# Patient Record
Sex: FEMALE | Race: WHITE | NOT HISPANIC OR LATINO | Employment: OTHER | URBAN - METROPOLITAN AREA
[De-identification: names, ages, dates, MRNs, and addresses within clinical notes are randomized per-mention and may not be internally consistent; named-entity substitution may affect disease eponyms.]

---

## 2017-10-15 ENCOUNTER — HOSPITAL ENCOUNTER (EMERGENCY)
Facility: HOSPITAL | Age: 52
Discharge: HOME | End: 2017-10-15
Attending: EMERGENCY MEDICINE
Payer: MEDICAID

## 2017-10-15 VITALS
HEART RATE: 84 BPM | TEMPERATURE: 99 F | HEIGHT: 67 IN | DIASTOLIC BLOOD PRESSURE: 110 MMHG | SYSTOLIC BLOOD PRESSURE: 178 MMHG | BODY MASS INDEX: 34.53 KG/M2 | RESPIRATION RATE: 16 BRPM | WEIGHT: 220 LBS | OXYGEN SATURATION: 95 %

## 2017-10-15 DIAGNOSIS — R10.30 LOWER ABDOMINAL PAIN: ICD-10-CM

## 2017-10-15 DIAGNOSIS — R19.7 VOMITING AND DIARRHEA: Primary | ICD-10-CM

## 2017-10-15 DIAGNOSIS — R11.10 VOMITING AND DIARRHEA: Primary | ICD-10-CM

## 2017-10-15 LAB
ANION GAP SERPL CALC-SCNC: 12 MMOL/L
B-HCG UR QL: NEGATIVE
BASOPHILS # BLD AUTO: 0.1 K/UL
BASOPHILS NFR BLD: 0.9 %
BUN SERPL-MCNC: 19 MG/DL
CALCIUM SERPL-MCNC: 9.2 MG/DL
CHLORIDE SERPL-SCNC: 105 MMOL/L
CO2 SERPL-SCNC: 24 MMOL/L
CREAT SERPL-MCNC: 0.7 MG/DL
CTP QC/QA: YES
DIFFERENTIAL METHOD: ABNORMAL
EOSINOPHIL # BLD AUTO: 0 K/UL
EOSINOPHIL NFR BLD: 0.2 %
ERYTHROCYTE [DISTWIDTH] IN BLOOD BY AUTOMATED COUNT: 12.9 %
EST. GFR  (AFRICAN AMERICAN): >60 ML/MIN/1.73 M^2
EST. GFR  (NON AFRICAN AMERICAN): >60 ML/MIN/1.73 M^2
GLUCOSE SERPL-MCNC: 109 MG/DL
HCT VFR BLD AUTO: 45.7 %
HGB BLD-MCNC: 15.5 G/DL
LYMPHOCYTES # BLD AUTO: 2.3 K/UL
LYMPHOCYTES NFR BLD: 20.1 %
MCH RBC QN AUTO: 31.8 PG
MCHC RBC AUTO-ENTMCNC: 34 G/DL
MCV RBC AUTO: 93 FL
MONOCYTES # BLD AUTO: 0.5 K/UL
MONOCYTES NFR BLD: 4.4 %
NEUTROPHILS # BLD AUTO: 8.4 K/UL
NEUTROPHILS NFR BLD: 74.4 %
PLATELET # BLD AUTO: 221 K/UL
PMV BLD AUTO: 8.3 FL
POTASSIUM SERPL-SCNC: 3.9 MMOL/L
RBC # BLD AUTO: 4.89 M/UL
SODIUM SERPL-SCNC: 141 MMOL/L
WBC # BLD AUTO: 11.3 K/UL

## 2017-10-15 PROCEDURE — 96375 TX/PRO/DX INJ NEW DRUG ADDON: CPT

## 2017-10-15 PROCEDURE — 25000003 PHARM REV CODE 250: Performed by: EMERGENCY MEDICINE

## 2017-10-15 PROCEDURE — 63600175 PHARM REV CODE 636 W HCPCS: Performed by: EMERGENCY MEDICINE

## 2017-10-15 PROCEDURE — 81025 URINE PREGNANCY TEST: CPT | Performed by: EMERGENCY MEDICINE

## 2017-10-15 PROCEDURE — 85025 COMPLETE CBC W/AUTO DIFF WBC: CPT

## 2017-10-15 PROCEDURE — 99284 EMERGENCY DEPT VISIT MOD MDM: CPT | Mod: 25

## 2017-10-15 PROCEDURE — 80048 BASIC METABOLIC PNL TOTAL CA: CPT

## 2017-10-15 PROCEDURE — 36415 COLL VENOUS BLD VENIPUNCTURE: CPT

## 2017-10-15 PROCEDURE — 96365 THER/PROPH/DIAG IV INF INIT: CPT

## 2017-10-15 RX ORDER — KETOROLAC TROMETHAMINE 30 MG/ML
10 INJECTION, SOLUTION INTRAMUSCULAR; INTRAVENOUS
Status: COMPLETED | OUTPATIENT
Start: 2017-10-15 | End: 2017-10-15

## 2017-10-15 RX ORDER — GUAIFENESIN/DEXTROMETHORPHAN 100-10MG/5
5 SYRUP ORAL 4 TIMES DAILY PRN
Qty: 120 ML | Refills: 0 | Status: SHIPPED | OUTPATIENT
Start: 2017-10-15 | End: 2017-10-25

## 2017-10-15 RX ORDER — PROMETHAZINE HYDROCHLORIDE 25 MG/1
25 TABLET ORAL EVERY 6 HOURS PRN
Qty: 12 TABLET | Refills: 0 | Status: SHIPPED | OUTPATIENT
Start: 2017-10-15 | End: 2023-05-16

## 2017-10-15 RX ORDER — LOPERAMIDE HYDROCHLORIDE 2 MG/1
2 CAPSULE ORAL 4 TIMES DAILY PRN
Qty: 12 CAPSULE | Refills: 0 | Status: SHIPPED | OUTPATIENT
Start: 2017-10-15 | End: 2017-10-25

## 2017-10-15 RX ADMIN — KETOROLAC TROMETHAMINE 10 MG: 30 INJECTION, SOLUTION INTRAMUSCULAR at 07:10

## 2017-10-15 RX ADMIN — SODIUM CHLORIDE 1000 ML: 0.9 INJECTION, SOLUTION INTRAVENOUS at 07:10

## 2017-10-15 RX ADMIN — PROMETHAZINE HYDROCHLORIDE 25 MG: 25 INJECTION INTRAMUSCULAR; INTRAVENOUS at 07:10

## 2017-10-15 NOTE — ED PROVIDER NOTES
Encounter Date: 10/15/2017    SCRIBE #1 NOTE: I, Lyndsey Marr, am scribing for, and in the presence of, Dr. Danielle .       History     Chief Complaint   Patient presents with    Emesis     symptoms x3 days    Diarrhea    Generalized Body Aches     with fever       10/15/2017 6:38 PM     Chief complaint: Emesis      Dinora Anderson is a 51 y.o. female with Bipolar Disorder, Schizoaffective disorder, and polysubstance abuse who presents to the ED with complaints of emesis, diarrhea, general body aches and fever x 3 days. She notes a 102 fever yesterday. Pt notes abd pain secondary to emesis. She reports several episodes of emesis and diarrhea, more than 6. She has taken Tylenol with no relief of sx. Pt denies blood in stool and sick contact. Allergens include Codeine and Penicillin.       The history is provided by the patient.     Review of patient's allergies indicates:   Allergen Reactions    Codeine      Rash and itch    Pcn [penicillins]      swelling     Past Medical History:   Diagnosis Date    Bipolar 1 disorder     Bipolar 1 disorder     bipolar c schizophrenia and ptsd    Polysubstance abuse     PTSD (post-traumatic stress disorder)     Schizoaffective disorder      Past Surgical History:   Procedure Laterality Date    TUBAL LIGATION       History reviewed. No pertinent family history.  Social History   Substance Use Topics    Smoking status: Former Smoker    Smokeless tobacco: Former User     Quit date: 8/16/2013    Alcohol use No     Review of Systems   Constitutional: Positive for fever.   HENT: Negative for sore throat.    Eyes: Negative for redness.   Respiratory: Negative for shortness of breath.    Cardiovascular: Negative for chest pain.   Gastrointestinal: Positive for diarrhea, nausea and vomiting.   Genitourinary: Negative for dysuria.   Musculoskeletal: Positive for myalgias. Negative for back pain.   Skin: Negative for rash.   Neurological: Negative for weakness.    Hematological: Does not bruise/bleed easily.       Physical Exam     Initial Vitals [10/15/17 1833]   BP Pulse Resp Temp SpO2   (!) 178/110 84 16 98.7 °F (37.1 °C) 95 %      MAP       132.67         Physical Exam    Nursing note and vitals reviewed.  Constitutional: She appears well-developed and well-nourished. She is not diaphoretic. No distress.   HENT:   Head: Normocephalic and atraumatic.   Mouth/Throat: Oropharynx is clear and moist and mucous membranes are normal.   Eyes: Conjunctivae and EOM are normal. Pupils are equal, round, and reactive to light.   Neck: Normal range of motion. Neck supple.   Cardiovascular: Normal rate, regular rhythm, normal heart sounds and intact distal pulses. Exam reveals no gallop and no friction rub.    No murmur heard.  Pulmonary/Chest: Breath sounds normal. She has no wheezes. She has no rhonchi. She has no rales.   Abdominal: Soft. She exhibits no distension. There is no tenderness.   Musculoskeletal: Normal range of motion.   No peripheral edema.    Neurological: She is alert and oriented to person, place, and time.   Skin: No rash noted. No erythema.   Psychiatric: She has a normal mood and affect. Her speech is normal and behavior is normal. Judgment and thought content normal.         ED Course   Procedures  Labs Reviewed   CBC W/ AUTO DIFFERENTIAL - Abnormal; Notable for the following:        Result Value    MCH 31.8 (*)     MPV 8.3 (*)     Gran # 8.4 (*)     Gran% 74.4 (*)     All other components within normal limits   BASIC METABOLIC PANEL   POCT URINE PREGNANCY                        Scribe Attestation:   Scribe #1: I performed the above scribed service and the documentation accurately describes the services I performed. I attest to the accuracy of the note.    I, Dr. George Danielle, personally performed the services described in this documentation. All medical record entries made by the scribe were at my direction and in my presence.  I have reviewed the chart  and agree that the record reflects my personal performance and is accurate and complete. George Danielle MD.  10:28 PM 10/15/2017    Dinora Anderson is a 51 y.o. female presenting with multiple complaints consisting of vomiting, diarrhea, and abdominal pain.  No sniffed and reproducible abdominal pain on exam with likely pain secondary to repeated vomiting.  Patient does not appear severely dehydrated.  IV fluids for symptomatic treatment along with antiemetic given here with significant symptomatic improvement.  No further emesis in ED.  Laboratories reviewed with no sign of significant renal insult or dehydration.  For low suspicion for bacterial etiology.  I do not think further antibiotics are indicated.  If infectious viral etiology much more likely.  He is appropriate for outpatient therapy with oral rehydration reviewed and antiemetic as needed prescribed.  Probiotic daily also prescribed pending outpatient PCP follow-up.  Detailed return precautions reviewed.  Very low suspicion for other emergent abdominal processes such as appendicitis, cholecystitis, abscess.  I do not think further abdominal imaging is indicated.        ED Course      Clinical Impression:   The primary encounter diagnosis was Vomiting and diarrhea. A diagnosis of Lower abdominal pain was also pertinent to this visit.                           George Danielle MD  10/15/17 3381

## 2017-11-16 ENCOUNTER — HOSPITAL ENCOUNTER (EMERGENCY)
Facility: HOSPITAL | Age: 52
Discharge: HOME | End: 2017-11-16
Attending: EMERGENCY MEDICINE
Payer: MEDICAID

## 2017-11-16 VITALS
DIASTOLIC BLOOD PRESSURE: 61 MMHG | OXYGEN SATURATION: 93 % | HEART RATE: 79 BPM | SYSTOLIC BLOOD PRESSURE: 118 MMHG | HEIGHT: 67 IN | BODY MASS INDEX: 37.67 KG/M2 | TEMPERATURE: 99 F | WEIGHT: 240 LBS | RESPIRATION RATE: 22 BRPM

## 2017-11-16 DIAGNOSIS — K27.9 PEPTIC ULCER DISEASE: Primary | ICD-10-CM

## 2017-11-16 DIAGNOSIS — R10.9 ABDOMINAL PAIN, UNSPECIFIED ABDOMINAL LOCATION: ICD-10-CM

## 2017-11-16 LAB
ALBUMIN SERPL BCP-MCNC: 3.5 G/DL
ALP SERPL-CCNC: 106 U/L
ALT SERPL W/O P-5'-P-CCNC: 22 U/L
ANION GAP SERPL CALC-SCNC: 10 MMOL/L
AST SERPL-CCNC: 23 U/L
BASOPHILS # BLD AUTO: 0 K/UL
BASOPHILS NFR BLD: 0.3 %
BILIRUB SERPL-MCNC: 0.3 MG/DL
BUN SERPL-MCNC: 20 MG/DL
CALCIUM SERPL-MCNC: 9.7 MG/DL
CHLORIDE SERPL-SCNC: 102 MMOL/L
CO2 SERPL-SCNC: 26 MMOL/L
CREAT SERPL-MCNC: 1.2 MG/DL
DIFFERENTIAL METHOD: ABNORMAL
EOSINOPHIL # BLD AUTO: 0 K/UL
EOSINOPHIL NFR BLD: 0.4 %
ERYTHROCYTE [DISTWIDTH] IN BLOOD BY AUTOMATED COUNT: 12.8 %
EST. GFR  (AFRICAN AMERICAN): >60 ML/MIN/1.73 M^2
EST. GFR  (NON AFRICAN AMERICAN): 52 ML/MIN/1.73 M^2
GLUCOSE SERPL-MCNC: 123 MG/DL
HCT VFR BLD AUTO: 44.5 %
HGB BLD-MCNC: 15.1 G/DL
LIPASE SERPL-CCNC: 37 U/L
LYMPHOCYTES # BLD AUTO: 2.1 K/UL
LYMPHOCYTES NFR BLD: 20 %
MCH RBC QN AUTO: 31.9 PG
MCHC RBC AUTO-ENTMCNC: 34 G/DL
MCV RBC AUTO: 94 FL
MONOCYTES # BLD AUTO: 0.7 K/UL
MONOCYTES NFR BLD: 7 %
NEUTROPHILS # BLD AUTO: 7.6 K/UL
NEUTROPHILS NFR BLD: 72.3 %
PLATELET # BLD AUTO: 212 K/UL
PMV BLD AUTO: 8.4 FL
POTASSIUM SERPL-SCNC: 3.9 MMOL/L
PROT SERPL-MCNC: 7.3 G/DL
RBC # BLD AUTO: 4.74 M/UL
SODIUM SERPL-SCNC: 138 MMOL/L
WBC # BLD AUTO: 10.5 K/UL

## 2017-11-16 PROCEDURE — 96375 TX/PRO/DX INJ NEW DRUG ADDON: CPT

## 2017-11-16 PROCEDURE — 83690 ASSAY OF LIPASE: CPT

## 2017-11-16 PROCEDURE — 96374 THER/PROPH/DIAG INJ IV PUSH: CPT

## 2017-11-16 PROCEDURE — 80053 COMPREHEN METABOLIC PANEL: CPT

## 2017-11-16 PROCEDURE — 96361 HYDRATE IV INFUSION ADD-ON: CPT

## 2017-11-16 PROCEDURE — C9113 INJ PANTOPRAZOLE SODIUM, VIA: HCPCS | Performed by: NURSE PRACTITIONER

## 2017-11-16 PROCEDURE — 25000003 PHARM REV CODE 250: Performed by: NURSE PRACTITIONER

## 2017-11-16 PROCEDURE — 36415 COLL VENOUS BLD VENIPUNCTURE: CPT

## 2017-11-16 PROCEDURE — 99284 EMERGENCY DEPT VISIT MOD MDM: CPT | Mod: 25

## 2017-11-16 PROCEDURE — 85025 COMPLETE CBC W/AUTO DIFF WBC: CPT

## 2017-11-16 PROCEDURE — 25500020 PHARM REV CODE 255

## 2017-11-16 PROCEDURE — 63600175 PHARM REV CODE 636 W HCPCS: Performed by: NURSE PRACTITIONER

## 2017-11-16 RX ORDER — DIPHENHYDRAMINE HYDROCHLORIDE 50 MG/ML
25 INJECTION INTRAMUSCULAR; INTRAVENOUS
Status: COMPLETED | OUTPATIENT
Start: 2017-11-16 | End: 2017-11-16

## 2017-11-16 RX ORDER — ONDANSETRON 2 MG/ML
4 INJECTION INTRAMUSCULAR; INTRAVENOUS
Status: COMPLETED | OUTPATIENT
Start: 2017-11-16 | End: 2017-11-16

## 2017-11-16 RX ORDER — HYDROMORPHONE HYDROCHLORIDE 1 MG/ML
0.5 INJECTION, SOLUTION INTRAMUSCULAR; INTRAVENOUS; SUBCUTANEOUS
Status: COMPLETED | OUTPATIENT
Start: 2017-11-16 | End: 2017-11-16

## 2017-11-16 RX ORDER — KETOROLAC TROMETHAMINE 30 MG/ML
15 INJECTION, SOLUTION INTRAMUSCULAR; INTRAVENOUS
Status: COMPLETED | OUTPATIENT
Start: 2017-11-16 | End: 2017-11-16

## 2017-11-16 RX ORDER — PANTOPRAZOLE SODIUM 40 MG/10ML
40 INJECTION, POWDER, LYOPHILIZED, FOR SOLUTION INTRAVENOUS
Status: COMPLETED | OUTPATIENT
Start: 2017-11-16 | End: 2017-11-16

## 2017-11-16 RX ORDER — PROCHLORPERAZINE EDISYLATE 5 MG/ML
5 INJECTION INTRAMUSCULAR; INTRAVENOUS
Status: COMPLETED | OUTPATIENT
Start: 2017-11-16 | End: 2017-11-16

## 2017-11-16 RX ORDER — MORPHINE SULFATE 4 MG/ML
8 INJECTION, SOLUTION INTRAMUSCULAR; INTRAVENOUS
Status: DISCONTINUED | OUTPATIENT
Start: 2017-11-16 | End: 2017-11-16

## 2017-11-16 RX ORDER — OMEPRAZOLE 40 MG/1
40 CAPSULE, DELAYED RELEASE ORAL DAILY
Qty: 30 CAPSULE | Refills: 1 | Status: SHIPPED | OUTPATIENT
Start: 2017-11-16 | End: 2018-11-16

## 2017-11-16 RX ADMIN — ONDANSETRON 4 MG: 2 INJECTION INTRAMUSCULAR; INTRAVENOUS at 07:11

## 2017-11-16 RX ADMIN — LIDOCAINE HYDROCHLORIDE: 20 SOLUTION ORAL; TOPICAL at 09:11

## 2017-11-16 RX ADMIN — PROCHLORPERAZINE EDISYLATE 5 MG: 5 INJECTION INTRAMUSCULAR; INTRAVENOUS at 09:11

## 2017-11-16 RX ADMIN — SODIUM CHLORIDE 1000 ML: 0.9 INJECTION, SOLUTION INTRAVENOUS at 07:11

## 2017-11-16 RX ADMIN — HYDROMORPHONE HYDROCHLORIDE 0.5 MG: 1 INJECTION, SOLUTION INTRAMUSCULAR; INTRAVENOUS; SUBCUTANEOUS at 08:11

## 2017-11-16 RX ADMIN — IOHEXOL 100 ML: 350 INJECTION, SOLUTION INTRAVENOUS at 08:11

## 2017-11-16 RX ADMIN — DIPHENHYDRAMINE HYDROCHLORIDE 25 MG: 50 INJECTION, SOLUTION INTRAMUSCULAR; INTRAVENOUS at 09:11

## 2017-11-16 RX ADMIN — PANTOPRAZOLE SODIUM 40 MG: 40 INJECTION, POWDER, FOR SOLUTION INTRAVENOUS at 10:11

## 2017-11-16 RX ADMIN — KETOROLAC TROMETHAMINE 15 MG: 30 INJECTION, SOLUTION INTRAMUSCULAR at 07:11

## 2017-11-17 NOTE — ED NOTES
Patient identifiers for Dionra Anderson checked and correct.  LOC:  Patient is awake, alert, and aware of environment with an appropriate affect. Patient is oriented x 3 and speaking appropriately.  APPEARANCE:  Patient resting comfortably and in no acute distress. Patient is clean and well groomed, patient's clothing is properly fastened.  SKIN:  The skin is warm and dry. Patient has normal skin turgor and moist mucus membranes. Skin is intact; no bruising or breakdown noted.  MUSCULOSKELETAL:  Patient is moving all extremities well, no obvious deformities noted. Pulses intact.   RESPIRATORY:  Airway is open and patent. Respirations are spontaneous and non-labored with normal effort and rate.  CARDIAC:  Patient has a normal rate and rhythm. No peripheral edema noted. Capillary refill < 3 seconds.  ABDOMEN:  No distention noted.  Tender to nelson upper and mid. Pt reports nausea and pain worsening after eating x 3 weeks.   NEUROLOGICAL:  PERRL. Facial expression is symmetrical. Hand grasps are equal bilaterally. Normal sensation in all extremities when touched with finger.  Allergies reported:    Review of patient's allergies indicates:   Allergen Reactions    Codeine      Rash and itch    Pcn [penicillins]      swelling     OTHER NOTES:

## 2017-11-17 NOTE — ED PROVIDER NOTES
"Encounter Date: 11/16/2017    SCRIBE #1 NOTE: I, Lyndsey Marr, am scribing for, and in the presence of, Jeancarlos Moore NP.       History     Chief Complaint   Patient presents with    Abdominal Pain     started 3 weeks ago, reports unable to hold anything down        11/16/2017 6:46 PM     Chief complaint: Abd pain      Dinora Anderson is a 51 y.o. female with a hx of Hepatitis C, Bipolar disorder, Schizoaffective disorder, and PTSD and Substance abuse who presents to the ED with complaints of intermittent mid epigastric abd pain and bloating associated with nausea, vomiting, and diarrhea x 3 weeks. The sx are exacerbated with PO intake. She states she "can't keep anything down." Pt was seen by PCP last week and was given Phenergan. The patient states that her PCP was to set up endoscopy and colonoscopy but has not yet scheduled the procedure. Allergies include Codeine and Penicillins. She denies pain radiation, blood in stool and hematemesis, urinary symptoms.       The history is provided by the patient.     Review of patient's allergies indicates:   Allergen Reactions    Codeine      Rash and itch    Pcn [penicillins]      swelling     Past Medical History:   Diagnosis Date    Bipolar 1 disorder     Bipolar 1 disorder     bipolar c schizophrenia and ptsd    Polysubstance abuse     PTSD (post-traumatic stress disorder)     Schizoaffective disorder      Past Surgical History:   Procedure Laterality Date    TUBAL LIGATION       No family history on file.  Social History   Substance Use Topics    Smoking status: Former Smoker    Smokeless tobacco: Former User     Quit date: 8/16/2013    Alcohol use No     Review of Systems   Constitutional: Negative for chills and fever.   HENT: Negative for congestion, rhinorrhea and sore throat.    Eyes: Negative for pain and redness.   Respiratory: Negative for cough and shortness of breath.    Cardiovascular: Negative for chest pain and palpitations. "   Gastrointestinal: Positive for abdominal pain, diarrhea, nausea and vomiting.   Genitourinary: Negative for dysuria, flank pain, frequency, hematuria and urgency.   Musculoskeletal: Negative for gait problem, myalgias and neck pain.   Skin: Negative for rash.   Neurological: Negative for dizziness, light-headedness and headaches.       Physical Exam     Initial Vitals [11/16/17 1825]   BP Pulse Resp Temp SpO2   117/76 106 (!) 22 99.2 °F (37.3 °C) 95 %      MAP       89.67         Physical Exam    Nursing note and vitals reviewed.  Constitutional: Vital signs are normal. She appears well-developed and well-nourished. She is not diaphoretic. She is active. She does not have a sickly appearance. No distress.   HENT:   Head: Normocephalic and atraumatic.   Eyes: Conjunctivae are normal. Pupils are equal, round, and reactive to light.   Neck: Normal range of motion and full passive range of motion without pain.   Cardiovascular: Normal rate, regular rhythm and normal heart sounds. Exam reveals no gallop and no friction rub.    No murmur heard.  Pulmonary/Chest: Breath sounds normal. She has no wheezes. She has no rhonchi. She has no rales.   Abdominal: Soft. Bowel sounds are normal. There is tenderness (diffuse, > in epigastric and RUQ).   Musculoskeletal: Normal range of motion.   Neurological: She is alert. Gait normal.   Skin: Skin is warm and dry. Capillary refill takes less than 2 seconds.   Psychiatric: She has a normal mood and affect.         ED Course   Procedures  Labs Reviewed - No data to display                APC / Resident Notes:   Dinora Anderson is a 51 year old female presenting to the ED with 3 weeks of epigastric and RUQ abdominal pain, nausea, vomiting, and diarrhea with oral intake. The patient appears well hydrated and nontoxic.  No emergent findings noted on CT.  Her pain was resolved after GI cocktail and nausea/vomiting resolved after compazine. I do not suspect SBO, viscus perforation,  pancreatitis, or other emergent cause of her abdominal pain. Her symptoms may be due to a peptic ulcer. I prescribed a PPI and instructed the patient on use of maalox. I advised her that she must contact GI as previously discussed by her PCP to set up follow up for endoscopy/colonoscopy. I discussed specific return precautions and she verbalized understanding. Based on my clinical evaluation, I do not appreciate any immediate, emergent, or life threatening condition or etiology that warrants additional workup today and feel that the patient can be discharged with close follow up care.          Scribe Attestation:   Scribe #1: I performed the above scribed service and the documentation accurately describes the services I performed. I attest to the accuracy of the note.    I, LASHAY Dueñas, personally performed the services described in this documentation. All medical record entries made by the scribe were at my direction and in my presence.  I have reviewed the chart and agree that the record reflects my personal performance and is accurate and complete. LASHAY Dueñas.  1:58 AM 11/17/2017          ED Course      Clinical Impression:     1. Peptic ulcer disease    2. Abdominal pain, unspecified abdominal location        Disposition:   Disposition: Discharged  Condition: Stable                        Denae Moore NP  11/17/17 0204

## 2023-05-16 ENCOUNTER — OFFICE VISIT (OUTPATIENT)
Dept: FAMILY MEDICINE | Facility: CLINIC | Age: 58
End: 2023-05-16
Payer: MEDICAID

## 2023-05-16 VITALS
TEMPERATURE: 98 F | RESPIRATION RATE: 18 BRPM | OXYGEN SATURATION: 98 % | HEART RATE: 89 BPM | DIASTOLIC BLOOD PRESSURE: 94 MMHG | WEIGHT: 257.19 LBS | BODY MASS INDEX: 40.37 KG/M2 | HEIGHT: 67 IN | SYSTOLIC BLOOD PRESSURE: 146 MMHG

## 2023-05-16 DIAGNOSIS — Z11.4 SCREENING FOR HIV (HUMAN IMMUNODEFICIENCY VIRUS): ICD-10-CM

## 2023-05-16 DIAGNOSIS — I10 ESSENTIAL HYPERTENSION: ICD-10-CM

## 2023-05-16 DIAGNOSIS — Z12.31 ENCOUNTER FOR SCREENING MAMMOGRAM FOR MALIGNANT NEOPLASM OF BREAST: ICD-10-CM

## 2023-05-16 DIAGNOSIS — M54.16 LUMBAR RADICULOPATHY, CHRONIC: ICD-10-CM

## 2023-05-16 DIAGNOSIS — Z76.89 ENCOUNTER TO ESTABLISH CARE: Primary | ICD-10-CM

## 2023-05-16 DIAGNOSIS — F20.9 SCHIZOPHRENIA, UNSPECIFIED TYPE: ICD-10-CM

## 2023-05-16 DIAGNOSIS — F31.32 BIPOLAR 1 DISORDER, DEPRESSED, MODERATE: ICD-10-CM

## 2023-05-16 DIAGNOSIS — Z11.59 ENCOUNTER FOR HEPATITIS C SCREENING TEST FOR LOW RISK PATIENT: ICD-10-CM

## 2023-05-16 PROCEDURE — 99204 PR OFFICE/OUTPT VISIT, NEW, LEVL IV, 45-59 MIN: ICD-10-PCS | Mod: S$PBB,,, | Performed by: FAMILY MEDICINE

## 2023-05-16 PROCEDURE — 3008F BODY MASS INDEX DOCD: CPT | Mod: CPTII,,, | Performed by: FAMILY MEDICINE

## 2023-05-16 PROCEDURE — 1160F RVW MEDS BY RX/DR IN RCRD: CPT | Mod: CPTII,,, | Performed by: FAMILY MEDICINE

## 2023-05-16 PROCEDURE — 3077F PR MOST RECENT SYSTOLIC BLOOD PRESSURE >= 140 MM HG: ICD-10-PCS | Mod: CPTII,,, | Performed by: FAMILY MEDICINE

## 2023-05-16 PROCEDURE — 99999 PR PBB SHADOW E&M-NEW PATIENT-LVL III: ICD-10-PCS | Mod: PBBFAC,,, | Performed by: FAMILY MEDICINE

## 2023-05-16 PROCEDURE — 1159F PR MEDICATION LIST DOCUMENTED IN MEDICAL RECORD: ICD-10-PCS | Mod: CPTII,,, | Performed by: FAMILY MEDICINE

## 2023-05-16 PROCEDURE — 99204 OFFICE O/P NEW MOD 45 MIN: CPT | Mod: S$PBB,,, | Performed by: FAMILY MEDICINE

## 2023-05-16 PROCEDURE — 3080F PR MOST RECENT DIASTOLIC BLOOD PRESSURE >= 90 MM HG: ICD-10-PCS | Mod: CPTII,,, | Performed by: FAMILY MEDICINE

## 2023-05-16 PROCEDURE — 4010F ACE/ARB THERAPY RXD/TAKEN: CPT | Mod: CPTII,,, | Performed by: FAMILY MEDICINE

## 2023-05-16 PROCEDURE — 99999 PR PBB SHADOW E&M-NEW PATIENT-LVL III: CPT | Mod: PBBFAC,,, | Performed by: FAMILY MEDICINE

## 2023-05-16 PROCEDURE — 4010F PR ACE/ARB THEARPY RXD/TAKEN: ICD-10-PCS | Mod: CPTII,,, | Performed by: FAMILY MEDICINE

## 2023-05-16 PROCEDURE — 3080F DIAST BP >= 90 MM HG: CPT | Mod: CPTII,,, | Performed by: FAMILY MEDICINE

## 2023-05-16 PROCEDURE — 99203 OFFICE O/P NEW LOW 30 MIN: CPT | Mod: PBBFAC | Performed by: FAMILY MEDICINE

## 2023-05-16 PROCEDURE — 3077F SYST BP >= 140 MM HG: CPT | Mod: CPTII,,, | Performed by: FAMILY MEDICINE

## 2023-05-16 PROCEDURE — 1160F PR REVIEW ALL MEDS BY PRESCRIBER/CLIN PHARMACIST DOCUMENTED: ICD-10-PCS | Mod: CPTII,,, | Performed by: FAMILY MEDICINE

## 2023-05-16 PROCEDURE — 1159F MED LIST DOCD IN RCRD: CPT | Mod: CPTII,,, | Performed by: FAMILY MEDICINE

## 2023-05-16 PROCEDURE — 3008F PR BODY MASS INDEX (BMI) DOCUMENTED: ICD-10-PCS | Mod: CPTII,,, | Performed by: FAMILY MEDICINE

## 2023-05-16 RX ORDER — LOSARTAN POTASSIUM 25 MG/1
25 TABLET ORAL DAILY
Qty: 90 TABLET | Refills: 3 | Status: SHIPPED | OUTPATIENT
Start: 2023-05-16 | End: 2024-05-15

## 2023-05-16 RX ORDER — QUETIAPINE FUMARATE 100 MG/1
100 TABLET, FILM COATED ORAL DAILY
Qty: 90 TABLET | Refills: 3 | Status: SHIPPED | OUTPATIENT
Start: 2023-05-16

## 2023-05-16 RX ORDER — TIZANIDINE 4 MG/1
4 TABLET ORAL EVERY 8 HOURS PRN
Qty: 60 TABLET | Refills: 2 | Status: SHIPPED | OUTPATIENT
Start: 2023-05-16 | End: 2023-06-30

## 2023-05-16 NOTE — PROGRESS NOTES
[facility] - Clinic Note    Subjective      Ms. Anderson is a 57 y.o. female who presents to clinic for Establish Care and Back Pain    Patient has a history of bipolar disorder, depression, PTSD, schizophrenia.  She is been off of her Seroquel for some time because she has not seen the psychiatrist.  She also has chronic pain in her lower back.  This has been going on and off for the last 4 years.  Currently on disability.  Has been seeing the chiropractor but has continued pain in her lower back, hips, down her legs.  Also has some urinary incontinence.    PM Dinora has a past medical history of Bipolar 1 disorder, Bipolar 1 disorder, Depression, Polysubstance abuse, PTSD (post-traumatic stress disorder), Schizoaffective disorder, and Schizophrenia.   PSXH Dinora has a past surgical history that includes Tubal ligation.    Dinora's family history is not on file.    Dinora reports that she has quit smoking. She quit smokeless tobacco use about 9 years ago. She reports that she does not drink alcohol and does not use drugs.   ALG Dinora is allergic to codeine and pcn [penicillins].   MED Dinora has a current medication list which includes the following prescription(s): citalopram, losartan, omeprazole, quetiapine, and tizanidine.     Review of Systems   Constitutional:  Negative for chills and fever.   HENT:  Negative for congestion and rhinorrhea.    Eyes:  Negative for visual disturbance.   Respiratory:  Negative for cough and shortness of breath.    Cardiovascular:  Negative for chest pain.   Gastrointestinal:  Negative for abdominal pain, constipation, diarrhea, nausea and vomiting.   Genitourinary:  Negative for dysuria.   Musculoskeletal:  Positive for back pain. Negative for myalgias.   Skin:  Negative for rash.   Neurological:  Negative for weakness and headaches.   Objective     BP (!) 146/94 (BP Location: Left arm, Patient Position: Sitting, BP Method: X-Large (Manual))   Pulse 89   Temp 98.2 °F  "(36.8 °C) (Temporal)   Resp 18   Ht 5' 7" (1.702 m)   Wt 116.7 kg (257 lb 3.2 oz)   SpO2 98%   BMI 40.28 kg/m²     Physical Exam  Vitals and nursing note reviewed.   Constitutional:       General: She is not in acute distress.     Appearance: Normal appearance. She is well-developed. She is not diaphoretic.   HENT:      Head: Normocephalic and atraumatic.      Right Ear: External ear normal.      Left Ear: External ear normal.   Eyes:      General:         Right eye: No discharge.         Left eye: No discharge.   Cardiovascular:      Rate and Rhythm: Normal rate and regular rhythm.      Heart sounds: Normal heart sounds.   Pulmonary:      Effort: Pulmonary effort is normal.      Breath sounds: Normal breath sounds. No wheezing or rales.   Skin:     General: Skin is warm and dry.   Neurological:      Mental Status: She is alert and oriented to person, place, and time. Mental status is at baseline.   Psychiatric:         Mood and Affect: Mood normal.         Behavior: Behavior normal.         Thought Content: Thought content normal.         Judgment: Judgment normal.      Assessment/Plan     1. Encounter to establish care        2. Lumbar radiculopathy, chronic  MRI Lumbar Spine Without Contrast    tiZANidine (ZANAFLEX) 4 MG tablet      3. Bipolar 1 disorder, depressed, moderate  Comprehensive Metabolic Panel    CBC Auto Differential    TSH    QUEtiapine (SEROQUEL) 100 MG Tab      4. Encounter for screening mammogram for malignant neoplasm of breast  Mammo Digital Screening Bilat w/ Peter      5. Essential hypertension  Lipid Panel    Hemoglobin A1C    losartan (COZAAR) 25 MG tablet      6. Encounter for hepatitis C screening test for low risk patient  Hepatitis C Antibody      7. Screening for HIV (human immunodeficiency virus)  HIV 1/2 Ag/Ab (4th Gen)      8. Schizophrenia, unspecified type          Given persistent symptoms despite conservative management will order MRI to evaluate lumbar radiculopathy.  " Zanaflex as needed for back pain.  Start losartan for elevated blood pressure.  Restart Seroquel.  Check labs as above.  Ordered mammogram.  Follow-up in 1 month for re-evaluation.    Future Appointments   Date Time Provider Department Center   5/17/2023  9:40 AM [facility], LAB[facility]   5/26/2023  1:00 PM [facility] MRI1 350 LB LIMIT [facility] MRI [facility]   6/9/2023  5:00 PM [facility] MAMMO1 [facility] MAMMO [facility]   7/17/2023  3:40 PM Sanjay Saleh MD [facility]         Sanjay Saleh MD  Family Medicine  [facility]

## 2023-05-25 ENCOUNTER — PATIENT MESSAGE (OUTPATIENT)
Dept: ADMINISTRATIVE | Facility: HOSPITAL | Age: 58
End: 2023-05-25
Payer: MEDICAID

## 2023-05-30 ENCOUNTER — HOSPITAL ENCOUNTER (OUTPATIENT)
Dept: RADIOLOGY | Facility: HOSPITAL | Age: 58
Discharge: HOME | End: 2023-05-30
Attending: FAMILY MEDICINE
Payer: MEDICAID

## 2023-05-30 DIAGNOSIS — M54.16 LUMBAR RADICULOPATHY, CHRONIC: ICD-10-CM

## 2023-05-30 PROCEDURE — 72148 MRI LUMBAR SPINE WITHOUT CONTRAST: ICD-10-PCS | Mod: 26,,, | Performed by: RADIOLOGY

## 2023-05-30 PROCEDURE — 72148 MRI LUMBAR SPINE W/O DYE: CPT | Mod: TC

## 2023-05-30 PROCEDURE — 72148 MRI LUMBAR SPINE W/O DYE: CPT | Mod: 26,,, | Performed by: RADIOLOGY

## 2023-06-01 ENCOUNTER — TELEPHONE (OUTPATIENT)
Dept: FAMILY MEDICINE | Facility: CLINIC | Age: 58
End: 2023-06-01
Payer: MEDICAID

## 2023-06-01 NOTE — TELEPHONE ENCOUNTER
States the muscle relaxer is not working - states she is having a hard time moving period - states really bad last couple of days - states right butt muscle will not relax - also states [employer] has sent a couple of faxes requesting a prescription be sent to them for adult diapers

## 2023-06-01 NOTE — TELEPHONE ENCOUNTER
----- Message from Zuly Eugene sent at 6/1/2023 10:26 AM CDT -----  Contact: Patient  Type:  Needs Medical Advice    Who Called:   Patient    Would the patient rather a call back or a response via [employer]?  Call back  Best Call Back Number:  [phone]    Additional Information: States the muscle relaxer is not working - states she is having a hard time moving period - states really bad last couple of days - states right butt muscle will not relax - also states [facility] has sent a couple of faxes requesting a prescription be sent to them for adult diapers - please call to advise/discuss these issues - thank you

## 2023-06-02 ENCOUNTER — TELEPHONE (OUTPATIENT)
Dept: FAMILY MEDICINE | Facility: CLINIC | Age: 58
End: 2023-06-02
Payer: MEDICAID

## 2023-06-02 NOTE — TELEPHONE ENCOUNTER
----- Message from Gypsy Calles sent at 6/2/2023  3:28 PM CDT -----  Type: Needs Medical Advice  Who Called:  pt     Best Call Back Number: [phone]    Additional Information: pt is requesting a call back in regards to [employer] and [facility] message , please advise

## 2023-06-05 ENCOUNTER — TELEPHONE (OUTPATIENT)
Dept: FAMILY MEDICINE | Facility: CLINIC | Age: 58
End: 2023-06-05
Payer: MEDICAID

## 2023-06-05 NOTE — TELEPHONE ENCOUNTER
----- Message from Mona Hayakashernestina sent at 6/5/2023 12:17 PM CDT -----  Contact: Patient  Type:  Needs Medical Advice     Who Called:   Patient     Would the patient rather a call back or a response via [employer]?  Call back  Best Call Back Number:  [phone]     Additional Information: States the muscle relaxer is not working - states she is having a hard time moving period - states really bad last couple of days - states right butt muscle will not relax - also states [employer] [facility] has sent a couple of faxes requesting a prescription be sent to them for adult diapers - please call to advise/discuss these issues - thank you pt stated she's been calling since 06/01 and no one has returned her calls please advise asap, thanks!

## 2023-06-06 ENCOUNTER — PATIENT MESSAGE (OUTPATIENT)
Dept: FAMILY MEDICINE | Facility: CLINIC | Age: 58
End: 2023-06-06
Payer: MEDICAID

## 2023-06-06 NOTE — TELEPHONE ENCOUNTER
I would try taking 8 mg of the tizanidine at a time. I have not gotten paperwork about the diapers yet

## 2023-06-11 DIAGNOSIS — M54.16 LUMBAR RADICULOPATHY, CHRONIC: Primary | ICD-10-CM

## 2023-06-21 ENCOUNTER — PATIENT MESSAGE (OUTPATIENT)
Dept: FAMILY MEDICINE | Facility: CLINIC | Age: 58
End: 2023-06-21
Payer: MEDICAID

## 2023-06-27 ENCOUNTER — TELEPHONE (OUTPATIENT)
Dept: FAMILY MEDICINE | Facility: CLINIC | Age: 58
End: 2023-06-27
Payer: MEDICAID

## 2023-06-27 NOTE — TELEPHONE ENCOUNTER
----- Message from Earline Gutierrez sent at 6/27/2023 10:45 AM CDT -----  Contact: self  Type:  Needs Medical Advice    Who Called: self  Would the patient rather a call back or a response via [facility]? call  Best Call Back Number: [phone] (home)     Additional Information: pt needs to speak to dr la about her Neurosurgery referral. Please advise and thank you

## 2023-06-27 NOTE — TELEPHONE ENCOUNTER
"Pt states, " Dr. Orona has not received neurology referral. Please resend to [phone]"   Referral re-faxed to Dr. Orona [phone]  "

## 2023-06-30 ENCOUNTER — OFFICE VISIT (OUTPATIENT)
Dept: FAMILY MEDICINE | Facility: CLINIC | Age: 58
End: 2023-06-30
Payer: MEDICAID

## 2023-06-30 VITALS
HEIGHT: 67 IN | DIASTOLIC BLOOD PRESSURE: 100 MMHG | WEIGHT: 259.38 LBS | SYSTOLIC BLOOD PRESSURE: 160 MMHG | TEMPERATURE: 98 F | OXYGEN SATURATION: 97 % | RESPIRATION RATE: 18 BRPM | HEART RATE: 103 BPM | BODY MASS INDEX: 40.71 KG/M2

## 2023-06-30 DIAGNOSIS — M54.16 LUMBAR RADICULOPATHY, CHRONIC: Primary | ICD-10-CM

## 2023-06-30 PROCEDURE — 3080F PR MOST RECENT DIASTOLIC BLOOD PRESSURE >= 90 MM HG: ICD-10-PCS | Mod: CPTII,,, | Performed by: FAMILY MEDICINE

## 2023-06-30 PROCEDURE — 4010F PR ACE/ARB THEARPY RXD/TAKEN: ICD-10-PCS | Mod: CPTII,,, | Performed by: FAMILY MEDICINE

## 2023-06-30 PROCEDURE — 1160F RVW MEDS BY RX/DR IN RCRD: CPT | Mod: CPTII,,, | Performed by: FAMILY MEDICINE

## 2023-06-30 PROCEDURE — 99213 OFFICE O/P EST LOW 20 MIN: CPT | Mod: PBBFAC | Performed by: FAMILY MEDICINE

## 2023-06-30 PROCEDURE — 3008F BODY MASS INDEX DOCD: CPT | Mod: CPTII,,, | Performed by: FAMILY MEDICINE

## 2023-06-30 PROCEDURE — 1159F PR MEDICATION LIST DOCUMENTED IN MEDICAL RECORD: ICD-10-PCS | Mod: CPTII,,, | Performed by: FAMILY MEDICINE

## 2023-06-30 PROCEDURE — 4010F ACE/ARB THERAPY RXD/TAKEN: CPT | Mod: CPTII,,, | Performed by: FAMILY MEDICINE

## 2023-06-30 PROCEDURE — 96372 THER/PROPH/DIAG INJ SC/IM: CPT | Mod: PBBFAC

## 2023-06-30 PROCEDURE — 3077F PR MOST RECENT SYSTOLIC BLOOD PRESSURE >= 140 MM HG: ICD-10-PCS | Mod: CPTII,,, | Performed by: FAMILY MEDICINE

## 2023-06-30 PROCEDURE — 99999 PR PBB SHADOW E&M-EST. PATIENT-LVL III: CPT | Mod: PBBFAC,,, | Performed by: FAMILY MEDICINE

## 2023-06-30 PROCEDURE — 1160F PR REVIEW ALL MEDS BY PRESCRIBER/CLIN PHARMACIST DOCUMENTED: ICD-10-PCS | Mod: CPTII,,, | Performed by: FAMILY MEDICINE

## 2023-06-30 PROCEDURE — 1159F MED LIST DOCD IN RCRD: CPT | Mod: CPTII,,, | Performed by: FAMILY MEDICINE

## 2023-06-30 PROCEDURE — 99999 PR PBB SHADOW E&M-EST. PATIENT-LVL III: ICD-10-PCS | Mod: PBBFAC,,, | Performed by: FAMILY MEDICINE

## 2023-06-30 PROCEDURE — 3008F PR BODY MASS INDEX (BMI) DOCUMENTED: ICD-10-PCS | Mod: CPTII,,, | Performed by: FAMILY MEDICINE

## 2023-06-30 PROCEDURE — 99214 OFFICE O/P EST MOD 30 MIN: CPT | Mod: S$PBB,,, | Performed by: FAMILY MEDICINE

## 2023-06-30 PROCEDURE — 99214 PR OFFICE/OUTPT VISIT, EST, LEVL IV, 30-39 MIN: ICD-10-PCS | Mod: S$PBB,,, | Performed by: FAMILY MEDICINE

## 2023-06-30 PROCEDURE — 3080F DIAST BP >= 90 MM HG: CPT | Mod: CPTII,,, | Performed by: FAMILY MEDICINE

## 2023-06-30 PROCEDURE — 3077F SYST BP >= 140 MM HG: CPT | Mod: CPTII,,, | Performed by: FAMILY MEDICINE

## 2023-06-30 RX ORDER — PREGABALIN 50 MG/1
50 CAPSULE ORAL 2 TIMES DAILY
Qty: 60 CAPSULE | Refills: 0 | Status: SHIPPED | OUTPATIENT
Start: 2023-06-30 | End: 2023-07-30 | Stop reason: SDUPTHER

## 2023-06-30 RX ORDER — KETOROLAC TROMETHAMINE 30 MG/ML
30 INJECTION, SOLUTION INTRAMUSCULAR; INTRAVENOUS
Status: COMPLETED | OUTPATIENT
Start: 2023-06-30 | End: 2023-06-30

## 2023-06-30 RX ORDER — DICLOFENAC SODIUM 75 MG/1
75 TABLET, DELAYED RELEASE ORAL 2 TIMES DAILY PRN
Qty: 60 TABLET | Refills: 1 | Status: SHIPPED | OUTPATIENT
Start: 2023-06-30

## 2023-06-30 RX ADMIN — KETOROLAC TROMETHAMINE 30 MG: 30 INJECTION, SOLUTION INTRAMUSCULAR; INTRAVENOUS at 04:06

## 2023-06-30 NOTE — PROGRESS NOTES
[facility] - Clinic Note    Subjective      Ms. Anderson is a 57 y.o. female who presents to clinic for Back Pain    Patient has a history of bipolar disorder, depression, PTSD, schizophrenia.  She is been off of her Seroquel for some time because she has not seen the psychiatrist.  She also has chronic pain in her lower back.  This has been going on and off for the last 4 years.  Currently on disability.  Has been seeing the chiropractor but has continued pain in her lower back, hips, down her legs.  Also has some urinary incontinence.    Interval history: Back pain is severe.  Neurosurgery office has received the referral but has not scheduled the appointment yet.    PMH Dinora has a past medical history of Bipolar 1 disorder, Bipolar 1 disorder, Depression, Polysubstance abuse, PTSD (post-traumatic stress disorder), Schizoaffective disorder, and Schizophrenia.   PSXH Dinora has a past surgical history that includes Tubal ligation.    Dinora's family history is not on file.    Dinora reports that she has quit smoking. She quit smokeless tobacco use about 9 years ago. She reports that she does not drink alcohol and does not use drugs.   ALG Dinora is allergic to codeine and pcn [penicillins].   MED Dinora has a current medication list which includes the following prescription(s): citalopram, losartan, quetiapine, diclofenac, omeprazole, and pregabalin, and the following Facility-Administered Medications: ketorolac and ketorolac.     Review of Systems   Constitutional:  Negative for chills and fever.   HENT:  Negative for congestion and rhinorrhea.    Eyes:  Negative for visual disturbance.   Respiratory:  Negative for cough and shortness of breath.    Cardiovascular:  Negative for chest pain.   Gastrointestinal:  Negative for abdominal pain, constipation, diarrhea, nausea and vomiting.   Genitourinary:  Negative for dysuria.   Musculoskeletal:  Positive for back pain. Negative for myalgias.   Skin:  Negative  "for rash.   Neurological:  Negative for weakness and headaches.   Objective     BP (!) 160/100 (BP Location: Left arm, Patient Position: Sitting, BP Method: Large (Manual))   Pulse 103   Temp 97.8 °F (36.6 °C) (Temporal)   Resp 18   Ht 5' 7" (1.702 m)   Wt 117.7 kg (259 lb 6.4 oz)   SpO2 97%   BMI 40.63 kg/m²     Physical Exam  Vitals and nursing note reviewed.   Constitutional:       General: She is not in acute distress.     Appearance: Normal appearance. She is well-developed. She is not diaphoretic.   HENT:      Head: Normocephalic and atraumatic.      Right Ear: External ear normal.      Left Ear: External ear normal.   Eyes:      General:         Right eye: No discharge.         Left eye: No discharge.   Cardiovascular:      Rate and Rhythm: Normal rate and regular rhythm.      Heart sounds: Normal heart sounds.   Pulmonary:      Effort: Pulmonary effort is normal.      Breath sounds: Normal breath sounds. No wheezing or rales.   Skin:     General: Skin is warm and dry.   Neurological:      Mental Status: She is alert and oriented to person, place, and time. Mental status is at baseline.   Psychiatric:         Mood and Affect: Mood normal.         Behavior: Behavior normal.         Thought Content: Thought content normal.         Judgment: Judgment normal.      Assessment/Plan     1. Lumbar radiculopathy, chronic  ketorolac injection 30 mg    pregabalin (LYRICA) 50 MG capsule    diclofenac (VOLTAREN) 75 MG EC tablet    ketorolac injection 30 mg        Toradol injection today for pain.  Trial diclofenac and Lyrica to see if that is helpful for her symptoms until she is able to see the neurosurgeon.    Future Appointments   Date Time Provider Department Center   7/17/2023  3:40 PM Sanjay Saleh MD [facility]         Sanjay Saleh MD  Family Medicine  [facility]              "

## 2023-07-04 ENCOUNTER — PATIENT MESSAGE (OUTPATIENT)
Dept: FAMILY MEDICINE | Facility: CLINIC | Age: 58
End: 2023-07-04
Payer: MEDICAID

## 2023-07-17 ENCOUNTER — OFFICE VISIT (OUTPATIENT)
Dept: FAMILY MEDICINE | Facility: CLINIC | Age: 58
End: 2023-07-17
Payer: MEDICAID

## 2023-07-17 VITALS
RESPIRATION RATE: 18 BRPM | BODY MASS INDEX: 40.65 KG/M2 | DIASTOLIC BLOOD PRESSURE: 84 MMHG | HEIGHT: 67 IN | OXYGEN SATURATION: 99 % | SYSTOLIC BLOOD PRESSURE: 136 MMHG | TEMPERATURE: 98 F | WEIGHT: 259 LBS | HEART RATE: 99 BPM

## 2023-07-17 DIAGNOSIS — M54.16 LUMBAR RADICULOPATHY, CHRONIC: Primary | ICD-10-CM

## 2023-07-17 PROCEDURE — 3075F PR MOST RECENT SYSTOLIC BLOOD PRESS GE 130-139MM HG: ICD-10-PCS | Mod: CPTII,,, | Performed by: FAMILY MEDICINE

## 2023-07-17 PROCEDURE — 99999 PR PBB SHADOW E&M-EST. PATIENT-LVL III: CPT | Mod: PBBFAC,,, | Performed by: FAMILY MEDICINE

## 2023-07-17 PROCEDURE — 4010F ACE/ARB THERAPY RXD/TAKEN: CPT | Mod: CPTII,,, | Performed by: FAMILY MEDICINE

## 2023-07-17 PROCEDURE — 99214 PR OFFICE/OUTPT VISIT, EST, LEVL IV, 30-39 MIN: ICD-10-PCS | Mod: S$PBB,,, | Performed by: FAMILY MEDICINE

## 2023-07-17 PROCEDURE — 1159F MED LIST DOCD IN RCRD: CPT | Mod: CPTII,,, | Performed by: FAMILY MEDICINE

## 2023-07-17 PROCEDURE — 3079F PR MOST RECENT DIASTOLIC BLOOD PRESSURE 80-89 MM HG: ICD-10-PCS | Mod: CPTII,,, | Performed by: FAMILY MEDICINE

## 2023-07-17 PROCEDURE — 3008F BODY MASS INDEX DOCD: CPT | Mod: CPTII,,, | Performed by: FAMILY MEDICINE

## 2023-07-17 PROCEDURE — 3008F PR BODY MASS INDEX (BMI) DOCUMENTED: ICD-10-PCS | Mod: CPTII,,, | Performed by: FAMILY MEDICINE

## 2023-07-17 PROCEDURE — 3075F SYST BP GE 130 - 139MM HG: CPT | Mod: CPTII,,, | Performed by: FAMILY MEDICINE

## 2023-07-17 PROCEDURE — 1160F PR REVIEW ALL MEDS BY PRESCRIBER/CLIN PHARMACIST DOCUMENTED: ICD-10-PCS | Mod: CPTII,,, | Performed by: FAMILY MEDICINE

## 2023-07-17 PROCEDURE — 99213 OFFICE O/P EST LOW 20 MIN: CPT | Mod: PBBFAC | Performed by: FAMILY MEDICINE

## 2023-07-17 PROCEDURE — 1160F RVW MEDS BY RX/DR IN RCRD: CPT | Mod: CPTII,,, | Performed by: FAMILY MEDICINE

## 2023-07-17 PROCEDURE — 3079F DIAST BP 80-89 MM HG: CPT | Mod: CPTII,,, | Performed by: FAMILY MEDICINE

## 2023-07-17 PROCEDURE — 4010F PR ACE/ARB THEARPY RXD/TAKEN: ICD-10-PCS | Mod: CPTII,,, | Performed by: FAMILY MEDICINE

## 2023-07-17 PROCEDURE — 99214 OFFICE O/P EST MOD 30 MIN: CPT | Mod: S$PBB,,, | Performed by: FAMILY MEDICINE

## 2023-07-17 PROCEDURE — 1159F PR MEDICATION LIST DOCUMENTED IN MEDICAL RECORD: ICD-10-PCS | Mod: CPTII,,, | Performed by: FAMILY MEDICINE

## 2023-07-17 PROCEDURE — 99999 PR PBB SHADOW E&M-EST. PATIENT-LVL III: ICD-10-PCS | Mod: PBBFAC,,, | Performed by: FAMILY MEDICINE

## 2023-07-17 NOTE — PROGRESS NOTES
[facility] - Clinic Note    Subjective      Ms. Anderson is a 57 y.o. female who presents to clinic for Follow-up (1mth follow up)    Patient has a history of bipolar disorder, depression, PTSD, schizophrenia.  She is been off of her Seroquel for some time because she has not seen the psychiatrist.  She also has chronic pain in her lower back.  This has been going on and off for the last 4 years.  Currently on disability.  Has been seeing the chiropractor but has continued pain in her lower back, hips, down her legs.  Also has some urinary incontinence.    Interval history:  Back pain has improved as well as neuropathy with Lyrica but seems like there could be more benefit    PMH Dinora has a past medical history of Bipolar 1 disorder, Bipolar 1 disorder, Depression, Polysubstance abuse, PTSD (post-traumatic stress disorder), Schizoaffective disorder, and Schizophrenia.   PSXH Dinora has a past surgical history that includes Tubal ligation.    Dinora's family history is not on file.   SH Dinora reports that she has quit smoking. She quit smokeless tobacco use about 9 years ago. She reports that she does not drink alcohol and does not use drugs.   ALG Dinora is allergic to codeine and pcn [penicillins].   MED Dinora has a current medication list which includes the following prescription(s): citalopram, diclofenac, losartan, pregabalin, quetiapine, and omeprazole.     Review of Systems   Constitutional:  Negative for chills and fever.   HENT:  Negative for congestion and rhinorrhea.    Eyes:  Negative for visual disturbance.   Respiratory:  Negative for cough and shortness of breath.    Cardiovascular:  Negative for chest pain.   Gastrointestinal:  Negative for abdominal pain, constipation, diarrhea, nausea and vomiting.   Genitourinary:  Negative for dysuria.   Musculoskeletal:  Positive for back pain. Negative for myalgias.   Skin:  Negative for rash.   Neurological:  Negative for weakness and headaches.  "  Objective     /84 (BP Location: Left arm, Patient Position: Sitting, BP Method: Large (Manual))   Pulse 99   Temp 97.8 °F (36.6 °C) (Temporal)   Resp 18   Ht 5' 7" (1.702 m)   Wt 117.5 kg (259 lb)   SpO2 99%   BMI 40.57 kg/m²     Physical Exam  Vitals and nursing note reviewed.   Constitutional:       General: She is not in acute distress.     Appearance: Normal appearance. She is well-developed. She is not diaphoretic.   HENT:      Head: Normocephalic and atraumatic.      Right Ear: External ear normal.      Left Ear: External ear normal.   Eyes:      General:         Right eye: No discharge.         Left eye: No discharge.   Cardiovascular:      Rate and Rhythm: Normal rate and regular rhythm.      Heart sounds: Normal heart sounds.   Pulmonary:      Effort: Pulmonary effort is normal.      Breath sounds: Normal breath sounds. No wheezing or rales.   Skin:     General: Skin is warm and dry.   Neurological:      Mental Status: She is alert and oriented to person, place, and time. Mental status is at baseline.   Psychiatric:         Mood and Affect: Mood normal.         Behavior: Behavior normal.         Thought Content: Thought content normal.         Judgment: Judgment normal.      Assessment/Plan     1. Lumbar radiculopathy, chronic          Trial increasing Lyrica to 50 mg 3 times a day.  Patient is calling her insurance company about seeing the neurosurgeon.    No future appointments.        Sanjay Saleh MD  Family Medicine  [facility]                "

## 2023-07-27 ENCOUNTER — PATIENT OUTREACH (OUTPATIENT)
Dept: ADMINISTRATIVE | Facility: HOSPITAL | Age: 58
End: 2023-07-27
Payer: MEDICAID

## 2023-07-30 DIAGNOSIS — M54.16 LUMBAR RADICULOPATHY, CHRONIC: ICD-10-CM

## 2023-08-01 RX ORDER — PREGABALIN 50 MG/1
50 CAPSULE ORAL 3 TIMES DAILY
Qty: 90 CAPSULE | Refills: 1 | Status: SHIPPED | OUTPATIENT
Start: 2023-08-01 | End: 2023-09-27 | Stop reason: SDUPTHER

## 2023-09-14 ENCOUNTER — TELEPHONE (OUTPATIENT)
Dept: FAMILY MEDICINE | Facility: CLINIC | Age: 58
End: 2023-09-14
Payer: MEDICAID

## 2023-09-14 DIAGNOSIS — M54.16 LUMBAR RADICULOPATHY, CHRONIC: Primary | ICD-10-CM

## 2023-09-14 NOTE — TELEPHONE ENCOUNTER
----- Message from Kanika Ashley Benjamin sent at 9/14/2023 12:43 PM CDT -----  Type:  Return Patient Call    Who Called:Pt  Would the patient rather a call back or a response via [facility]? Call  Best Call Back Number:[phone]  Additional Information: pt states she was referred to Dr Orona in June and she still hasn't been seen by . She states that she can barely walk and is very distraught. She would like to be referred to someone else that could possibly get her in sooner.

## 2023-09-26 ENCOUNTER — TELEPHONE (OUTPATIENT)
Dept: FAMILY MEDICINE | Facility: CLINIC | Age: 58
End: 2023-09-26
Payer: MEDICAID

## 2023-09-26 NOTE — TELEPHONE ENCOUNTER
----- Message from Clary Lopez LPN sent at 9/26/2023  1:52 PM CDT -----    ----- Message -----  From: Loyd Hutchins  Sent: 9/26/2023   1:50 PM CDT  To: [facility]se Perkins Staff    Type: Needs Medical Advice  Who Called:  pt  Symptoms (please be specific):  M54.16 (ICD-10-CM) - Lumbar radiculopathy, chronic    Best Call Back Number: [phone]    Additional Information: Pt states she would like to speak to office for a new referral  Pt states  she had appt scheduled and on day of appt provder cancelled  Please advise  Thank you

## 2023-09-27 ENCOUNTER — TELEPHONE (OUTPATIENT)
Dept: FAMILY MEDICINE | Facility: CLINIC | Age: 58
End: 2023-09-27
Payer: MEDICAID

## 2023-09-27 DIAGNOSIS — M54.16 LUMBAR RADICULOPATHY, CHRONIC: ICD-10-CM

## 2023-09-27 RX ORDER — PREGABALIN 100 MG/1
100 CAPSULE ORAL 3 TIMES DAILY
Qty: 90 CAPSULE | Refills: 0 | Status: SHIPPED | OUTPATIENT
Start: 2023-09-27

## 2023-09-27 NOTE — TELEPHONE ENCOUNTER
----- Message from Soledad Hwang, Patient Care Assistant sent at 9/27/2023  2:29 PM CDT -----  Regarding: orders  Contact: pt  Type: Needs Medical Advice    Who Called:  pt     Best Call Back Number: [phone] (home)       Additional Information: pt states she would like a callback regarding an referral. Please call to advise. Thanks!

## 2023-09-27 NOTE — TELEPHONE ENCOUNTER
----- Message from Loyd Hutchins sent at 9/27/2023  1:31 PM CDT -----  Type:  Patient Returning Call    Who Called:  pt  Who Left Message for Patient:  Clary  Does the patient know what this is regarding?:  no  Best Call Back Number:  [phone]   Additional Information:  Thanks

## 2023-09-27 NOTE — TELEPHONE ENCOUNTER
----- Message from Loyd Hutchins sent at 9/27/2023  1:31 PM CDT -----  Type:  RX Refill Request    Who Called:  pt  Refill or New Rx:  New  RX Name and Strength:  pain med  How is the patient currently taking it? (ex. 1XDay):  n/a  Is this a 30 day or 90 day RX:  30  Preferred Pharmacy with phone number:    [facility] [address] [place] [id number]  Phone: [phone] Fax: [phone]      Local or Mail Order:  local  Ordering Provider:  Therese  [facility] Call Back Number:  [phone]    Additional Information:  Pt states she would like a pain med called in  [facility] is no longer working.   Notify pt via my chart  Thank you

## 2023-09-28 ENCOUNTER — PATIENT MESSAGE (OUTPATIENT)
Dept: FAMILY MEDICINE | Facility: CLINIC | Age: 58
End: 2023-09-28
Payer: MEDICAID

## 2023-10-04 ENCOUNTER — OFFICE VISIT (OUTPATIENT)
Dept: FAMILY MEDICINE | Facility: CLINIC | Age: 58
End: 2023-10-04
Payer: MEDICAID

## 2023-10-04 VITALS
OXYGEN SATURATION: 97 % | DIASTOLIC BLOOD PRESSURE: 118 MMHG | HEIGHT: 67 IN | RESPIRATION RATE: 18 BRPM | WEIGHT: 260 LBS | BODY MASS INDEX: 40.81 KG/M2 | SYSTOLIC BLOOD PRESSURE: 142 MMHG | TEMPERATURE: 98 F | HEART RATE: 97 BPM

## 2023-10-04 DIAGNOSIS — M54.16 LUMBAR RADICULOPATHY, CHRONIC: Primary | ICD-10-CM

## 2023-10-04 PROCEDURE — 99214 PR OFFICE/OUTPT VISIT, EST, LEVL IV, 30-39 MIN: ICD-10-PCS | Mod: S$PBB,,, | Performed by: FAMILY MEDICINE

## 2023-10-04 PROCEDURE — 99214 OFFICE O/P EST MOD 30 MIN: CPT | Mod: PBBFAC | Performed by: FAMILY MEDICINE

## 2023-10-04 PROCEDURE — 3008F BODY MASS INDEX DOCD: CPT | Mod: CPTII,,, | Performed by: FAMILY MEDICINE

## 2023-10-04 PROCEDURE — 4010F PR ACE/ARB THEARPY RXD/TAKEN: ICD-10-PCS | Mod: CPTII,,, | Performed by: FAMILY MEDICINE

## 2023-10-04 PROCEDURE — 1160F RVW MEDS BY RX/DR IN RCRD: CPT | Mod: CPTII,,, | Performed by: FAMILY MEDICINE

## 2023-10-04 PROCEDURE — 3077F PR MOST RECENT SYSTOLIC BLOOD PRESSURE >= 140 MM HG: ICD-10-PCS | Mod: CPTII,,, | Performed by: FAMILY MEDICINE

## 2023-10-04 PROCEDURE — 1160F PR REVIEW ALL MEDS BY PRESCRIBER/CLIN PHARMACIST DOCUMENTED: ICD-10-PCS | Mod: CPTII,,, | Performed by: FAMILY MEDICINE

## 2023-10-04 PROCEDURE — 4010F ACE/ARB THERAPY RXD/TAKEN: CPT | Mod: CPTII,,, | Performed by: FAMILY MEDICINE

## 2023-10-04 PROCEDURE — 1159F PR MEDICATION LIST DOCUMENTED IN MEDICAL RECORD: ICD-10-PCS | Mod: CPTII,,, | Performed by: FAMILY MEDICINE

## 2023-10-04 PROCEDURE — 3080F DIAST BP >= 90 MM HG: CPT | Mod: CPTII,,, | Performed by: FAMILY MEDICINE

## 2023-10-04 PROCEDURE — 3008F PR BODY MASS INDEX (BMI) DOCUMENTED: ICD-10-PCS | Mod: CPTII,,, | Performed by: FAMILY MEDICINE

## 2023-10-04 PROCEDURE — 99214 OFFICE O/P EST MOD 30 MIN: CPT | Mod: S$PBB,,, | Performed by: FAMILY MEDICINE

## 2023-10-04 PROCEDURE — 99999 PR PBB SHADOW E&M-EST. PATIENT-LVL IV: CPT | Mod: PBBFAC,,, | Performed by: FAMILY MEDICINE

## 2023-10-04 PROCEDURE — 3080F PR MOST RECENT DIASTOLIC BLOOD PRESSURE >= 90 MM HG: ICD-10-PCS | Mod: CPTII,,, | Performed by: FAMILY MEDICINE

## 2023-10-04 PROCEDURE — 99999 PR PBB SHADOW E&M-EST. PATIENT-LVL IV: ICD-10-PCS | Mod: PBBFAC,,, | Performed by: FAMILY MEDICINE

## 2023-10-04 PROCEDURE — 1159F MED LIST DOCD IN RCRD: CPT | Mod: CPTII,,, | Performed by: FAMILY MEDICINE

## 2023-10-04 PROCEDURE — 3077F SYST BP >= 140 MM HG: CPT | Mod: CPTII,,, | Performed by: FAMILY MEDICINE

## 2023-10-04 RX ORDER — TRAMADOL HYDROCHLORIDE 50 MG/1
50 TABLET ORAL EVERY 12 HOURS PRN
Qty: 30 TABLET | Refills: 0 | Status: SHIPPED | OUTPATIENT
Start: 2023-10-04

## 2023-10-04 NOTE — PROGRESS NOTES
[facility] - Clinic Note    Subjective      Ms. Anderson is a 57 y.o. female who presents to clinic for Hip Pain (Right side pain of glut, back, and hip. /Mental health is also an issue. )    Patient has a history of bipolar disorder, depression, PTSD, schizophrenia.  She is been off of her Seroquel for some time because she has not seen the psychiatrist.  She also has chronic pain in her lower back.  This has been going on and off for the last 4 years.  Currently on disability.  Has been seeing the chiropractor but has continued pain in her lower back, hips, down her legs.  Also has some urinary incontinence.    Interval history:  Back pain has worsened.    PMH Dinora has a past medical history of Bipolar 1 disorder, Bipolar 1 disorder, Depression, Polysubstance abuse, PTSD (post-traumatic stress disorder), Schizoaffective disorder, and Schizophrenia.   PSXH Dinora has a past surgical history that includes Tubal ligation.    Dinora's family history is not on file.   SH Dinora reports that she has quit smoking. She quit smokeless tobacco use about 10 years ago. She reports that she does not drink alcohol and does not use drugs.   ALG Dinora is allergic to codeine and pcn [penicillins].   MED Dinora has a current medication list which includes the following prescription(s): citalopram, diclofenac, losartan, pregabalin, quetiapine, omeprazole, and tramadol.     Review of Systems   Constitutional:  Negative for chills and fever.   HENT:  Negative for congestion and rhinorrhea.    Eyes:  Negative for visual disturbance.   Respiratory:  Negative for cough and shortness of breath.    Cardiovascular:  Negative for chest pain.   Gastrointestinal:  Negative for abdominal pain, constipation, diarrhea, nausea and vomiting.   Genitourinary:  Negative for dysuria.   Musculoskeletal:  Positive for back pain. Negative for myalgias.   Skin:  Negative for rash.   Neurological:  Negative for weakness and headaches.     Objective  "    BP (!) 142/118 (BP Location: Right forearm, Patient Position: Sitting, BP Method: Large (Manual))   Pulse 97   Temp 97.8 °F (36.6 °C) (Temporal)   Resp 18   Ht 5' 7" (1.702 m)   Wt 117.9 kg (260 lb)   SpO2 97%   BMI 40.72 kg/m²     Physical Exam  Vitals and nursing note reviewed.   Constitutional:       General: She is not in acute distress.     Appearance: Normal appearance. She is well-developed. She is not diaphoretic.   HENT:      Head: Normocephalic and atraumatic.      Right Ear: External ear normal.      Left Ear: External ear normal.   Eyes:      General:         Right eye: No discharge.         Left eye: No discharge.   Cardiovascular:      Rate and Rhythm: Normal rate and regular rhythm.      Heart sounds: Normal heart sounds.   Pulmonary:      Effort: Pulmonary effort is normal.      Breath sounds: Normal breath sounds. No wheezing or rales.   Skin:     General: Skin is warm and dry.   Neurological:      Mental Status: She is alert and oriented to person, place, and time. Mental status is at baseline.   Psychiatric:         Mood and Affect: Mood normal.         Behavior: Behavior normal.         Thought Content: Thought content normal.         Judgment: Judgment normal.        Assessment/Plan     1. Lumbar radiculopathy, chronic  Ambulatory referral/consult to Pain Clinic    traMADoL (ULTRAM) 50 mg tablet        Will refer to pain management.  Limited prescription tramadol prescribed for breakthrough pain.   reviewed.  No red flags.  Continue Lyrica.Patient is calling her insurance company about seeing the neurosurgeon.    No future appointments.        Sanjay Saleh MD  Family Medicine  [facility]                "

## 2023-10-13 ENCOUNTER — PATIENT MESSAGE (OUTPATIENT)
Dept: ADMINISTRATIVE | Facility: HOSPITAL | Age: 58
End: 2023-10-13
Payer: MEDICAID

## 2023-10-13 ENCOUNTER — PATIENT OUTREACH (OUTPATIENT)
Dept: ADMINISTRATIVE | Facility: HOSPITAL | Age: 58
End: 2023-10-13
Payer: MEDICAID

## 2023-10-13 NOTE — PROGRESS NOTES

## 2023-10-17 ENCOUNTER — PATIENT MESSAGE (OUTPATIENT)
Dept: ADMINISTRATIVE | Facility: HOSPITAL | Age: 58
End: 2023-10-17
Payer: MEDICAID

## 2023-10-17 ENCOUNTER — PATIENT OUTREACH (OUTPATIENT)
Dept: ADMINISTRATIVE | Facility: HOSPITAL | Age: 58
End: 2023-10-17
Payer: MEDICAID

## 2023-10-17 NOTE — PROGRESS NOTES
Population Health Chart Review & Patient Outreach Details:     Reason for Outreach Encounter:     [x]  Non-Compliant Report   []  Payor Report ([employer], PHN, BCBS, MSSP, MCIP, UHC, etc.)   []  Pre-Visit Chart Review     Updates Requested / Reviewed:     []  Care Everywhere    []     []  External Sources ([facility], [facility], DIS, etc.)   []  Care Team Updated    Patient Outreach Method:    [x]  Telephone Outreach Completed   [] Successful   [x] Left Voicemail   [] Unable to Contact (wrong number, no voicemail)  [x]  [facility] Outreach Sent  []  Letter Outreach Mailed  []  Fax Sent for External Records  []  External Records Upload    Health Maintenance Topics Addressed and Outreach Outcomes / Actions Taken:        [x]      Breast Cancer Screening []  Mammo Scheduled      []  External Records Requested     []  Added Reminder to Complete to Upcoming Primary Care Appt Notes     []  Patient Declined     []  Patient Will Call Back to Schedule     []  Patient Will Schedule with External Provider / Order Routed if Applicable             [x]       Cervical Cancer Screening []  Pap Scheduled      []  External Records Requested     []  Added Reminder to Complete to Upcoming Primary Care Appt Notes     []  Patient Declined     []  Patient Will Call Back to Schedule     []  Patient Will Schedule with External Provider               [x]          Colorectal Cancer Screening []  Colonoscopy Case Request or Referral Placed     []  External Records Requested     []  Added Reminder to Complete to Upcoming Primary Care Appt Notes     []  Patient Declined     []  Patient Will Call Back to Schedule     []  Patient Will Schedule with External Provider     []  Fit Kit Mailed (add the SmartPhrase under additional notes)     []  Reminded Patient to Complete Home Test             []      Diabetic Eye Exam []  Eye Camera Scheduled or Optometry Referral Placed     []  External Records Requested     []  Added Reminder to Complete  to Upcoming Primary Care Appt Notes     []  Patient Declined     []  Patient Will Call Back to Schedule     []  Patient Will Schedule with External Provider             []      Blood Pressure Control []  Primary Care Follow Up Visit Scheduled     []  Remote Blood Pressure Reading Captured     []  Added Reminder to Complete to Upcoming Primary Care Appt Notes     []  Patient Declined     []  Patient Will Call Back / Patient Will Send Portal Message with Reading     []  Patient Will Call Back to Schedule Provider Visit             [x]       HbA1c & Other Labs []  Lab Appt Scheduled for Due Labs     []  Primary Care Follow Up Visit Scheduled      []  Reminded Patient to Complete Home Test     []  Added Reminder to Complete to Upcoming Primary Care Appt Notes     []  Patient Declined     []  Patient Will Call Back to Schedule     []  Patient Will Schedule with External Provider / Order Routed if Applicable           []    Schedule Primary Care Appt []  Primary Care Appt Scheduled     []  Patient Declined     []  Patient Will Call Back to Schedule     []  Pt Established with External Provider & Updated Care Team             []      Medication Adherence []  Primary Care Appointment Scheduled     []  Added Reminder to Upcoming Primary Care Appt Notes     []  Patient Reminded to  Prescription     []  Patient Declined, Provider Notified if Needed     []  Sent Provider Message to Review and/or Add Exclusion to Problem List             []      Osteoporosis Screening []  DXA Appointment Scheduled     []  External Records Requested     []  Added Reminder to Complete to Upcoming Primary Care Appt Notes     []  Patient Declined     []  Patient Will Call Back to Schedule     []  Patient Will Schedule with External Provider / Order Routed if Applicable     Additional Care Coordinator Notes:     Attempted to reach pt by phone, no answer, left VM, sending portal/letter outreach at this time about campaign response    Further  Action Needed If Patient Returns Outreach:

## 2024-07-21 ENCOUNTER — HOSPITAL ENCOUNTER (INPATIENT)
Facility: HOSPITAL | Age: 59
LOS: 1 days | Discharge: ANOTHER HOSPITAL | End: 2024-07-22
Attending: EMERGENCY MEDICINE | Admitting: STUDENT IN AN ORGANIZED HEALTH CARE EDUCATION/TRAINING PROGRAM
Payer: MEDICAID
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
## 2024-07-21 DIAGNOSIS — R42 DIZZINESS: ICD-10-CM

## 2024-07-21 DIAGNOSIS — R53.1 WEAKNESS: ICD-10-CM

## 2024-07-21 PROBLEM — R29.90 STROKE-LIKE SYMPTOM: Status: ACTIVE | Noted: 2024-07-21

## 2024-07-21 PROBLEM — R15.9 INCONTINENCE OF BOWEL: Status: ACTIVE | Noted: 2024-07-21

## 2024-07-21 PROBLEM — R29.898 LOWER EXTREMITY WEAKNESS: Status: ACTIVE | Noted: 2024-07-21

## 2024-07-21 PROBLEM — R32 BLADDER INCONTINENCE: Status: ACTIVE | Noted: 2024-07-21

## 2024-07-21 LAB
ALBUMIN SERPL BCP-MCNC: 3.6 G/DL (ref 3.5–5.2)
ALP SERPL-CCNC: 153 U/L (ref 55–135)
ALT SERPL W/O P-5'-P-CCNC: 59 U/L (ref 10–44)
AMPHET+METHAMPHET UR QL: NEGATIVE
ANION GAP SERPL CALC-SCNC: 13 MMOL/L (ref 8–16)
AST SERPL-CCNC: 50 U/L (ref 10–40)
BACTERIA #/AREA URNS HPF: ABNORMAL /HPF
BARBITURATES UR QL SCN>200 NG/ML: NEGATIVE
BASOPHILS # BLD AUTO: 0.04 K/UL (ref 0–0.2)
BASOPHILS NFR BLD: 0.6 % (ref 0–1.9)
BENZODIAZ UR QL SCN>200 NG/ML: NEGATIVE
BILIRUB SERPL-MCNC: 0.7 MG/DL (ref 0.1–1)
BILIRUB UR QL STRIP: NEGATIVE
BUN SERPL-MCNC: 11 MG/DL (ref 6–20)
BZE UR QL SCN: NEGATIVE
CALCIUM SERPL-MCNC: 9.2 MG/DL (ref 8.7–10.5)
CANNABINOIDS UR QL SCN: ABNORMAL
CHLORIDE SERPL-SCNC: 106 MMOL/L (ref 95–110)
CHOLEST SERPL-MCNC: 192 MG/DL (ref 120–199)
CHOLEST/HDLC SERPL: 4.4 {RATIO} (ref 2–5)
CK SERPL-CCNC: 57 U/L (ref 20–180)
CLARITY UR: ABNORMAL
CO2 SERPL-SCNC: 19 MMOL/L (ref 23–29)
COLOR UR: YELLOW
CREAT SERPL-MCNC: 0.7 MG/DL (ref 0.5–1.4)
CREAT UR-MCNC: 148.9 MG/DL (ref 15–325)
DIFFERENTIAL METHOD BLD: NORMAL
EOSINOPHIL # BLD AUTO: 0 K/UL (ref 0–0.5)
EOSINOPHIL NFR BLD: 0.4 % (ref 0–8)
ERYTHROCYTE [DISTWIDTH] IN BLOOD BY AUTOMATED COUNT: 14.5 % (ref 11.5–14.5)
EST. GFR  (NO RACE VARIABLE): >60 ML/MIN/1.73 M^2
ESTIMATED AVG GLUCOSE: 120 MG/DL (ref 68–131)
ETHANOL SERPL-MCNC: <10 MG/DL (ref 0–10)
GLUCOSE SERPL-MCNC: 149 MG/DL (ref 70–110)
GLUCOSE UR QL STRIP: NEGATIVE
HBA1C MFR BLD: 5.8 % (ref 4–5.6)
HCT VFR BLD AUTO: 47.5 % (ref 37–48.5)
HDLC SERPL-MCNC: 44 MG/DL (ref 40–75)
HDLC SERPL: 22.9 % (ref 20–50)
HGB BLD-MCNC: 15.8 G/DL (ref 12–16)
HGB UR QL STRIP: NEGATIVE
HYALINE CASTS #/AREA URNS LPF: 0 /LPF
IMM GRANULOCYTES # BLD AUTO: 0.03 K/UL (ref 0–0.04)
IMM GRANULOCYTES NFR BLD AUTO: 0.4 % (ref 0–0.5)
KETONES UR QL STRIP: NEGATIVE
LDLC SERPL CALC-MCNC: 124.8 MG/DL (ref 63–159)
LEUKOCYTE ESTERASE UR QL STRIP: NEGATIVE
LYMPHOCYTES # BLD AUTO: 1.4 K/UL (ref 1–4.8)
LYMPHOCYTES NFR BLD: 21.1 % (ref 18–48)
MCH RBC QN AUTO: 29.4 PG (ref 27–31)
MCHC RBC AUTO-ENTMCNC: 33.3 G/DL (ref 32–36)
MCV RBC AUTO: 89 FL (ref 82–98)
METHADONE UR QL SCN>300 NG/ML: NEGATIVE
MICROSCOPIC COMMENT: ABNORMAL
MONOCYTES # BLD AUTO: 0.4 K/UL (ref 0.3–1)
MONOCYTES NFR BLD: 6.3 % (ref 4–15)
NEUTROPHILS # BLD AUTO: 4.9 K/UL (ref 1.8–7.7)
NEUTROPHILS NFR BLD: 71.2 % (ref 38–73)
NITRITE UR QL STRIP: NEGATIVE
NONHDLC SERPL-MCNC: 148 MG/DL
NRBC BLD-RTO: 0 /100 WBC
OPIATES UR QL SCN: NEGATIVE
PCP UR QL SCN>25 NG/ML: NEGATIVE
PH UR STRIP: 6 [PH] (ref 5–8)
PLATELET # BLD AUTO: 154 K/UL (ref 150–450)
PMV BLD AUTO: 10.5 FL (ref 9.2–12.9)
POTASSIUM SERPL-SCNC: 3.9 MMOL/L (ref 3.5–5.1)
PROT SERPL-MCNC: 7.9 G/DL (ref 6–8.4)
PROT UR QL STRIP: ABNORMAL
RBC # BLD AUTO: 5.37 M/UL (ref 4–5.4)
RBC #/AREA URNS HPF: 0 /HPF (ref 0–4)
SODIUM SERPL-SCNC: 138 MMOL/L (ref 136–145)
SP GR UR STRIP: 1.02 (ref 1–1.03)
SQUAMOUS #/AREA URNS HPF: 30 /HPF
TOXICOLOGY INFORMATION: ABNORMAL
TRIGL SERPL-MCNC: 116 MG/DL (ref 30–150)
TROPONIN I SERPL DL<=0.01 NG/ML-MCNC: <0.006 NG/ML (ref 0–0.03)
TSH SERPL DL<=0.005 MIU/L-ACNC: 1.69 UIU/ML (ref 0.4–4)
URN SPEC COLLECT METH UR: ABNORMAL
UROBILINOGEN UR STRIP-ACNC: NEGATIVE EU/DL
WBC # BLD AUTO: 6.82 K/UL (ref 3.9–12.7)
WBC #/AREA URNS HPF: 2 /HPF (ref 0–5)

## 2024-07-21 PROCEDURE — 83036 HEMOGLOBIN GLYCOSYLATED A1C: CPT | Performed by: EMERGENCY MEDICINE

## 2024-07-21 PROCEDURE — 25000003 PHARM REV CODE 250: Performed by: STUDENT IN AN ORGANIZED HEALTH CARE EDUCATION/TRAINING PROGRAM

## 2024-07-21 PROCEDURE — 93010 ELECTROCARDIOGRAM REPORT: CPT | Mod: ,,, | Performed by: INTERNAL MEDICINE

## 2024-07-21 PROCEDURE — 84484 ASSAY OF TROPONIN QUANT: CPT | Performed by: EMERGENCY MEDICINE

## 2024-07-21 PROCEDURE — 36415 COLL VENOUS BLD VENIPUNCTURE: CPT | Performed by: EMERGENCY MEDICINE

## 2024-07-21 PROCEDURE — 63600175 PHARM REV CODE 636 W HCPCS: Mod: UD | Performed by: STUDENT IN AN ORGANIZED HEALTH CARE EDUCATION/TRAINING PROGRAM

## 2024-07-21 PROCEDURE — 63600175 PHARM REV CODE 636 W HCPCS: Mod: UD | Performed by: FAMILY MEDICINE

## 2024-07-21 PROCEDURE — 82077 ASSAY SPEC XCP UR&BREATH IA: CPT | Performed by: EMERGENCY MEDICINE

## 2024-07-21 PROCEDURE — 12000002 HC ACUTE/MED SURGE SEMI-PRIVATE ROOM

## 2024-07-21 PROCEDURE — 72131 CT LUMBAR SPINE W/O DYE: CPT | Mod: 26,,, | Performed by: RADIOLOGY

## 2024-07-21 PROCEDURE — 70450 CT HEAD/BRAIN W/O DYE: CPT | Mod: 26,,, | Performed by: RADIOLOGY

## 2024-07-21 PROCEDURE — 96374 THER/PROPH/DIAG INJ IV PUSH: CPT

## 2024-07-21 PROCEDURE — 81000 URINALYSIS NONAUTO W/SCOPE: CPT | Mod: 59 | Performed by: EMERGENCY MEDICINE

## 2024-07-21 PROCEDURE — 93005 ELECTROCARDIOGRAM TRACING: CPT

## 2024-07-21 PROCEDURE — 96375 TX/PRO/DX INJ NEW DRUG ADDON: CPT

## 2024-07-21 PROCEDURE — 99285 EMERGENCY DEPT VISIT HI MDM: CPT | Mod: 25

## 2024-07-21 PROCEDURE — 63600175 PHARM REV CODE 636 W HCPCS: Mod: UD

## 2024-07-21 PROCEDURE — 25000003 PHARM REV CODE 250: Performed by: EMERGENCY MEDICINE

## 2024-07-21 PROCEDURE — 72128 CT CHEST SPINE W/O DYE: CPT | Mod: 26,,, | Performed by: RADIOLOGY

## 2024-07-21 PROCEDURE — 82550 ASSAY OF CK (CPK): CPT | Performed by: EMERGENCY MEDICINE

## 2024-07-21 PROCEDURE — 72128 CT CHEST SPINE W/O DYE: CPT | Mod: TC

## 2024-07-21 PROCEDURE — 80053 COMPREHEN METABOLIC PANEL: CPT | Performed by: EMERGENCY MEDICINE

## 2024-07-21 PROCEDURE — 96361 HYDRATE IV INFUSION ADD-ON: CPT

## 2024-07-21 PROCEDURE — 80061 LIPID PANEL: CPT | Performed by: EMERGENCY MEDICINE

## 2024-07-21 PROCEDURE — 72131 CT LUMBAR SPINE W/O DYE: CPT | Mod: TC

## 2024-07-21 PROCEDURE — 80307 DRUG TEST PRSMV CHEM ANLYZR: CPT | Performed by: EMERGENCY MEDICINE

## 2024-07-21 PROCEDURE — 63600175 PHARM REV CODE 636 W HCPCS: Mod: UD | Performed by: EMERGENCY MEDICINE

## 2024-07-21 PROCEDURE — 72125 CT NECK SPINE W/O DYE: CPT | Mod: TC

## 2024-07-21 PROCEDURE — 72125 CT NECK SPINE W/O DYE: CPT | Mod: 26,,, | Performed by: RADIOLOGY

## 2024-07-21 PROCEDURE — 94760 N-INVAS EAR/PLS OXIMETRY 1: CPT

## 2024-07-21 PROCEDURE — 84443 ASSAY THYROID STIM HORMONE: CPT | Performed by: EMERGENCY MEDICINE

## 2024-07-21 PROCEDURE — 70450 CT HEAD/BRAIN W/O DYE: CPT | Mod: TC

## 2024-07-21 PROCEDURE — 85025 COMPLETE CBC W/AUTO DIFF WBC: CPT | Performed by: EMERGENCY MEDICINE

## 2024-07-21 RX ORDER — BISACODYL 10 MG/1
10 SUPPOSITORY RECTAL DAILY PRN
Status: DISCONTINUED | OUTPATIENT
Start: 2024-07-21 | End: 2024-07-22 | Stop reason: HOSPADM

## 2024-07-21 RX ORDER — ONDANSETRON HYDROCHLORIDE 2 MG/ML
4 INJECTION, SOLUTION INTRAVENOUS EVERY 8 HOURS PRN
Status: DISCONTINUED | OUTPATIENT
Start: 2024-07-21 | End: 2024-07-22 | Stop reason: HOSPADM

## 2024-07-21 RX ORDER — TRAMADOL HYDROCHLORIDE 50 MG/1
50 TABLET ORAL
Status: COMPLETED | OUTPATIENT
Start: 2024-07-21 | End: 2024-07-21

## 2024-07-21 RX ORDER — MORPHINE SULFATE 2 MG/ML
2 INJECTION, SOLUTION INTRAMUSCULAR; INTRAVENOUS ONCE
Status: COMPLETED | OUTPATIENT
Start: 2024-07-21 | End: 2024-07-21

## 2024-07-21 RX ORDER — SODIUM CHLORIDE 0.9 % (FLUSH) 0.9 %
10 SYRINGE (ML) INJECTION
Status: DISCONTINUED | OUTPATIENT
Start: 2024-07-21 | End: 2024-07-22 | Stop reason: HOSPADM

## 2024-07-21 RX ORDER — PREGABALIN 100 MG/1
100 CAPSULE ORAL 3 TIMES DAILY
Status: DISCONTINUED | OUTPATIENT
Start: 2024-07-21 | End: 2024-07-22 | Stop reason: HOSPADM

## 2024-07-21 RX ORDER — LABETALOL HYDROCHLORIDE 5 MG/ML
10 INJECTION, SOLUTION INTRAVENOUS
Status: DISCONTINUED | OUTPATIENT
Start: 2024-07-21 | End: 2024-07-21

## 2024-07-21 RX ORDER — ASPIRIN 325 MG
325 TABLET ORAL
Status: COMPLETED | OUTPATIENT
Start: 2024-07-21 | End: 2024-07-21

## 2024-07-21 RX ORDER — ONDANSETRON HYDROCHLORIDE 2 MG/ML
INJECTION, SOLUTION INTRAVENOUS
Status: COMPLETED
Start: 2024-07-21 | End: 2024-07-21

## 2024-07-21 RX ORDER — QUETIAPINE FUMARATE 25 MG/1
100 TABLET, FILM COATED ORAL
Status: DISCONTINUED | OUTPATIENT
Start: 2024-07-21 | End: 2024-07-22 | Stop reason: HOSPADM

## 2024-07-21 RX ORDER — ASPIRIN 81 MG/1
81 TABLET ORAL DAILY
Status: DISCONTINUED | OUTPATIENT
Start: 2024-07-22 | End: 2024-07-22 | Stop reason: HOSPADM

## 2024-07-21 RX ORDER — ATORVASTATIN CALCIUM 40 MG/1
40 TABLET, FILM COATED ORAL DAILY
Status: DISCONTINUED | OUTPATIENT
Start: 2024-07-22 | End: 2024-07-22 | Stop reason: HOSPADM

## 2024-07-21 RX ORDER — ONDANSETRON HYDROCHLORIDE 2 MG/ML
4 INJECTION, SOLUTION INTRAVENOUS
Status: COMPLETED | OUTPATIENT
Start: 2024-07-21 | End: 2024-07-21

## 2024-07-21 RX ORDER — ENOXAPARIN SODIUM 100 MG/ML
40 INJECTION SUBCUTANEOUS EVERY 24 HOURS
Status: DISCONTINUED | OUTPATIENT
Start: 2024-07-21 | End: 2024-07-21

## 2024-07-21 RX ORDER — CITALOPRAM 10 MG/1
20 TABLET ORAL DAILY
Status: DISCONTINUED | OUTPATIENT
Start: 2024-07-22 | End: 2024-07-22 | Stop reason: HOSPADM

## 2024-07-21 RX ORDER — TRAMADOL HYDROCHLORIDE 50 MG/1
50 TABLET ORAL EVERY 12 HOURS PRN
Status: DISCONTINUED | OUTPATIENT
Start: 2024-07-21 | End: 2024-07-22 | Stop reason: HOSPADM

## 2024-07-21 RX ORDER — LABETALOL HYDROCHLORIDE 5 MG/ML
10 INJECTION, SOLUTION INTRAVENOUS ONCE
Status: COMPLETED | OUTPATIENT
Start: 2024-07-21 | End: 2024-07-21

## 2024-07-21 RX ORDER — QUETIAPINE FUMARATE 25 MG/1
100 TABLET, FILM COATED ORAL DAILY
Status: DISCONTINUED | OUTPATIENT
Start: 2024-07-22 | End: 2024-07-22 | Stop reason: HOSPADM

## 2024-07-21 RX ORDER — HYDRALAZINE HYDROCHLORIDE 20 MG/ML
10 INJECTION INTRAMUSCULAR; INTRAVENOUS EVERY 6 HOURS PRN
Status: DISCONTINUED | OUTPATIENT
Start: 2024-07-21 | End: 2024-07-22 | Stop reason: HOSPADM

## 2024-07-21 RX ORDER — POLYETHYLENE GLYCOL 3350 17 G/17G
17 POWDER, FOR SOLUTION ORAL DAILY
Status: DISCONTINUED | OUTPATIENT
Start: 2024-07-22 | End: 2024-07-22 | Stop reason: HOSPADM

## 2024-07-21 RX ADMIN — ASPIRIN 325 MG ORAL TABLET 325 MG: 325 PILL ORAL at 05:07

## 2024-07-21 RX ADMIN — TRAMADOL HYDROCHLORIDE 50 MG: 50 TABLET, COATED ORAL at 01:07

## 2024-07-21 RX ADMIN — ONDANSETRON 4 MG: 2 INJECTION INTRAMUSCULAR; INTRAVENOUS at 09:07

## 2024-07-21 RX ADMIN — ONDANSETRON 4 MG: 2 INJECTION INTRAMUSCULAR; INTRAVENOUS at 04:07

## 2024-07-21 RX ADMIN — ONDANSETRON HYDROCHLORIDE 4 MG: 2 INJECTION, SOLUTION INTRAVENOUS at 04:07

## 2024-07-21 RX ADMIN — LORAZEPAM 1 MG: 2 INJECTION INTRAMUSCULAR; INTRAVENOUS at 05:07

## 2024-07-21 RX ADMIN — LABETALOL HYDROCHLORIDE 10 MG: 5 INJECTION INTRAVENOUS at 12:07

## 2024-07-21 RX ADMIN — MORPHINE SULFATE 2 MG: 2 INJECTION, SOLUTION INTRAMUSCULAR; INTRAVENOUS at 09:07

## 2024-07-21 RX ADMIN — QUETIAPINE FUMARATE 100 MG: 25 TABLET ORAL at 09:07

## 2024-07-21 NOTE — RESPIRATORY THERAPY
07/21/24 1332   Patient Assessment/Suction   Level of Consciousness (AVPU) alert   Respiratory Effort Normal;Unlabored   Expansion/Accessory Muscles/Retractions no retractions;no use of accessory muscles   Rhythm/Pattern, Respiratory depth regular;pattern regular;unlabored   PRE-TX-O2   Device (Oxygen Therapy) room air   SpO2 97 %   Pulse Oximetry Type Continuous   $ Pulse Oximetry - Single Charge Pulse Oximetry - Single   Pulse 73   Resp 20

## 2024-07-21 NOTE — H&P
Hospitalist Admission History and Physical    CC:  Chief Complaint   Patient presents with    Fall     Patient reports trip and fall yesterday falling from standing to laying position.  Patient unable to stand after the fall due to weakness and chronic back pain.  Patient reports neck and upper back pain that is new since the fall.  History of incontinence of bowel and ladder, with numbess/tingling to lower legs since back surgery 11/2023 with no change post fall.         HPI:    Dinora Anderson is a 58 y.o. female with hypertension, cigarette smoking, bipolar disorder, schizophrenia, depression, physical debility due to back problems who presents to the emergency department complaining of a fall yesterday and being unable to get up, worsening lower extremity weakness, worsening bowel and bladder incontinence.  She says she was walking outside when her vision became blurry prompting her to fall.  She did not lose consciousness or hit her head.  She describes having a back surgery in November of last year because she could not walk.  She reports having issues with bladder incontinence at that time.  She says that now she is beginning to experience bowel incontinence as well.  She says she has not been able to walk well since that time.  She uses a rolling walker currently to ambulate.      CT head reveals possible hypodensity of the left frontal lobe possibly representing cerebral edema or infarct.  CT cervical spine reveals severe canal stenosis at C5-6.  Thoracic CT reveals prominent osteophytes T3-L1.  Lumbar CT significant for severe canal and foraminal stenosis L3-L4.    ROS  A 10 point review of systems was carried out and negative unless otherwise stated in the HPI.       PATIENT HISTORY:    Past Medical History:   Diagnosis Date    Bipolar 1 disorder     Bipolar 1 disorder     bipolar c schizophrenia and ptsd    Depression     Polysubstance abuse     PTSD (post-traumatic stress disorder)     Schizoaffective  disorder     Schizophrenia        Past Surgical History:   Procedure Laterality Date    TUBAL LIGATION         Social History     Socioeconomic History    Marital status: Single   Tobacco Use    Smoking status: Former    Smokeless tobacco: Former     Quit date: 8/16/2013   Substance and Sexual Activity    Alcohol use: No    Drug use: No    Sexual activity: Yes       No family history on file.    Current Outpatient Medications   Medication Instructions    citalopram (CELEXA) 20 mg, Oral, Daily    diclofenac (VOLTAREN) 75 mg, Oral, 2 times daily PRN    losartan (COZAAR) 25 mg, Oral, Daily    omeprazole (PRILOSEC) 40 mg, Oral, Daily    pregabalin (LYRICA) 100 mg, Oral, 3 times daily    QUEtiapine (SEROQUEL) 100 mg, Oral, Daily    traMADoL (ULTRAM) 50 mg, Oral, Every 12 hours PRN        Allergies:  Codeine and Pcn [penicillins]    Physical Exam   Temp:  [98 °F (36.7 °C)]   Pulse:  [72-94]   Resp:  [8-20]   BP: (142-234)/()   SpO2:  [95 %-97 %]     General:  Well-developed  female supine in bed, uncomfortable appearing  HEENT:  Atraumatic, PERRLA, moist mucosa  Neck: Trachea midline, no masses  Respiratory: CTAB, no audible W/R/R, non-labored breaths  Cardiovascular:  RRR, no peripheral edema or JVD appreciated   Abdomen:  Soft, NT/ND with BS x4  Musculoskeletal:  Normal passive ROM in upper and lower extremities  Integumentary:  Warm and dry with no rash, purpura, or petechia  Heme/Lymph:  No lymphadenopathy, no bruises  Rectal:  She does not have sensation to touch in the perianal area, she does have rectal tone though this feels weak  Neurological:  Awake and alert, CN II-XII grossly intact, difficulty lifting either lower extremity against gravity - more pronounced of the right, very weak dorsiflexion of right foot, she does have 2+ bilateral patellar reflexes; symmetrical upper extremity strength  Psychiatric:  Cooperative with normal mood, somewhat anxious    Labs    CBC:  Recent Labs   Lab  07/21/24  1216   WBC 6.82   HGB 15.8   HCT 47.5        CMP:  Recent Labs   Lab 07/21/24  1216   CALCIUM 9.2   ALBUMIN 3.6   PROT 7.9      K 3.9   CO2 19*      BUN 11   CREATININE 0.7   ALKPHOS 153*   ALT 59*   AST 50*   BILITOT 0.7         Imaging & Other Studies    Imaging Results              CT Cervical Spine Without Contrast (Final result)  Result time 07/21/24 17:41:49      Final result by Saray Carmona MD (07/21/24 17:41:49)                   Impression:      As above.      Electronically signed by: Saray Carmona  Date:    07/21/2024  Time:    17:41               Narrative:    EXAMINATION:  CT CERVICAL SPINE WITHOUT CONTRAST    CLINICAL HISTORY:  Neck pain, acute, no red flags;    TECHNIQUE:  Low dose axial images, sagittal and coronal reformations were performed though the cervical spine.  Contrast was not administered.    COMPARISON:  None    FINDINGS:  Osteopenia.  No acute fracture or listhesis.  Moderate mid to lower cervical spondylosis.  Severe central canal stenosis at C5-6.  Unremarkable prevertebral soft tissues.                                       CT Thoracic Spine Without Contrast (Final result)  Result time 07/21/24 14:10:51      Final result by Addison Brewster Jr., MD (07/21/24 14:10:51)                   Impression:      Prominent spurs and osteophytes anteriorly and to the right from T3-L1.  Otherwise negative thoracic spine CT.      Electronically signed by: Addison Brewster MD  Date:    07/21/2024  Time:    14:10               Narrative:    EXAMINATION:  CT THORACIC SPINE WITHOUT CONTRAST    CLINICAL HISTORY:  Mid-back pain;    TECHNIQUE:  Axial images are obtained through the thoracic spine and displayed at soft tissue and bone windows.  Sagittal and coronal reconstructions are provided.    COMPARISON:  None    FINDINGS:  The alignment is normal.  The vertebral bodies are intact without evidence of fracture or compression.  There are prominent anterior  spurs and osteophytes at every level from T3 downward to L1, primarily anteriorly and to the right.  The intervertebral discs are within normal limits.  Posterior spur formation is not seen.  Spinal canal or neural foraminal stenosis is not demonstrated on this study.    Fracture of the adjacent ribs in the back are not seen.                                       CT Lumbar Spine Without Contrast (Final result)  Result time 07/21/24 14:23:07      Final result by Addison Brewster Jr., MD (07/21/24 14:23:07)                   Impression:      Chronic degenerative disc disease and spondylosis at every level of the lower thoracic and lumbar spine.  Prior interbody fusion and posterior fixation at L5-S1.  Spinal canal or neural foraminal stenosis as described.  An acute fracture is not seen.      Electronically signed by: Addison Brewster MD  Date:    07/21/2024  Time:    14:23               Narrative:    EXAMINATION:  CT LUMBAR SPINE WITHOUT CONTRAST    CLINICAL HISTORY:  Low back pain, increased fracture risk;    TECHNIQUE:  Low-dose axial, sagittal and coronal reformations are obtained through the lumbar spine.  Contrast was not administered.    COMPARISON:  CT of the lumbar spine of February 22, 2017.    FINDINGS:  The alignment is normal.  The patient has undergone interbody fusion with a high metal device at the disc at L5-S1.  Posterior fixation with bilateral pedicle screws has been performed at L5 and S1.  There is partial laminectomy noted of S1.  There is disc space narrowing at every other level of the lumbar spine with spondylosis.  Prominent anterior osteophytes are noted at T12-L1 and L1-2.    At L1-2 there is disc protrusion and spur complex producing mild spinal canal and left lateral recess stenosis.    At L2-3 there is left neural foraminal stenosis.    At L3-4 there short pedicles hypertrophy and circumferential disc protrusion with spur formation producing moderate to severe spinal canal and  bilateral neural foraminal stenosis.    At L4-5 there is significant metal artifact obscuring the view but there is suspected bilateral neural foraminal stenosis.    Residual mild stenosis is noted at L5-S1.                                       CT Head Without Contrast (Final result)  Result time 07/21/24 14:06:53      Final result by Addison Brewster Jr., MD (07/21/24 14:06:53)                   Impression:      Possible hypodensity of the left frontal lobe may represent cerebral edema or infarct.  MRI of the brain is recommended.  No intracranial hemorrhage is noted.      Electronically signed by: Addison Brewster MD  Date:    07/21/2024  Time:    14:06               Narrative:    EXAMINATION:  CT HEAD WITHOUT CONTRAST    CLINICAL HISTORY:  Head trauma, intracranial arterial injury suspected;    TECHNIQUE:  Low dose axial images were obtained through the head.  Coronal and sagittal reformations were also performed. Contrast was not administered.    COMPARISON:  None.    FINDINGS:  No cranial fracture is identified.  Scalp edema or hematoma is not demonstrated.    The basal cisterns are clear and symmetric.  There is no mass effect or midline shift.  The ventricles are of normal size and symmetric.  The gray-white matter differentiation is normal.  There appears to be subtle hypodensity of the left frontal lobe and an acute infarct or cerebral edema is not ruled out.  No hemorrhage is seen.  The rest of the brain parenchyma appears of normal density without hyper or hypodense lesions consistent with tumor or hemorrhage seen.  No intracranial hemorrhage or hematoma is noted.                                        Patient Active Problem List   Diagnosis    Tobacco abuse    Bronchitis    Anxiety disorder    Bipolar 1 disorder, depressed, moderate    Gallbladder attack    Essential hypertension    Lumbar radiculopathy, chronic    Schizophrenia    Lower extremity weakness    Incontinence of bowel    Bladder  incontinence    Stroke-like symptom       I have independently reviewed the patient's medical record for this admission including all clinical notes, labs and imaging.  All pertinent findings and independent review of images are listed below:      Assessment and Plan     Multiple level severe spinal stenosis.  Her lumbar spinal issues are chronic to an extent.  The progressive nature of her weakness and development of bowel incontinence are concerning for cauda equina involvement.  We will attempt to obtain an MRI of her lumbar spine though she could not cooperate with the MRI brain ordered by ED physician.  I think it would be in her best interest to be evaluated by neurosurgery for any intervention if indicated.  Left frontal lobe lesion.  Hypodensity seen on CT suggesting MRI for better evaluation.  She was brought to MRI but unable to tolerate due to claustrophobia.  We will medicate her an attempt again.  Psychiatric disorders.  Bipolar and schizophrenic.  She is compliant with her medications which include Seroquel and Celexa.  Hypertension.  Continue home losartan when stroke is ruled out.  We will allow for permissive hypertension for now.  VTE prophylaxis.  SCDs.  Lovenox.

## 2024-07-21 NOTE — ED PROVIDER NOTES
Encounter Date: 7/21/2024       History     Chief Complaint   Patient presents with    Fall     Patient reports trip and fall yesterday falling from standing to laying position.  Patient unable to stand after the fall due to weakness and chronic back pain.  Patient reports neck and upper back pain that is new since the fall.  History of incontinence of bowel and ladder, with numbess/tingling to lower legs since back surgery 11/2023 with no change post fall.     58-year-old female past history of hypertension, anxiety, depression, bipolar, chronic low back pain lives with brother but he is currently working offshore and he has been gone for several weeks.  Patient with fall yesterday while out on her front porch called back into the house and has been lying on the floor since yesterday.  Complains of progressively worsening low back pain over the last few months and difficulty with ambulating.  No discrete focal new weakness or numbness but states she has chronic right lower foot pain and numbness related to her back surgery which she had in November of last year.  She reports generalized weakness all over.  No new or worsening incontinence or saddle anesthesia.  No fever.  No chest pain or shortness of breath.  No abdominal pain.  Pain is worse with twisting, bending and movement and relieved by rest.      Review of patient's allergies indicates:   Allergen Reactions    Codeine      Rash and itch    Pcn [penicillins] Swelling     swelling     Past Medical History:   Diagnosis Date    Bipolar 1 disorder     Bipolar 1 disorder     bipolar c schizophrenia and ptsd    Depression     Polysubstance abuse     PTSD (post-traumatic stress disorder)     Schizoaffective disorder     Schizophrenia      Past Surgical History:   Procedure Laterality Date    TUBAL LIGATION       No family history on file.  Social History     Tobacco Use    Smoking status: Former    Smokeless tobacco: Former     Quit date: 8/16/2013   Substance Use  Topics    Alcohol use: No    Drug use: No     Review of Systems   Constitutional:  Positive for fatigue. Negative for fever.   HENT:  Negative for sore throat.    Respiratory:  Negative for shortness of breath.    Cardiovascular:  Negative for chest pain.   Gastrointestinal:  Negative for nausea.   Genitourinary:  Negative for dysuria.   Musculoskeletal:  Positive for back pain.   Skin:  Negative for rash.   Neurological:  Negative for weakness.   Hematological:  Does not bruise/bleed easily.   All other systems reviewed and are negative.      Physical Exam     Initial Vitals [07/21/24 1205]   BP Pulse Resp Temp SpO2   (!) 234/105 76 17 98 °F (36.7 °C) 95 %      MAP       --         Physical Exam    Nursing note and vitals reviewed.  Constitutional: She appears well-developed and well-nourished.   HENT:   Head: Normocephalic and atraumatic.   Eyes: EOM are normal. Pupils are equal, round, and reactive to light.   Neck: Neck supple.   Normal range of motion.  Cardiovascular:  Regular rhythm and normal heart sounds.           Tachycardia, regular rhythm no murmur   Pulmonary/Chest: Breath sounds normal. No respiratory distress.   Abdominal: Abdomen is soft. Bowel sounds are normal.   Musculoskeletal:      Cervical back: Normal range of motion and neck supple.      Comments: Diffuse cervical, thoracic and lumbar spine tenderness.  No step-off.     Neurological: She is alert and oriented to person, place, and time. She has normal strength. A sensory deficit is present. No cranial nerve deficit.   Subjective decreased sensation of the right foot chronic per patient.  Motor 5/5 bilateral lower extremities with slight limited due to pain.  Upper extremities 5/5 motor and sensation intact   Skin: Skin is warm and dry.   Psychiatric: She has a normal mood and affect. Her behavior is normal. Thought content normal.   Tearful, no HI SI or hallucination         ED Course   Procedures  Labs Reviewed   COMPREHENSIVE METABOLIC  PANEL - Abnormal       Result Value    Sodium 138      Potassium 3.9      Chloride 106      CO2 19 (*)     Glucose 149 (*)     BUN 11      Creatinine 0.7      Calcium 9.2      Total Protein 7.9      Albumin 3.6      Total Bilirubin 0.7      Alkaline Phosphatase 153 (*)     AST 50 (*)     ALT 59 (*)     eGFR >60.0      Anion Gap 13     URINALYSIS - Abnormal    Specimen UA Urine, Unspecified      Color, UA Yellow      Appearance, UA Hazy (*)     pH, UA 6.0      Specific Gravity, UA 1.025      Protein, UA 2+ (*)     Glucose, UA Negative      Ketones, UA Negative      Bilirubin (UA) Negative      Occult Blood UA Negative      Nitrite, UA Negative      Urobilinogen, UA Negative      Leukocytes, UA Negative      Narrative:     Collection Type->Urine, Clean Catch  Specimen Source->Urine   DRUG SCREEN PANEL, URINE EMERGENCY - Abnormal    Benzodiazepines Negative      Methadone metabolites Negative      Cocaine (Metab.) Negative      Opiate Scrn, Ur Negative      Barbiturate Screen, Ur Negative      Amphetamine Screen, Ur Negative      THC Presumptive Positive (*)     Phencyclidine Negative      Creatinine, Urine 148.9      Toxicology Information SEE COMMENT      Narrative:     Collection Type->Urine, Clean Catch  Specimen Source->Urine   URINALYSIS MICROSCOPIC - Abnormal    RBC, UA 0      WBC, UA 2      Bacteria Moderate (*)     Squam Epithel, UA 30      Hyaline Casts, UA 0      Microscopic Comment SEE COMMENT      Narrative:     Collection Type->Urine, Clean Catch  Specimen Source->Urine   CBC W/ AUTO DIFFERENTIAL    WBC 6.82      RBC 5.37      Hemoglobin 15.8      Hematocrit 47.5      MCV 89      MCH 29.4      MCHC 33.3      RDW 14.5      Platelets 154      MPV 10.5      Immature Granulocytes 0.4      Gran # (ANC) 4.9      Immature Grans (Abs) 0.03      Lymph # 1.4      Mono # 0.4      Eos # 0.0      Baso # 0.04      nRBC 0      Gran % 71.2      Lymph % 21.1      Mono % 6.3      Eosinophil % 0.4      Basophil % 0.6       Differential Method Automated     ALCOHOL,MEDICAL (ETHANOL)    Alcohol, Serum <10     CK   TROPONIN I   TROPONIN I    Troponin I <0.006     CK    CPK 57       EKG Readings: (Independently Interpreted)   1256; normal sinus rhythm 65 beats per minute normal axis, intervals otherwise normal-appearing EKG.       Imaging Results              CT Cervical Spine Without Contrast (Final result)  Result time 07/21/24 17:41:49      Final result by Saray Carmona MD (07/21/24 17:41:49)                   Impression:      As above.      Electronically signed by: Saray Carmona  Date:    07/21/2024  Time:    17:41               Narrative:    EXAMINATION:  CT CERVICAL SPINE WITHOUT CONTRAST    CLINICAL HISTORY:  Neck pain, acute, no red flags;    TECHNIQUE:  Low dose axial images, sagittal and coronal reformations were performed though the cervical spine.  Contrast was not administered.    COMPARISON:  None    FINDINGS:  Osteopenia.  No acute fracture or listhesis.  Moderate mid to lower cervical spondylosis.  Severe central canal stenosis at C5-6.  Unremarkable prevertebral soft tissues.                                       MRI Brain Without Contrast (In process)                      CT Thoracic Spine Without Contrast (Final result)  Result time 07/21/24 14:10:51      Final result by Addison Brewster Jr., MD (07/21/24 14:10:51)                   Impression:      Prominent spurs and osteophytes anteriorly and to the right from T3-L1.  Otherwise negative thoracic spine CT.      Electronically signed by: Addison Brewster MD  Date:    07/21/2024  Time:    14:10               Narrative:    EXAMINATION:  CT THORACIC SPINE WITHOUT CONTRAST    CLINICAL HISTORY:  Mid-back pain;    TECHNIQUE:  Axial images are obtained through the thoracic spine and displayed at soft tissue and bone windows.  Sagittal and coronal reconstructions are provided.    COMPARISON:  None    FINDINGS:  The alignment is normal.  The vertebral bodies  are intact without evidence of fracture or compression.  There are prominent anterior spurs and osteophytes at every level from T3 downward to L1, primarily anteriorly and to the right.  The intervertebral discs are within normal limits.  Posterior spur formation is not seen.  Spinal canal or neural foraminal stenosis is not demonstrated on this study.    Fracture of the adjacent ribs in the back are not seen.                                       CT Lumbar Spine Without Contrast (Final result)  Result time 07/21/24 14:23:07      Final result by Addison Brewster Jr., MD (07/21/24 14:23:07)                   Impression:      Chronic degenerative disc disease and spondylosis at every level of the lower thoracic and lumbar spine.  Prior interbody fusion and posterior fixation at L5-S1.  Spinal canal or neural foraminal stenosis as described.  An acute fracture is not seen.      Electronically signed by: Addison Brewster MD  Date:    07/21/2024  Time:    14:23               Narrative:    EXAMINATION:  CT LUMBAR SPINE WITHOUT CONTRAST    CLINICAL HISTORY:  Low back pain, increased fracture risk;    TECHNIQUE:  Low-dose axial, sagittal and coronal reformations are obtained through the lumbar spine.  Contrast was not administered.    COMPARISON:  CT of the lumbar spine of February 22, 2017.    FINDINGS:  The alignment is normal.  The patient has undergone interbody fusion with a high metal device at the disc at L5-S1.  Posterior fixation with bilateral pedicle screws has been performed at L5 and S1.  There is partial laminectomy noted of S1.  There is disc space narrowing at every other level of the lumbar spine with spondylosis.  Prominent anterior osteophytes are noted at T12-L1 and L1-2.    At L1-2 there is disc protrusion and spur complex producing mild spinal canal and left lateral recess stenosis.    At L2-3 there is left neural foraminal stenosis.    At L3-4 there short pedicles hypertrophy and circumferential  disc protrusion with spur formation producing moderate to severe spinal canal and bilateral neural foraminal stenosis.    At L4-5 there is significant metal artifact obscuring the view but there is suspected bilateral neural foraminal stenosis.    Residual mild stenosis is noted at L5-S1.                                       CT Head Without Contrast (Final result)  Result time 07/21/24 14:06:53      Final result by Addison Brewster Jr., MD (07/21/24 14:06:53)                   Impression:      Possible hypodensity of the left frontal lobe may represent cerebral edema or infarct.  MRI of the brain is recommended.  No intracranial hemorrhage is noted.      Electronically signed by: Addison Brewster MD  Date:    07/21/2024  Time:    14:06               Narrative:    EXAMINATION:  CT HEAD WITHOUT CONTRAST    CLINICAL HISTORY:  Head trauma, intracranial arterial injury suspected;    TECHNIQUE:  Low dose axial images were obtained through the head.  Coronal and sagittal reformations were also performed. Contrast was not administered.    COMPARISON:  None.    FINDINGS:  No cranial fracture is identified.  Scalp edema or hematoma is not demonstrated.    The basal cisterns are clear and symmetric.  There is no mass effect or midline shift.  The ventricles are of normal size and symmetric.  The gray-white matter differentiation is normal.  There appears to be subtle hypodensity of the left frontal lobe and an acute infarct or cerebral edema is not ruled out.  No hemorrhage is seen.  The rest of the brain parenchyma appears of normal density without hyper or hypodense lesions consistent with tumor or hemorrhage seen.  No intracranial hemorrhage or hematoma is noted.                                       Medications   labetaloL injection 10 mg (0 mg Intravenous Hold 7/21/24 1615)   aspirin tablet 325 mg (has no administration in time range)   sodium chloride 0.9% bolus 1,000 mL 1,000 mL (1,000 mLs Intravenous Bolus from Bag  7/21/24 1219)   labetaloL injection 10 mg (10 mg Intravenous Given 7/21/24 1219)   traMADoL tablet 50 mg (50 mg Oral Given 7/21/24 1353)   ondansetron injection 4 mg (4 mg Intravenous Given 7/21/24 1612)   LORazepam (ATIVAN) injection 1 mg (1 mg Intravenous Given 7/21/24 1730)     Medical Decision Making  Differential diagnosis; dehydration, acute on chronic pain progressive debility, occult infection/sepsis, anemia, electrolyte abnormality, ACS, spine fracture    Patient well-appearing.  Follow up labs, imaging, reassess.    Patient's labs reviewed and largely unremarkable.  Hypertension improved.  Head CT shows a hypodensity in the left frontal lobe which may be an subacute infarct.  She reports progressively worsening generalized weakness and falls over the last few weeks but worse in the last week.  Symptoms and ongoing for at least a couple of days based on her history.  Out of any potential windows.  Thoracic and lumbar spine CTs negative for fracture.  CT cervical spine negative.  Patient could not tolerate MRI.  Aspirin given.  Plan for admission to the hospital for further treatment and evaluation given patient's falls abnormal head CT.  Based on her presentation her symptoms been ongoing at least several days likely a week.  Out of any potential windows    Patient could not tolerate MRI.  Cervical spine CT pending    Amount and/or Complexity of Data Reviewed  Labs: ordered.  Radiology: ordered.    Risk  OTC drugs.  Prescription drug management.                                      Clinical Impression:  Final diagnoses:  [R42] Dizziness  [W19.XXXA] Fall, initial encounter (Primary)  [R53.1] Weakness          ED Disposition Condition    Observation Stable                Kt Gomez MD  07/21/24 4415

## 2024-07-22 ENCOUNTER — HOSPITAL ENCOUNTER (INPATIENT)
Facility: HOSPITAL | Age: 59
LOS: 7 days | Discharge: NURSING, REHAB OR LONG-TERM CARE | End: 2024-07-29
Attending: STUDENT IN AN ORGANIZED HEALTH CARE EDUCATION/TRAINING PROGRAM | Admitting: STUDENT IN AN ORGANIZED HEALTH CARE EDUCATION/TRAINING PROGRAM
Payer: MEDICAID

## 2024-07-22 VITALS
RESPIRATION RATE: 16 BRPM | DIASTOLIC BLOOD PRESSURE: 62 MMHG | BODY MASS INDEX: 40.72 KG/M2 | SYSTOLIC BLOOD PRESSURE: 132 MMHG | HEART RATE: 75 BPM | TEMPERATURE: 98 F | OXYGEN SATURATION: 94 % | WEIGHT: 260 LBS

## 2024-07-22 DIAGNOSIS — M48.00 CENTRAL STENOSIS OF SPINAL CANAL: Primary | ICD-10-CM

## 2024-07-22 DIAGNOSIS — M54.9 ACUTE BACK PAIN: ICD-10-CM

## 2024-07-22 DIAGNOSIS — M54.16 LUMBAR RADICULOPATHY, CHRONIC: ICD-10-CM

## 2024-07-22 DIAGNOSIS — F31.32 BIPOLAR 1 DISORDER, DEPRESSED, MODERATE: ICD-10-CM

## 2024-07-22 DIAGNOSIS — R07.9 CHEST PAIN: ICD-10-CM

## 2024-07-22 PROBLEM — R52 PAIN: Status: ACTIVE | Noted: 2024-07-22

## 2024-07-22 PROBLEM — F19.20 DRUG DEPENDENCE: Status: ACTIVE | Noted: 2024-07-22

## 2024-07-22 PROBLEM — R20.0 SENSORY LOSS: Status: ACTIVE | Noted: 2024-07-22

## 2024-07-22 PROBLEM — G93.9 BRAIN LESION: Status: ACTIVE | Noted: 2024-07-22
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
## 2024-07-22 PROBLEM — F17.200 NICOTINE DEPENDENCE: Status: ACTIVE | Noted: 2024-07-22

## 2024-07-22 PROBLEM — E66.01 SEVERE OBESITY (BMI >= 40): Status: ACTIVE | Noted: 2024-07-22

## 2024-07-22 LAB
ALBUMIN SERPL BCP-MCNC: 3.1 G/DL (ref 3.5–5.2)
ALP SERPL-CCNC: 132 U/L (ref 55–135)
ALT SERPL W/O P-5'-P-CCNC: 84 U/L (ref 10–44)
ANION GAP SERPL CALC-SCNC: 6 MMOL/L (ref 8–16)
AST SERPL-CCNC: 97 U/L (ref 10–40)
BASOPHILS # BLD AUTO: 0.02 K/UL (ref 0–0.2)
BASOPHILS NFR BLD: 0.4 % (ref 0–1.9)
BILIRUB SERPL-MCNC: 0.6 MG/DL (ref 0.1–1)
BUN SERPL-MCNC: 9 MG/DL (ref 6–20)
CALCIUM SERPL-MCNC: 8.7 MG/DL (ref 8.7–10.5)
CHLORIDE SERPL-SCNC: 107 MMOL/L (ref 95–110)
CO2 SERPL-SCNC: 23 MMOL/L (ref 23–29)
CREAT SERPL-MCNC: 0.7 MG/DL (ref 0.5–1.4)
DIFFERENTIAL METHOD BLD: ABNORMAL
EOSINOPHIL # BLD AUTO: 0 K/UL (ref 0–0.5)
EOSINOPHIL NFR BLD: 0.4 % (ref 0–8)
ERYTHROCYTE [DISTWIDTH] IN BLOOD BY AUTOMATED COUNT: 14.6 % (ref 11.5–14.5)
EST. GFR  (NO RACE VARIABLE): >60 ML/MIN/1.73 M^2
GLUCOSE SERPL-MCNC: 140 MG/DL (ref 70–110)
HCT VFR BLD AUTO: 45.1 % (ref 37–48.5)
HGB BLD-MCNC: 14.3 G/DL (ref 12–16)
IMM GRANULOCYTES # BLD AUTO: 0.03 K/UL (ref 0–0.04)
IMM GRANULOCYTES NFR BLD AUTO: 0.5 % (ref 0–0.5)
LYMPHOCYTES # BLD AUTO: 1.3 K/UL (ref 1–4.8)
LYMPHOCYTES NFR BLD: 23.5 % (ref 18–48)
MCH RBC QN AUTO: 29.4 PG (ref 27–31)
MCHC RBC AUTO-ENTMCNC: 31.7 G/DL (ref 32–36)
MCV RBC AUTO: 93 FL (ref 82–98)
MONOCYTES # BLD AUTO: 0.4 K/UL (ref 0.3–1)
MONOCYTES NFR BLD: 6.4 % (ref 4–15)
NEUTROPHILS # BLD AUTO: 3.8 K/UL (ref 1.8–7.7)
NEUTROPHILS NFR BLD: 68.8 % (ref 38–73)
NRBC BLD-RTO: 0 /100 WBC
OHS QRS DURATION: 94 MS
OHS QTC CALCULATION: 422 MS
PLATELET # BLD AUTO: 150 K/UL (ref 150–450)
PMV BLD AUTO: 10.7 FL (ref 9.2–12.9)
POTASSIUM SERPL-SCNC: 4.3 MMOL/L (ref 3.5–5.1)
PROT SERPL-MCNC: 6.8 G/DL (ref 6–8.4)
RBC # BLD AUTO: 4.87 M/UL (ref 4–5.4)
SODIUM SERPL-SCNC: 136 MMOL/L (ref 136–145)
WBC # BLD AUTO: 5.5 K/UL (ref 3.9–12.7)

## 2024-07-22 PROCEDURE — 63600175 PHARM REV CODE 636 W HCPCS: Mod: UD | Performed by: HOSPITALIST

## 2024-07-22 PROCEDURE — 36415 COLL VENOUS BLD VENIPUNCTURE: CPT | Performed by: HOSPITALIST

## 2024-07-22 PROCEDURE — 25000003 PHARM REV CODE 250: Performed by: STUDENT IN AN ORGANIZED HEALTH CARE EDUCATION/TRAINING PROGRAM

## 2024-07-22 PROCEDURE — 80053 COMPREHEN METABOLIC PANEL: CPT | Performed by: HOSPITALIST

## 2024-07-22 PROCEDURE — 25000003 PHARM REV CODE 250: Performed by: HOSPITALIST

## 2024-07-22 PROCEDURE — 85025 COMPLETE CBC W/AUTO DIFF WBC: CPT | Performed by: HOSPITALIST

## 2024-07-22 PROCEDURE — 11000001 HC ACUTE MED/SURG PRIVATE ROOM

## 2024-07-22 PROCEDURE — 99254 IP/OBS CNSLTJ NEW/EST MOD 60: CPT | Mod: ,,, | Performed by: NEUROLOGICAL SURGERY

## 2024-07-22 RX ORDER — CITALOPRAM 20 MG/1
20 TABLET, FILM COATED ORAL DAILY
Status: DISCONTINUED | OUTPATIENT
Start: 2024-07-22 | End: 2024-07-29 | Stop reason: HOSPADM

## 2024-07-22 RX ORDER — ONDANSETRON HYDROCHLORIDE 2 MG/ML
4 INJECTION, SOLUTION INTRAVENOUS EVERY 6 HOURS PRN
Status: DISCONTINUED | OUTPATIENT
Start: 2024-07-22 | End: 2024-07-29 | Stop reason: HOSPADM

## 2024-07-22 RX ORDER — MORPHINE SULFATE 2 MG/ML
2 INJECTION, SOLUTION INTRAMUSCULAR; INTRAVENOUS EVERY 4 HOURS PRN
Status: DISCONTINUED | OUTPATIENT
Start: 2024-07-22 | End: 2024-07-29 | Stop reason: HOSPADM

## 2024-07-22 RX ORDER — ACETAMINOPHEN 325 MG/1
650 TABLET ORAL EVERY 6 HOURS PRN
Status: DISCONTINUED | OUTPATIENT
Start: 2024-07-22 | End: 2024-07-29 | Stop reason: HOSPADM

## 2024-07-22 RX ORDER — PANTOPRAZOLE SODIUM 40 MG/1
40 TABLET, DELAYED RELEASE ORAL DAILY
Status: DISCONTINUED | OUTPATIENT
Start: 2024-07-22 | End: 2024-07-29 | Stop reason: HOSPADM

## 2024-07-22 RX ORDER — IPRATROPIUM BROMIDE AND ALBUTEROL SULFATE 2.5; .5 MG/3ML; MG/3ML
3 SOLUTION RESPIRATORY (INHALATION) EVERY 4 HOURS PRN
Status: DISCONTINUED | OUTPATIENT
Start: 2024-07-22 | End: 2024-07-29 | Stop reason: HOSPADM

## 2024-07-22 RX ORDER — GLUCAGON 1 MG
1 KIT INJECTION
Status: DISCONTINUED | OUTPATIENT
Start: 2024-07-22 | End: 2024-07-29 | Stop reason: HOSPADM

## 2024-07-22 RX ORDER — QUETIAPINE FUMARATE 25 MG/1
100 TABLET, FILM COATED ORAL NIGHTLY
Status: DISCONTINUED | OUTPATIENT
Start: 2024-07-22 | End: 2024-07-29 | Stop reason: HOSPADM

## 2024-07-22 RX ORDER — AMOXICILLIN 250 MG
2 CAPSULE ORAL 2 TIMES DAILY PRN
Status: DISCONTINUED | OUTPATIENT
Start: 2024-07-22 | End: 2024-07-29 | Stop reason: HOSPADM

## 2024-07-22 RX ORDER — SODIUM CHLORIDE 9 MG/ML
INJECTION, SOLUTION INTRAVENOUS CONTINUOUS
Status: DISCONTINUED | OUTPATIENT
Start: 2024-07-22 | End: 2024-07-27

## 2024-07-22 RX ORDER — IBUPROFEN 200 MG
24 TABLET ORAL
Status: DISCONTINUED | OUTPATIENT
Start: 2024-07-22 | End: 2024-07-29 | Stop reason: HOSPADM

## 2024-07-22 RX ORDER — DIAZEPAM 10 MG/2ML
2.5 INJECTION INTRAMUSCULAR
Status: DISCONTINUED | OUTPATIENT
Start: 2024-07-22 | End: 2024-07-27

## 2024-07-22 RX ORDER — PREGABALIN 50 MG/1
100 CAPSULE ORAL 3 TIMES DAILY
Status: DISCONTINUED | OUTPATIENT
Start: 2024-07-22 | End: 2024-07-29 | Stop reason: HOSPADM

## 2024-07-22 RX ORDER — LOSARTAN POTASSIUM 25 MG/1
25 TABLET ORAL DAILY
Status: DISCONTINUED | OUTPATIENT
Start: 2024-07-22 | End: 2024-07-29 | Stop reason: HOSPADM

## 2024-07-22 RX ORDER — ALUMINUM HYDROXIDE, MAGNESIUM HYDROXIDE, AND SIMETHICONE 1200; 120; 1200 MG/30ML; MG/30ML; MG/30ML
30 SUSPENSION ORAL
Status: DISCONTINUED | OUTPATIENT
Start: 2024-07-22 | End: 2024-07-29

## 2024-07-22 RX ORDER — TRAMADOL HYDROCHLORIDE 50 MG/1
50 TABLET ORAL EVERY 12 HOURS PRN
Status: DISCONTINUED | OUTPATIENT
Start: 2024-07-22 | End: 2024-07-29 | Stop reason: HOSPADM

## 2024-07-22 RX ORDER — NALOXONE HCL 0.4 MG/ML
0.02 VIAL (ML) INJECTION
Status: DISCONTINUED | OUTPATIENT
Start: 2024-07-22 | End: 2024-07-29 | Stop reason: HOSPADM

## 2024-07-22 RX ORDER — IBUPROFEN 200 MG
16 TABLET ORAL
Status: DISCONTINUED | OUTPATIENT
Start: 2024-07-22 | End: 2024-07-29 | Stop reason: HOSPADM

## 2024-07-22 RX ORDER — POLYETHYLENE GLYCOL 3350 17 G/17G
17 POWDER, FOR SOLUTION ORAL DAILY
Status: DISCONTINUED | OUTPATIENT
Start: 2024-07-22 | End: 2024-07-29 | Stop reason: HOSPADM

## 2024-07-22 RX ORDER — SUCRALFATE 1 G/10ML
1 SUSPENSION ORAL EVERY 6 HOURS
Status: DISCONTINUED | OUTPATIENT
Start: 2024-07-22 | End: 2024-07-29 | Stop reason: HOSPADM

## 2024-07-22 RX ORDER — SODIUM CHLORIDE 0.9 % (FLUSH) 0.9 %
10 SYRINGE (ML) INJECTION EVERY 12 HOURS PRN
Status: DISCONTINUED | OUTPATIENT
Start: 2024-07-22 | End: 2024-07-29 | Stop reason: HOSPADM

## 2024-07-22 RX ORDER — TALC
6 POWDER (GRAM) TOPICAL NIGHTLY PRN
Status: DISCONTINUED | OUTPATIENT
Start: 2024-07-22 | End: 2024-07-29 | Stop reason: HOSPADM

## 2024-07-22 RX ADMIN — QUETIAPINE FUMARATE 100 MG: 25 TABLET ORAL at 08:07

## 2024-07-22 RX ADMIN — MORPHINE SULFATE 2 MG: 2 INJECTION, SOLUTION INTRAMUSCULAR; INTRAVENOUS at 08:07

## 2024-07-22 RX ADMIN — SODIUM CHLORIDE: 9 INJECTION, SOLUTION INTRAVENOUS at 08:07

## 2024-07-22 RX ADMIN — ONDANSETRON 4 MG: 2 INJECTION INTRAMUSCULAR; INTRAVENOUS at 08:07

## 2024-07-22 RX ADMIN — ALUMINUM HYDROXIDE, MAGNESIUM HYDROXIDE, AND SIMETHICONE 30 ML: 1200; 120; 1200 SUSPENSION ORAL at 08:07

## 2024-07-22 RX ADMIN — TRAMADOL HYDROCHLORIDE 50 MG: 50 TABLET, COATED ORAL at 05:07

## 2024-07-22 RX ADMIN — ALUMINUM HYDROXIDE, MAGNESIUM HYDROXIDE, AND SIMETHICONE 30 ML: 1200; 120; 1200 SUSPENSION ORAL at 04:07

## 2024-07-22 RX ADMIN — PANTOPRAZOLE SODIUM 40 MG: 40 TABLET, DELAYED RELEASE ORAL at 08:07

## 2024-07-22 RX ADMIN — PREGABALIN 100 MG: 50 CAPSULE ORAL at 08:07

## 2024-07-22 RX ADMIN — TRAMADOL HYDROCHLORIDE 50 MG: 50 TABLET, COATED ORAL at 01:07

## 2024-07-22 RX ADMIN — SUCRALFATE 1 G: 1 SUSPENSION ORAL at 12:07

## 2024-07-22 RX ADMIN — ONDANSETRON 4 MG: 2 INJECTION INTRAMUSCULAR; INTRAVENOUS at 07:07

## 2024-07-22 RX ADMIN — ACETAMINOPHEN 650 MG: 325 TABLET ORAL at 06:07

## 2024-07-22 RX ADMIN — CITALOPRAM HYDROBROMIDE 20 MG: 20 TABLET ORAL at 08:07

## 2024-07-22 RX ADMIN — LOSARTAN POTASSIUM 25 MG: 25 TABLET, FILM COATED ORAL at 08:07

## 2024-07-22 RX ADMIN — ALUMINUM HYDROXIDE, MAGNESIUM HYDROXIDE, AND SIMETHICONE 30 ML: 1200; 120; 1200 SUSPENSION ORAL at 11:07

## 2024-07-22 RX ADMIN — MORPHINE SULFATE 2 MG: 2 INJECTION, SOLUTION INTRAMUSCULAR; INTRAVENOUS at 11:07

## 2024-07-22 RX ADMIN — MICONAZOLE NITRATE: 20 OINTMENT TOPICAL at 08:07

## 2024-07-22 NOTE — PROVIDER TRANSFER
Outside Transfer Acceptance Note / Regional Referral Center    Referring facility: [facility]   Referring provider: Dr Medrano  Accepting facility: [facility]  Accepting provider: Letitia Becerra MD  Admitting provider: Letitia Becerra MD  Reason for transfer:  HLOC  Transfer diagnosis: acute on chronic back pain   Transfer specialty requested: Neurosurgery  Transfer specialty notified: Yes  Transfer level: NUMBER 1-5: 2  Bed type requested: med surg  Isolation status: No active isolations   Admission class or status: IP- Inpatient    Narrative     59yo F with HTN, obesity, tobacco use d/o, bipolar d/o, schizophrenia, and chronic back pain s/p back surgery in 11/2023 who presented to the [place] ED after falling at home, unable to get up. Vision blurry prior  to the fall. She uses a rolling walker at baseline due to back pain, however after the fall she reported worsening LE weakness, as well as worsening bowel and bladder incontinence. Seems like these are not new symptoms, rather worsening since this fall.     Afebrile and HDS. Intermittently hypertensive. SpO2 dropped to low 90s while sleeping, so she was placed on 2L NC. Labs with normal CBC, CMP with HCO3 19, BG 140s, and mildly increased LFTs, otherwise normal. A1c 5.8%, TSH normal, EtOH level low, UDS only positive for THC.    CT spine CTL with multi-level severe spinal stenosis. CT head reveals possible hypodensity of the left frontal lobe possibly representing cerebral edema or infarct.      Admitted to [facility] medicine. MRI brain was attempted but she was unable to cooperate due to claustrophobia, despite getting ativan. MRI L-spine not yet attempted due to inability to toerate MRI brain. Referring facility physician spoke to on-call for NSGY, Dr Holly, who recommded pt be transferred to [facility] for [facility] spine. If MRI is negative for cauda equina, plan to transfer pt back to [place].    Objective     Vitals: Temp:  98 °F (36.7 °C) (07/21/24 1205)  Pulse: 82 (07/22/24 0017)  Resp: 16 (07/21/24 2130)  BP: (!) 107/54 (07/22/24 0017)  SpO2: 96 % (07/22/24 0017)  Recent Labs: All pertinent labs within the past 24 hours have been reviewed.  Recent imaging: as above   Airway: no airway concerns  Vent settings:  n/a     IV access:        Peripheral IV - Single Lumen 07/21/24 1212 20 G Right Hand (Active)   Site Assessment Clean;Dry;Intact;No redness;No swelling 07/21/24 1212   Extremity Assessment Distal to IV No warmth;No swelling;No redness;No abnormal discoloration 07/21/24 1212   Line Status Blood return noted;Flushed;Saline locked 07/21/24 1212   Dressing Status Clean;Dry;Intact 07/21/24 1212   Dressing Intervention First dressing 07/21/24 1212   Dressing Change Due 07/25/24 07/21/24 1212   Site Change Due 07/25/24 07/21/24 1212   Reason Not Rotated Not due 07/21/24 1212     Infusions: none  Allergies:   Review of patient's allergies indicates:   Allergen Reactions    Codeine      Rash and itch    Pcn [penicillins] Swelling     swelling      NPO: Yes    Anticoagulation:   Anticoagulants       None             Instructions      Domenic Woods-  Admit to Hospital Medicine  Upon patient arrival to floor, please send SecureChat to [facility] or call extension [phone] (if no answer, do NOT leave a callback number after the beep, rather please send a SecureChat to [facility]), for Hospital Medicine admit team assignment and for additional admit orders for the patient.  Do not page the attending physician associated with the patient on arrival (this physician may not be on duty at the time of arrival).  Rather, always send a SecureChat to [facility] or call [phone] to reach the triage physician for orders and team assignment.

## 2024-07-22 NOTE — ASSESSMENT & PLAN NOTE
This is a 58-year-old female with a past medical history of L5-S1 right-sided radiculopathy status post L5-S1 fusion with Dr. Davian Orourke and larisa he [place] on November 20, 2024 the presents with persistent subjective weakness and recurrent falls, as well as urinary and bowel urgency.  CT of the lumbar spine demonstrates aforementioned hardware in satisfactory position.  A CT of her head does demonstrate an area of hypoattenuation in the left frontal lobe, likely representing vasogenic edema.    --Admitted  with q4h neurochecks.  --All labs and diagnostics reviewed.  --MRI Brain, Lsp.  --XR Lsp flex/ex  --Monitor for urinary retention.   --Please page with exam change or questions.    Dispo: ongoing.    Plan d/w Dr. Cook.

## 2024-07-22 NOTE — CONSULTS
[facility] - Neurosurgery ([facility])  Neurosurgery  Consult Note    Inpatient consult to Neurosurgery  Consult performed by: Naren Lux MD  Consult ordered by: Harry Richardson DO        Subjective:     Chief Complaint/Reason for Admission: Weakness    History of Present Illness: The patient is a very pleasant 58-year-old female with a past medical history of L5-S1 radiculopathy on the right side status post L5-S1 fusion on November 20, 2024 with Dr. Davian Orourke, who presents after a mechanical ground level fall on Saturday.  Patient reports that she fell on Saturday when her legs gave out, and fell on her right side.  She denies back or neck or head trauma.  She denies loss of consciousness.  She states that she was down for almost 24 hours before EMS arrived to take her to the hospital.  Currently, she states that she has shooting pain that travels down the right lower extremity along the right lateral thigh and leg and the plantar aspect of the foot which occurs when she gets muscle spasms.  She also has numbness and tingling in that same distribution.  She also reports bowel and bladder urgency since her surgery.  She denies any episodes of urinating or stooling on herself without realizing it.  She denies headache, vision/hearing changes, dysphagia/dysphonia, chest pain, abdominal pain, nausea and vomiting, new onset weakness or altered sensorium.    Medications Prior to Admission   Medication Sig Dispense Refill Last Dose    citalopram (CELEXA) 20 MG tablet Take 20 mg by mouth once daily.       diclofenac (VOLTAREN) 75 MG EC tablet Take 1 tablet (75 mg total) by mouth 2 (two) times daily as needed. 60 tablet 1     losartan (COZAAR) 25 MG tablet Take 1 tablet (25 mg total) by mouth once daily. 90 tablet 3     omeprazole (PRILOSEC) 40 MG capsule Take 1 capsule (40 mg total) by mouth once daily. 30 capsule 1     pregabalin (LYRICA) 100 MG capsule Take 1 capsule (100 mg total) by mouth 3 (three) times daily.  90 capsule 0     QUEtiapine (SEROQUEL) 100 MG Tab Take 1 tablet (100 mg total) by mouth once daily. 90 tablet 3     traMADoL (ULTRAM) 50 mg tablet Take 1 tablet (50 mg total) by mouth every 12 (twelve) hours as needed for Pain. 30 tablet 0        Review of patient's allergies indicates:   Allergen Reactions    Codeine      Rash and itch    Pcn [penicillins] Swelling     swelling       Past Medical History:   Diagnosis Date    Bipolar 1 disorder     Bipolar 1 disorder     bipolar c schizophrenia and ptsd    Depression     Polysubstance abuse     PTSD (post-traumatic stress disorder)     Schizoaffective disorder     Schizophrenia      Past Surgical History:   Procedure Laterality Date    TUBAL LIGATION       Family History    None       Tobacco Use    Smoking status: Former    Smokeless tobacco: Former     Quit date: 8/16/2013   Substance and Sexual Activity    Alcohol use: No    Drug use: No    Sexual activity: Yes     Review of Systems  Objective:     Weight: 112.3 kg (247 lb 9.2 oz)  Body mass index is 39.96 kg/m².  Vital Signs (Most Recent):  Temp: 98.8 °F (37.1 °C) (07/22/24 1135)  Pulse: 64 (07/22/24 1135)  Resp: 16 (07/22/24 1135)  BP: (!) 179/78 (07/22/24 1135)  SpO2: 96 % (07/22/24 1135) Vital Signs (24h Range):  Temp:  [98 °F (36.7 °C)-98.8 °F (37.1 °C)] 98.8 °F (37.1 °C)  Pulse:  [64-94] 64  Resp:  [8-20] 16  SpO2:  [92 %-97 %] 96 %  BP: (107-234)/() 179/78                                 Physical Exam         Neurosurgery Physical Exam    GENERAL: resting comfortably  HEENT: NCAT, PERRL, mucous membranes moist  NECK: supple, trachea midline  CV: normal capillary refill  PULM: aerating well, symmetric expansion, no distress  ABD: soft, NT, ND  EXT: no c/c/e    NEURO:    AAO x 3  CN II-XII grossly intact  BUE: 5/5  RLE: 5/5 except 4q/h (pain limited)  LLE: 5/5  Decreased sensation R L5/S1 dermatome    No hoffmans or clonus    Rectal deferred      Significant Labs:  Recent Labs   Lab 07/21/24  1216  "  *      K 3.9      CO2 19*   BUN 11   CREATININE 0.7   CALCIUM 9.2     Recent Labs   Lab 07/21/24  1216   WBC 6.82   HGB 15.8   HCT 47.5        No results for input(s): "LABPT", "INR", "APTT" in the last 48 hours.  Microbiology Results (last 7 days)       ** No results found for the last 168 hours. **          All pertinent labs from the last 24 hours have been reviewed.    Significant Diagnostics:  I have reviewed and interpreted all pertinent imaging results/findings within the past 24 hours.  CT Cervical Spine Without Contrast    Result Date: 7/21/2024  As above. Electronically signed by: Saray Carmona Date:    07/21/2024 Time:    17:41    CT Lumbar Spine Without Contrast    Result Date: 7/21/2024  Chronic degenerative disc disease and spondylosis at every level of the lower thoracic and lumbar spine.  Prior interbody fusion and posterior fixation at L5-S1.  Spinal canal or neural foraminal stenosis as described.  An acute fracture is not seen. Electronically signed by: Addison Brewster MD Date:    07/21/2024 Time:    14:23    CT Thoracic Spine Without Contrast    Result Date: 7/21/2024  Prominent spurs and osteophytes anteriorly and to the right from T3-L1.  Otherwise negative thoracic spine CT. Electronically signed by: Addison Brewster MD Date:    07/21/2024 Time:    14:10    CT Head Without Contrast    Result Date: 7/21/2024  Possible hypodensity of the left frontal lobe may represent cerebral edema or infarct.  MRI of the brain is recommended.  No intracranial hemorrhage is noted. Electronically signed by: Addison Brewster MD Date:    07/21/2024 Time:    14:06   Assessment/Plan:     Lumbar radiculopathy, chronic  This is a 58-year-old female with a past medical history of L5-S1 right-sided radiculopathy status post L5-S1 fusion with Dr. Davian Orourke and larisa abernathy [place] on November 20, 2024 the presents with persistent subjective weakness and recurrent falls, as well as " urinary and bowel urgency.  CT of the lumbar spine demonstrates aforementioned hardware in satisfactory position.  A CT of her head does demonstrate an area of hypoattenuation in the left frontal lobe, likely representing vasogenic edema.    --Admitted HM with q4h neurochecks.  --All labs and diagnostics reviewed.  --MRI Brain, Lsp.  --XR Lsp flex/ex  --Monitor for urinary retention.   --Please page with exam change or questions.    Dispo: ongoing.    Plan d/w Dr. Cook.        Thank you for your consult. I will follow-up with patient. Please contact us if you have any additional questions.    Naren Lux MD  Neurosurgery  [facility] - Neurosurgery ([facility])

## 2024-07-22 NOTE — ED NOTES
"Pt assisted to reposition. Pain level down to a "2." Call light in reach. Will cont to monitor.   "

## 2024-07-22 NOTE — CARE UPDATE
I have reviewed the chart of Dinora M Justin who is hospitalized for the following:    Active Hospital Problems    Diagnosis    *Lumbar radiculopathy, chronic    Nicotine dependence    Severe obesity (BMI >= 40)    Drug dependence     THC + on drug screen  [place] on cessation       Pain    Sensory loss    Essential hypertension    Anxiety disorder    Bipolar 1 disorder, depressed, moderate        Lupis Multani NP  Unit Based SARITHA

## 2024-07-22 NOTE — ASSESSMENT & PLAN NOTE
Ground level fall  Unclear of mechanical vs neurogenic, treat underlying issues, see above  Pt/ot  Neurochecks

## 2024-07-22 NOTE — SUBJECTIVE & OBJECTIVE
Past Medical History:   Diagnosis Date    Bipolar 1 disorder     Bipolar 1 disorder     bipolar c schizophrenia and ptsd    Depression     Polysubstance abuse     PTSD (post-traumatic stress disorder)     Schizoaffective disorder     Schizophrenia        Past Surgical History:   Procedure Laterality Date    TUBAL LIGATION         Review of patient's allergies indicates:   Allergen Reactions    Codeine      Rash and itch    Pcn [penicillins] Swelling     swelling       Current Facility-Administered Medications on File Prior to Encounter   Medication    [COMPLETED] aspirin tablet 325 mg    [COMPLETED] LORazepam (ATIVAN) injection 1 mg    [COMPLETED] morphine injection 2 mg    [COMPLETED] ondansetron injection 4 mg    [DISCONTINUED] aspirin EC tablet 81 mg    [DISCONTINUED] atorvastatin tablet 40 mg    [DISCONTINUED] bisacodyL suppository 10 mg    [DISCONTINUED] citalopram tablet 20 mg    [DISCONTINUED] enoxaparin injection 40 mg    [DISCONTINUED] hydrALAZINE injection 10 mg    [DISCONTINUED] labetaloL injection 10 mg    [DISCONTINUED] LORazepam (ATIVAN) injection 2 mg    [DISCONTINUED] ondansetron injection 4 mg    [DISCONTINUED] polyethylene glycol packet 17 g    [DISCONTINUED] pregabalin capsule 100 mg    [DISCONTINUED] QUEtiapine tablet 100 mg    [DISCONTINUED] QUEtiapine tablet 100 mg    [DISCONTINUED] sodium chloride 0.9% flush 10 mL    [DISCONTINUED] traMADoL tablet 50 mg     Current Outpatient Medications on File Prior to Encounter   Medication Sig    citalopram (CELEXA) 20 MG tablet Take 20 mg by mouth once daily.    diclofenac (VOLTAREN) 75 MG EC tablet Take 1 tablet (75 mg total) by mouth 2 (two) times daily as needed.    losartan (COZAAR) 25 MG tablet Take 1 tablet (25 mg total) by mouth once daily.    omeprazole (PRILOSEC) 40 MG capsule Take 1 capsule (40 mg total) by mouth once daily.    pregabalin (LYRICA) 100 MG capsule Take 1 capsule (100 mg total) by mouth 3 (three) times daily.    QUEtiapine  (SEROQUEL) 100 MG Tab Take 1 tablet (100 mg total) by mouth once daily.    traMADoL (ULTRAM) 50 mg tablet Take 1 tablet (50 mg total) by mouth every 12 (twelve) hours as needed for Pain.     Family History    None       Tobacco Use    Smoking status: Former    Smokeless tobacco: Former     Quit date: 8/16/2013   Substance and Sexual Activity    Alcohol use: No    Drug use: No    Sexual activity: Yes     Review of Systems  Objective:     Vital Signs (Most Recent):  Temp: 98.8 °F (37.1 °C) (07/22/24 1135)  Pulse: 64 (07/22/24 1135)  Resp: 20 (07/22/24 1155)  BP: (!) 179/78 (07/22/24 1135)  SpO2: 96 % (07/22/24 1135) Vital Signs (24h Range):  Temp:  [98 °F (36.7 °C)-98.8 °F (37.1 °C)] 98.8 °F (37.1 °C)  Pulse:  [64-94] 64  Resp:  [16-20] 20  SpO2:  [92 %-97 %] 96 %  BP: (107-202)/(54-99) 179/78     Weight: 112.3 kg (247 lb 9.2 oz)  Body mass index is 39.96 kg/m².     Physical Exam  Vitals reviewed.             General: well nourished, nontoxic appearing  Skin: Warm and dry  Eyes: No conjunctivitis, no scleral icterus  ENT: No nasal bleeding, no obvious discharge  Cardiovascular: Regular rate and rhythm.   Pulmonary/Chest: No accessory muscle use. Clear to auscultation bilaterally  Gastrointestinal: Abdomen soft, NT, ND.  Extremities: no clubbing, cyanosis or edema  Musculoskeletal: appropriate muscle bulk, intact movement of extremities  Neurological: no facial droop, no slurred speech; 4/5 hip flexor strength on the right, 5/5 hip flexor strength on the left; decreased sensation to light finger touch over the right lower extremity compared to the left   Intact sensation light touch over bilateral forearms and bilateral face; pupils equal bilaterally, extraocular motions intact  Psychiatric: appropriate mood and affect, insight intact

## 2024-07-22 NOTE — ASSESSMENT & PLAN NOTE
Transferred for evaluation of cauda equina syndrome   Noted on CT C-spine  History of L5-S1 right-sided fusion  MRI CT L-spine pending   Neurosurgery consulted

## 2024-07-22 NOTE — HPI
The patient is a very pleasant 58-year-old female with a past medical history of L5-S1 radiculopathy on the right side status post L5-S1 fusion on November 20, 2023 with Dr. Davian Orourke, who presents after a mechanical ground level fall on Saturday.  Patient reports that she fell on Saturday when her legs gave out, and fell on her right side.  She denies back or neck or head trauma.  She denies loss of consciousness.  She states that she was down for almost 24 hours before EMS arrived to take her to the hospital.  Currently, she states that she has shooting pain that travels down the right lower extremity along the right lateral thigh and leg and the plantar aspect of the foot which occurs when she gets muscle spasms.  She also has numbness and tingling in that same distribution.  Reports LLE paresthesias in the calf and plantar aspect of the foot. She reports functional decline for the last 4 months, gait imbalance, and hand clumsiness. She also reports bowel and bladder urgency since her surgery.  She denies any episodes of urinating or stooling on herself without realizing it.  She denies headache, vision/hearing changes, dysphagia/dysphonia, chest pain, abdominal pain, nausea and vomiting, new onset weakness or altered sensorium.

## 2024-07-22 NOTE — ED NOTES
Root and edgar care performed. Catheter placed without difficulty. Pt transferred to a hospital bed for comfort. Updated on POC. Call light in reach.

## 2024-07-22 NOTE — H&P
[facility] - Neurosurgery ([facility])  [facility] Medicine  History & Physical    Patient Name: Dinora Anderson  MRN: [id number]  Patient Class: IP- Inpatient  Admission Date: 7/22/2024  Attending Physician: rCisto Beaulieu MD   Primary Care Provider: Monroe Ly MD         Patient information was obtained from patient, past medical records, and ER records.     Subjective:     Principal Problem:Central stenosis of spinal canal    Chief Complaint: No chief complaint on file.       HPI: 58-year-old white female admitted as a transfer from [facility] for possible cauda equina syndrome.  Patient reports being in her usual state of health sometime up until 2 days ago when she had a fall at home landing on her right side.  She thinks she fell by simply tripping on her right foot which has been dragging for some time but she is not completely sure.  She does not recall having any dizziness prior to the fall but may have been dizzy either during and/or after the fall.  Prior to the fall there was no nausea headaches or vision changes.  No chest pains or shortness a breath.  She reports being unable to have the strength to get up and she just laid on the floor until Sunday morning when her neighbor came in and called EMS.  She reports having an impaired gait since her surgery in November of last year.  She also reports having worsening weakness and upper and lower extremities in the last few weeks.  She does report having chronic tingling sensation in the right lower extremity.  Patient reports having ongoing urge incontinence for both urine and stool that has been present since the last surgery.  She affirms having a sensation of urge but is unable to hold relieving herself before getting to the restroom.  Reports having frequent accidents.    Denies any history of cancer but does affirm currently smoking.  Denies any history of stroke or CAD.    OSH CT of the lumbar spine demonstrates aforementioned  hardware in satisfactory position. A CT of her head does demonstrate an area of hypoattenuation in the left frontal lobe, likely representing vasogenic edema. CT c spine read as  C5-C6 severe spinal canal stenosis. Tried to undergo MRI  but was unable to do so due to claustrophobia.  Transferred here to due concerns for cauda equina and NSGY eval.     Past Medical History:   Diagnosis Date    Bipolar 1 disorder     Bipolar 1 disorder     bipolar c schizophrenia and ptsd    Depression     Polysubstance abuse     PTSD (post-traumatic stress disorder)     Schizoaffective disorder     Schizophrenia        Past Surgical History:   Procedure Laterality Date    TUBAL LIGATION         Review of patient's allergies indicates:   Allergen Reactions    Codeine      Rash and itch    Pcn [penicillins] Swelling     swelling       Current Facility-Administered Medications on File Prior to Encounter   Medication    [COMPLETED] aspirin tablet 325 mg    [COMPLETED] LORazepam (ATIVAN) injection 1 mg    [COMPLETED] morphine injection 2 mg    [COMPLETED] ondansetron injection 4 mg    [DISCONTINUED] aspirin EC tablet 81 mg    [DISCONTINUED] atorvastatin tablet 40 mg    [DISCONTINUED] bisacodyL suppository 10 mg    [DISCONTINUED] citalopram tablet 20 mg    [DISCONTINUED] enoxaparin injection 40 mg    [DISCONTINUED] hydrALAZINE injection 10 mg    [DISCONTINUED] labetaloL injection 10 mg    [DISCONTINUED] LORazepam (ATIVAN) injection 2 mg    [DISCONTINUED] ondansetron injection 4 mg    [DISCONTINUED] polyethylene glycol packet 17 g    [DISCONTINUED] pregabalin capsule 100 mg    [DISCONTINUED] QUEtiapine tablet 100 mg    [DISCONTINUED] QUEtiapine tablet 100 mg    [DISCONTINUED] sodium chloride 0.9% flush 10 mL    [DISCONTINUED] traMADoL tablet 50 mg     Current Outpatient Medications on File Prior to Encounter   Medication Sig    citalopram (CELEXA) 20 MG tablet Take 20 mg by mouth once daily.    diclofenac (VOLTAREN) 75 MG EC tablet Take 1  tablet (75 mg total) by mouth 2 (two) times daily as needed.    losartan (COZAAR) 25 MG tablet Take 1 tablet (25 mg total) by mouth once daily.    omeprazole (PRILOSEC) 40 MG capsule Take 1 capsule (40 mg total) by mouth once daily.    pregabalin (LYRICA) 100 MG capsule Take 1 capsule (100 mg total) by mouth 3 (three) times daily.    QUEtiapine (SEROQUEL) 100 MG Tab Take 1 tablet (100 mg total) by mouth once daily.    traMADoL (ULTRAM) 50 mg tablet Take 1 tablet (50 mg total) by mouth every 12 (twelve) hours as needed for Pain.     Family History    None       Tobacco Use    Smoking status: Former    Smokeless tobacco: Former     Quit date: 8/16/2013   Substance and Sexual Activity    Alcohol use: No    Drug use: No    Sexual activity: Yes     Review of Systems  Objective:     Vital Signs (Most Recent):  Temp: 98.8 °F (37.1 °C) (07/22/24 1135)  Pulse: 64 (07/22/24 1135)  Resp: 20 (07/22/24 1155)  BP: (!) 179/78 (07/22/24 1135)  SpO2: 96 % (07/22/24 1135) Vital Signs (24h Range):  Temp:  [98 °F (36.7 °C)-98.8 °F (37.1 °C)] 98.8 °F (37.1 °C)  Pulse:  [64-94] 64  Resp:  [16-20] 20  SpO2:  [92 %-97 %] 96 %  BP: (107-202)/(54-99) 179/78     Weight: 112.3 kg (247 lb 9.2 oz)  Body mass index is 39.96 kg/m².     Physical Exam  Vitals reviewed.             General: well nourished, nontoxic appearing  Skin: Warm and dry  Eyes: No conjunctivitis, no scleral icterus  ENT: No nasal bleeding, no obvious discharge  Cardiovascular: Regular rate and rhythm.   Pulmonary/Chest: No accessory muscle use. Clear to auscultation bilaterally  Gastrointestinal: Abdomen soft, NT, ND.  Extremities: no clubbing, cyanosis or edema  Musculoskeletal: appropriate muscle bulk, intact movement of extremities  Neurological: no facial droop, no slurred speech; 4/5 hip flexor strength on the right, 5/5 hip flexor strength on the left; decreased sensation to light finger touch over the right lower extremity compared to the left   Intact sensation light  touch over bilateral forearms and bilateral face; pupils equal bilaterally, extraocular motions intact  Psychiatric: appropriate mood and affect, insight intact        Assessment/Plan:     * Central stenosis of spinal canal  Transferred for evaluation of cauda equina syndrome   Noted on CT C-spine  History of L5-S1 right-sided fusion  MRI CT L-spine pending   Neurosurgery consulted      Brain lesion  Noted on CT head showing Possible hypodensity of the left frontal lobe may represent cerebral edema or infarct   Neuro checks   MRI brain with contrast pending      Fall  Ground level fall  Unclear of mechanical vs neurogenic, treat underlying issues, see above  Pt/ot  Neurochecks        Lumbar radiculopathy, chronic  Lyrica      Essential hypertension  Losartan    Bipolar 1 disorder, depressed, moderate  Seroquel  lexapro        VTE Risk Mitigation (From admission, onward)           Ordered     IP VTE HIGH RISK PATIENT  Once         07/22/24 0651     Place sequential compression device  Until discontinued         07/22/24 0651                                    Cristo Beaulieu MD  Department of Hospital Medicine  [facility] - Neurosurgery ([facility])

## 2024-07-22 NOTE — PLAN OF CARE
[facility] (Hospital)  Initial Discharge Assessment       Primary Care Provider: Monroe Ly MD    Admission Diagnosis: Acute back pain [M54.9]    Admission Date: 7/22/2024  Expected Discharge Date: 7/23/2024    Transition of Care Barriers: None    [place] / [place] [facility] [place] PLAN [place] / Product Type: Managed Medicaid /     Extended Emergency Contact Information  Primary Emergency Contact: Kristi Thompson   [place]  Mobile Phone: [phone]  Relation: Daughter  Secondary Emergency Contact: Margarita Thompson/Daughter   [facility]  Home Phone: [phone]  Work Phone: [phone]  Relation: Daughter    Discharge Plan A: Rehab  Discharge Plan B: Skilled Nursing Facility      [facility] [address] [place] [address] [facility] [address]  [address] [place] [address]  [facility] [address]  Phone: [phone] Fax: [phone]    CM obtained discharge planning assessment with patient.  Initial Assessment (most recent)       Adult Discharge Assessment - 07/22/24 1608          Discharge Assessment    Assessment Type Discharge Planning Assessment     Confirmed/corrected address, phone number and insurance Yes     Confirmed Demographics Correct on Facesheet     Source of Information patient     Communicated GLENNA with patient/caregiver Date not available/Unable to determine     Reason For Admission Central stenosis of spinal canal     People in Home alone     Do you expect to return to your current living situation? Yes     Do you have help at home or someone to help you manage your care at home? No     Prior to hospitilization cognitive status: Alert/Oriented     Current cognitive status: Alert/Oriented     Walking or Climbing Stairs Difficulty yes     Walking or Climbing Stairs ambulation difficulty, requires equipment     Mobility Management rollator     Dressing/Bathing Difficulty no     Equipment Currently Used at Home rollator     Readmission within 30 days? No      Patient currently being followed by outpatient case management? No     Do you currently have service(s) that help you manage your care at home? No     Do you take prescription medications? Yes     Do you have prescription coverage? Yes     Coverage [place] [facility] COMMUNITY PLAN     Do you have any problems affording any of your prescribed medications? No     Is the patient taking medications as prescribed? yes     Who is going to help you get home at discharge? family     How do you get to doctors appointments? family or friend will provide     Are you on dialysis? No     Do you take coumadin? No     Discharge Plan A Rehab     Discharge Plan B Skilled Nursing Facility     DME Needed Upon Discharge  other (see comments)   tbd    Discharge Plan discussed with: Patient     Transition of Care Barriers None        Physical Activity    On average, how many days per week do you engage in moderate to strenuous exercise (like a brisk walk)? 0 days     On average, how many minutes do you engage in exercise at this level? 0 min        Financial Resource Strain    How hard is it for you to pay for the very basics like food, housing, medical care, and heating? Not hard at all        Housing Stability    In the last 12 months, was there a time when you were not able to pay the mortgage or rent on time? No     At any time in the past 12 months, were you homeless or living in a shelter (including now)? No        Transportation Needs    Has the lack of transportation kept you from medical appointments, meetings, work or from getting things needed for daily living? Yes, it has kept me from medical appointments or from getting my medications.        Food Insecurity    Within the past 12 months, you worried that your food would run out before you got the money to buy more. Never true     Within the past 12 months, the food you bought just didn't last and you didn't have money to get more. Never true         Stress    Do you feel stress - tense, restless, nervous, or anxious, or unable to sleep at night because your mind is troubled all the time - these days? Very much        Social Isolation    How often do you feel lonely or isolated from those around you?  Often        Alcohol Use    Q1: How often do you have a drink containing alcohol? Never     Q2: How many drinks containing alcohol do you have on a typical day when you are drinking? Patient does not drink     Q3: How often do you have six or more drinks on one occasion? Never        Utilities    In the past 12 months has the electric, gas, oil, or water company threatened to shut off services in your home? No        Health Literacy    How often do you need to have someone help you when you read instructions, pamphlets, or other written material from your doctor or pharmacy? Never

## 2024-07-22 NOTE — CARE UPDATE
Discussed with [facility]. Holly who recommended to transfer for MRI spine and then make a determination for complete transfer to [facility] or return to [place] if MRI is negative for cauda equina

## 2024-07-22 NOTE — SUBJECTIVE & OBJECTIVE
Medications Prior to Admission   Medication Sig Dispense Refill Last Dose    citalopram (CELEXA) 20 MG tablet Take 20 mg by mouth once daily.       diclofenac (VOLTAREN) 75 MG EC tablet Take 1 tablet (75 mg total) by mouth 2 (two) times daily as needed. 60 tablet 1     losartan (COZAAR) 25 MG tablet Take 1 tablet (25 mg total) by mouth once daily. 90 tablet 3     omeprazole (PRILOSEC) 40 MG capsule Take 1 capsule (40 mg total) by mouth once daily. 30 capsule 1     pregabalin (LYRICA) 100 MG capsule Take 1 capsule (100 mg total) by mouth 3 (three) times daily. 90 capsule 0     QUEtiapine (SEROQUEL) 100 MG Tab Take 1 tablet (100 mg total) by mouth once daily. 90 tablet 3     traMADoL (ULTRAM) 50 mg tablet Take 1 tablet (50 mg total) by mouth every 12 (twelve) hours as needed for Pain. 30 tablet 0        Review of patient's allergies indicates:   Allergen Reactions    Codeine      Rash and itch    Pcn [penicillins] Swelling     swelling       Past Medical History:   Diagnosis Date    Bipolar 1 disorder     Bipolar 1 disorder     bipolar c schizophrenia and ptsd    Depression     Polysubstance abuse     PTSD (post-traumatic stress disorder)     Schizoaffective disorder     Schizophrenia      Past Surgical History:   Procedure Laterality Date    TUBAL LIGATION       Family History    None       Tobacco Use    Smoking status: Former    Smokeless tobacco: Former     Quit date: 8/16/2013   Substance and Sexual Activity    Alcohol use: No    Drug use: No    Sexual activity: Yes     Review of Systems  Objective:     Weight: 112.3 kg (247 lb 9.2 oz)  Body mass index is 39.96 kg/m².  Vital Signs (Most Recent):  Temp: 98.8 °F (37.1 °C) (07/22/24 1135)  Pulse: 64 (07/22/24 1135)  Resp: 16 (07/22/24 1135)  BP: (!) 179/78 (07/22/24 1135)  SpO2: 96 % (07/22/24 1135) Vital Signs (24h Range):  Temp:  [98 °F (36.7 °C)-98.8 °F (37.1 °C)] 98.8 °F (37.1 °C)  Pulse:  [64-94] 64  Resp:  [8-20] 16  SpO2:  [92 %-97 %] 96 %  BP:  "(233-450)/(54105) 179/78                                 Physical Exam         Neurosurgery Physical Exam    GENERAL: resting comfortably  HEENT: NCAT, PERRL, mucous membranes moist  NECK: supple, trachea midline  CV: normal capillary refill  PULM: aerating well, symmetric expansion, no distress  ABD: soft, NT, ND  EXT: no c/c/e    NEURO:    AAO x 3  CN II-XII grossly intact  BUE: 5/5  RLE: 5/5 except 4q/h (pain limited)  LLE: 5/5  Decreased sensation R L5/S1 dermatome    No hoffmans or clonus    Rectal deferred      Significant Labs:  Recent Labs   Lab 07/21/24  1216   *      K 3.9      CO2 19*   BUN 11   CREATININE 0.7   CALCIUM 9.2     Recent Labs   Lab 07/21/24  1216   WBC 6.82   HGB 15.8   HCT 47.5        No results for input(s): "LABPT", "INR", "APTT" in the last 48 hours.  Microbiology Results (last 7 days)       ** No results found for the last 168 hours. **          All pertinent labs from the last 24 hours have been reviewed.    Significant Diagnostics:  I have reviewed and interpreted all pertinent imaging results/findings within the past 24 hours.  CT Cervical Spine Without Contrast    Result Date: 7/21/2024  As above. Electronically signed by: Saray Carmona Date:    07/21/2024 Time:    17:41    CT Lumbar Spine Without Contrast    Result Date: 7/21/2024  Chronic degenerative disc disease and spondylosis at every level of the lower thoracic and lumbar spine.  Prior interbody fusion and posterior fixation at L5-S1.  Spinal canal or neural foraminal stenosis as described.  An acute fracture is not seen. Electronically signed by: Addison Brewster MD Date:    07/21/2024 Time:    14:23    CT Thoracic Spine Without Contrast    Result Date: 7/21/2024  Prominent spurs and osteophytes anteriorly and to the right from T3-L1.  Otherwise negative thoracic spine CT. Electronically signed by: Addison Brewster MD Date:    07/21/2024 Time:    14:10    CT Head Without Contrast    Result " Date: 7/21/2024  Possible hypodensity of the left frontal lobe may represent cerebral edema or infarct.  MRI of the brain is recommended.  No intracranial hemorrhage is noted. Electronically signed by: Addison Brewster MD Date:    07/21/2024 Time:    14:06

## 2024-07-22 NOTE — CONSULTS
[facility] ([facility])  Wound Care    Patient Name:  Dinora Anderson   MRN:  [id number]  Date: 7/22/2024  Diagnosis: Central stenosis of spinal canal    History:     Past Medical History:   Diagnosis Date    Bipolar 1 disorder     Bipolar 1 disorder     bipolar c schizophrenia and ptsd    Depression     Polysubstance abuse     PTSD (post-traumatic stress disorder)     Schizoaffective disorder     Schizophrenia        Social History     Socioeconomic History    Marital status: Single   Tobacco Use    Smoking status: Former    Smokeless tobacco: Former     Quit date: 8/16/2013   Substance and Sexual Activity    Alcohol use: No    Drug use: No    Sexual activity: Yes       Precautions:     Allergies as of 07/22/2024 - Reviewed 07/22/2024   Allergen Reaction Noted    Codeine  03/16/2014    Pcn [penicillins] Swelling 03/16/2014       WOC Assessment Details/Treatment     Patient seen for wound care consult. Patient with redness beneath her breasts and pannus that is somewhat itchy and appears to be fungal in nature. Discussed with patient who states barrier ointment was applied per nursing last night and has helped some. She has used antifungal in the past when this has occurred.     Recommendations made to primary team for :  Bedside nurse to perform wound care to underside of breasts and pannus.  Cleanse with no rinse foam cleanser  Pat dry.  Apply miconazole ointment to red areas   Orders placed.     *photo documentation not available at this time  07/22/2024

## 2024-07-22 NOTE — ASSESSMENT & PLAN NOTE
Noted on CT head showing Possible hypodensity of the left frontal lobe may represent cerebral edema or infarct   Neuro checks   MRI brain with contrast pending

## 2024-07-22 NOTE — NURSING
Patient Transferred to NPU Room 925       Upon arrival to the floor, patient greeted and oriented to room. Complete head to toe assessment completed per protocol. VSS, see flowsheet for details. Neuro assessment completed. Primary team notified of patient's transfer to floor. All current and transfer orders reviewed/reconciled per protocol. All emergency equipment set up in patient's room. Fall/seizure precautions initiated per providers orders. 4 Eyes skin assessment performed, see below for details. Reviewed assessment and rounding frequency with patient and family. Questions were encouraged and addressed. Repositioned patient for comfort with bed locked in lowest position, side rails up x 2, bed alarm set, and call light within reach. Instructed patient to call staff for mobility/assistance, verbalized understanding. No acute signs of distress noted at this time.         Nurses Note -- 4 Eyes      7/22/2024   5:46 AM      Skin assessed during: Admit      [] No Altered Skin Integrity Present    []Prevention Measures Documented      [x] Yes- Altered Skin Integrity Present or Discovered   [] LDA Added if Not in Epic (Describe Wound)   [] New Altered Skin Integrity was Present on Admit and Documented in LDA   [] Wound Image Taken    Wound Care Consulted? Yes    Attending Nurse:  Ambreen Mcduffie RN/Staff Member:   Teri    Rash noted to groin and under breast

## 2024-07-22 NOTE — HPI
58-year-old white female admitted as a transfer from [facility] for possible cauda equina syndrome.  Patient reports being in her usual state of health sometime up until 2 days ago when she had a fall at home landing on her right side.  She thinks she fell by simply tripping on her right foot which has been dragging for some time but she is not completely sure.  She does not recall having any dizziness prior to the fall but may have been dizzy either during and/or after the fall.  Prior to the fall there was no nausea headaches or vision changes.  No chest pains or shortness a breath.  She reports being unable to have the strength to get up and she just laid on the floor until Sunday morning when her neighbor came in and called EMS.  She reports having an impaired gait since her surgery in November of last year.  She also reports having worsening weakness and upper and lower extremities in the last few weeks.  She does report having chronic tingling sensation in the right lower extremity.  Patient reports having ongoing urge incontinence for both urine and stool that has been present since the last surgery.  She affirms having a sensation of urge but is unable to hold relieving herself before getting to the restroom.  Reports having frequent accidents.    Denies any history of cancer but does affirm currently smoking.  Denies any history of stroke or CAD.    OSH CT of the lumbar spine demonstrates aforementioned hardware in satisfactory position. A CT of her head does demonstrate an area of hypoattenuation in the left frontal lobe, likely representing vasogenic edema. CT c spine read as  C5-C6 severe spinal canal stenosis. Tried to undergo MRI  but was unable to do so due to claustrophobia.  Transferred here to due concerns for cauda equina and MARGO saxena.

## 2024-07-23 PROBLEM — G95.9 LUMBAR MYELOPATHY: Status: ACTIVE | Noted: 2024-07-23

## 2024-07-23 LAB
ALBUMIN SERPL BCP-MCNC: 3 G/DL (ref 3.5–5.2)
ALP SERPL-CCNC: 127 U/L (ref 55–135)
ALT SERPL W/O P-5'-P-CCNC: 70 U/L (ref 10–44)
ANION GAP SERPL CALC-SCNC: 8 MMOL/L (ref 8–16)
AST SERPL-CCNC: 62 U/L (ref 10–40)
BASOPHILS # BLD AUTO: 0.01 K/UL (ref 0–0.2)
BASOPHILS NFR BLD: 0.2 % (ref 0–1.9)
BILIRUB SERPL-MCNC: 0.6 MG/DL (ref 0.1–1)
BUN SERPL-MCNC: 8 MG/DL (ref 6–20)
CALCIUM SERPL-MCNC: 8.8 MG/DL (ref 8.7–10.5)
CHLORIDE SERPL-SCNC: 106 MMOL/L (ref 95–110)
CO2 SERPL-SCNC: 24 MMOL/L (ref 23–29)
CREAT SERPL-MCNC: 0.7 MG/DL (ref 0.5–1.4)
DIFFERENTIAL METHOD BLD: ABNORMAL
EOSINOPHIL # BLD AUTO: 0.1 K/UL (ref 0–0.5)
EOSINOPHIL NFR BLD: 1.4 % (ref 0–8)
ERYTHROCYTE [DISTWIDTH] IN BLOOD BY AUTOMATED COUNT: 14.6 % (ref 11.5–14.5)
EST. GFR  (NO RACE VARIABLE): >60 ML/MIN/1.73 M^2
GLUCOSE SERPL-MCNC: 132 MG/DL (ref 70–110)
HCT VFR BLD AUTO: 43 % (ref 37–48.5)
HGB BLD-MCNC: 14 G/DL (ref 12–16)
IMM GRANULOCYTES # BLD AUTO: 0.02 K/UL (ref 0–0.04)
IMM GRANULOCYTES NFR BLD AUTO: 0.5 % (ref 0–0.5)
INR PPP: 1 (ref 0.8–1.2)
LYMPHOCYTES # BLD AUTO: 1.6 K/UL (ref 1–4.8)
LYMPHOCYTES NFR BLD: 36.8 % (ref 18–48)
MAGNESIUM SERPL-MCNC: 2.3 MG/DL (ref 1.6–2.6)
MCH RBC QN AUTO: 30.2 PG (ref 27–31)
MCHC RBC AUTO-ENTMCNC: 32.6 G/DL (ref 32–36)
MCV RBC AUTO: 93 FL (ref 82–98)
MONOCYTES # BLD AUTO: 0.3 K/UL (ref 0.3–1)
MONOCYTES NFR BLD: 6.8 % (ref 4–15)
NEUTROPHILS # BLD AUTO: 2.3 K/UL (ref 1.8–7.7)
NEUTROPHILS NFR BLD: 54.3 % (ref 38–73)
NRBC BLD-RTO: 0 /100 WBC
PHOSPHATE SERPL-MCNC: 3 MG/DL (ref 2.7–4.5)
PLATELET # BLD AUTO: 144 K/UL (ref 150–450)
PMV BLD AUTO: 10.7 FL (ref 9.2–12.9)
POTASSIUM SERPL-SCNC: 3.7 MMOL/L (ref 3.5–5.1)
PROT SERPL-MCNC: 6.6 G/DL (ref 6–8.4)
PROTHROMBIN TIME: 10.9 SEC (ref 9–12.5)
RBC # BLD AUTO: 4.64 M/UL (ref 4–5.4)
SODIUM SERPL-SCNC: 138 MMOL/L (ref 136–145)
WBC # BLD AUTO: 4.29 K/UL (ref 3.9–12.7)

## 2024-07-23 PROCEDURE — 25000003 PHARM REV CODE 250: Performed by: HOSPITALIST

## 2024-07-23 PROCEDURE — 85025 COMPLETE CBC W/AUTO DIFF WBC: CPT | Performed by: HOSPITALIST

## 2024-07-23 PROCEDURE — 80053 COMPREHEN METABOLIC PANEL: CPT | Performed by: HOSPITALIST

## 2024-07-23 PROCEDURE — 63600175 PHARM REV CODE 636 W HCPCS: Mod: UD | Performed by: HOSPITALIST

## 2024-07-23 PROCEDURE — 99024 POSTOP FOLLOW-UP VISIT: CPT | Mod: ,,, | Performed by: PHYSICIAN ASSISTANT

## 2024-07-23 PROCEDURE — 84100 ASSAY OF PHOSPHORUS: CPT | Performed by: HOSPITALIST

## 2024-07-23 PROCEDURE — 83735 ASSAY OF MAGNESIUM: CPT | Performed by: HOSPITALIST

## 2024-07-23 PROCEDURE — 25500020 PHARM REV CODE 255: Performed by: HOSPITALIST

## 2024-07-23 PROCEDURE — 63600175 PHARM REV CODE 636 W HCPCS: Performed by: HOSPITALIST

## 2024-07-23 PROCEDURE — 85610 PROTHROMBIN TIME: CPT | Performed by: HOSPITALIST

## 2024-07-23 PROCEDURE — A9585 GADOBUTROL INJECTION: HCPCS | Performed by: HOSPITALIST

## 2024-07-23 PROCEDURE — 11000001 HC ACUTE MED/SURG PRIVATE ROOM

## 2024-07-23 PROCEDURE — 36415 COLL VENOUS BLD VENIPUNCTURE: CPT | Performed by: HOSPITALIST

## 2024-07-23 RX ORDER — GADOBUTROL 604.72 MG/ML
10 INJECTION INTRAVENOUS
Status: COMPLETED | OUTPATIENT
Start: 2024-07-23 | End: 2024-07-23

## 2024-07-23 RX ADMIN — ALUMINUM HYDROXIDE, MAGNESIUM HYDROXIDE, AND SIMETHICONE 30 ML: 1200; 120; 1200 SUSPENSION ORAL at 05:07

## 2024-07-23 RX ADMIN — TRAMADOL HYDROCHLORIDE 50 MG: 50 TABLET, COATED ORAL at 05:07

## 2024-07-23 RX ADMIN — MICONAZOLE NITRATE: 20 OINTMENT TOPICAL at 08:07

## 2024-07-23 RX ADMIN — PREGABALIN 100 MG: 50 CAPSULE ORAL at 05:07

## 2024-07-23 RX ADMIN — QUETIAPINE FUMARATE 100 MG: 25 TABLET ORAL at 08:07

## 2024-07-23 RX ADMIN — SUCRALFATE 1 G: 1 SUSPENSION ORAL at 12:07

## 2024-07-23 RX ADMIN — CITALOPRAM HYDROBROMIDE 20 MG: 20 TABLET ORAL at 09:07

## 2024-07-23 RX ADMIN — MORPHINE SULFATE 2 MG: 2 INJECTION, SOLUTION INTRAMUSCULAR; INTRAVENOUS at 12:07

## 2024-07-23 RX ADMIN — MICONAZOLE NITRATE: 20 OINTMENT TOPICAL at 09:07

## 2024-07-23 RX ADMIN — SUCRALFATE 1 G: 1 SUSPENSION ORAL at 05:07

## 2024-07-23 RX ADMIN — Medication 6 MG: at 08:07

## 2024-07-23 RX ADMIN — ALUMINUM HYDROXIDE, MAGNESIUM HYDROXIDE, AND SIMETHICONE 30 ML: 1200; 120; 1200 SUSPENSION ORAL at 08:07

## 2024-07-23 RX ADMIN — MORPHINE SULFATE 2 MG: 2 INJECTION, SOLUTION INTRAMUSCULAR; INTRAVENOUS at 06:07

## 2024-07-23 RX ADMIN — DIAZEPAM 2.5 MG: 10 INJECTION, SOLUTION INTRAMUSCULAR; INTRAVENOUS at 12:07

## 2024-07-23 RX ADMIN — PANTOPRAZOLE SODIUM 40 MG: 40 TABLET, DELAYED RELEASE ORAL at 09:07

## 2024-07-23 RX ADMIN — PREGABALIN 100 MG: 50 CAPSULE ORAL at 09:07

## 2024-07-23 RX ADMIN — TRAMADOL HYDROCHLORIDE 50 MG: 50 TABLET, COATED ORAL at 06:07

## 2024-07-23 RX ADMIN — LOSARTAN POTASSIUM 25 MG: 25 TABLET, FILM COATED ORAL at 09:07

## 2024-07-23 RX ADMIN — ALUMINUM HYDROXIDE, MAGNESIUM HYDROXIDE, AND SIMETHICONE 30 ML: 1200; 120; 1200 SUSPENSION ORAL at 09:07

## 2024-07-23 RX ADMIN — GADOBUTROL 10 ML: 604.72 INJECTION INTRAVENOUS at 01:07

## 2024-07-23 NOTE — PLAN OF CARE
Problem: Adult Inpatient Plan of Care  Goal: Plan of Care Review  Outcome: Progressing  Goal: Patient-Specific Goal (Individualized)  Outcome: Progressing  Goal: Absence of Hospital-Acquired Illness or Injury  Outcome: Progressing  Goal: Optimal Comfort and Wellbeing  Outcome: Progressing  Goal: Readiness for Transition of Care  Outcome: Progressing     Problem: Bariatric Environmental Safety  Goal: Safety Maintained with Care  Outcome: Progressing     Problem: Fall Injury Risk  Goal: Absence of Fall and Fall-Related Injury  Outcome: Progressing     Problem: Skin Injury Risk Increased  Goal: Skin Health and Integrity  Outcome: Progressing

## 2024-07-23 NOTE — CARE UPDATE
I have reviewed the chart of Dinora Anderson who is hospitalized for the following:    Active Hospital Problems    Diagnosis    *Central stenosis of spinal canal    Lumbar myelopathy    Nicotine dependence    Severe obesity (BMI >= 40)    Drug dependence     THC + on drug screen  [place] on cessation       Pain    Sensory loss    Fall    Brain lesion    Lumbar radiculopathy, chronic    Essential hypertension    Schizophrenia    Anxiety disorder    Bipolar 1 disorder, depressed, moderate        Lupis Multani NP  Unit Based SARITHA

## 2024-07-23 NOTE — PLAN OF CARE
CHW met with patient/family at bedside. Patient experience rounding completed and reviewed the following.     Do you know your discharge plan? Yes,    If yes, what is the plan? Home     Have you discussed your needs and preferences with your SW/CM? Yes     If you are discharging home, do you have help at home? No    Do you think you will need help additional at home at discharge? Yes     Do you currently have difficulty keeping up with bills, affording medicine or buying food? No    CHW delivered resources in transportation and housing to pt at bedside.    Assigned SW/CM notified of any patient/family needs or concerns. Appropriate resources provided to address patient's needs.

## 2024-07-23 NOTE — PLAN OF CARE
[id number]  sent a secure chat to Dr. Beaulieu to notify MD patient does not have PT/OT orders..     Awaiting MD response

## 2024-07-23 NOTE — PLAN OF CARE
When CM obtained discharge planning assessment, patient advised she was having transportation issues and trouble with housekeeping.  CM requested covering CHW for the day obtain resources and provide to patient.

## 2024-07-23 NOTE — SUBJECTIVE & OBJECTIVE
Interval History: NAEON. AFVSS. C/o LLE lateral leg pain to the foot. Paresthesias calves and plantar aspect of feet. Gait imbalance, clumsiness, dropping things. All the above sx present for 4 months. States she does not feel like herself and cannot take care of herself. MRI Brain without acute abnormality. MRI L spine with hardware artifact, no severe canal stenosis.     Medications:  Continuous Infusions:   0.9% NaCl   Intravenous Continuous 75 mL/hr at 07/22/24 1454 Rate Verify at 07/22/24 1454     Scheduled Meds:   aluminum-magnesium hydroxide-simethicone  30 mL Oral QID (AC & HS)    citalopram  20 mg Oral Daily    losartan  25 mg Oral Daily    miconazole nitrate 2%   Topical (Top) BID    pantoprazole  40 mg Oral Daily    polyethylene glycol  17 g Oral Daily    pregabalin  100 mg Oral TID    QUEtiapine  100 mg Oral QHS    sucralfate  1 g Oral Q6H     PRN Meds:  Current Facility-Administered Medications:     acetaminophen, 650 mg, Oral, Q6H PRN    albuterol-ipratropium, 3 mL, Nebulization, Q4H PRN    dextrose 10%, 12.5 g, Intravenous, PRN    dextrose 10%, 25 g, Intravenous, PRN    diazePAM, 2.5 mg, Intravenous, On Call Procedure    glucagon (human recombinant), 1 mg, Intramuscular, PRN    glucose, 16 g, Oral, PRN    glucose, 24 g, Oral, PRN    melatonin, 6 mg, Oral, Nightly PRN    morphine, 2 mg, Intravenous, Q4H PRN    naloxone, 0.02 mg, Intravenous, PRN    ondansetron, 4 mg, Intravenous, Q6H PRN    senna-docusate 8.6-50 mg, 2 tablet, Oral, BID PRN    sodium chloride 0.9%, 10 mL, Intravenous, Q12H PRN    traMADoL, 50 mg, Oral, Q12H PRN     Review of Systems  Objective:     Weight: 112.3 kg (247 lb 9.2 oz)  Body mass index is 39.96 kg/m².  Vital Signs (Most Recent):  Temp: 98 °F (36.7 °C) (07/23/24 0805)  Pulse: 75 (07/23/24 0805)  Resp: 20 (07/23/24 0805)  BP: (!) 141/80 (07/23/24 0805)  SpO2: 97 % (07/23/24 0805) Vital Signs (24h Range):  Temp:  [97.9 °F (36.6 °C)-98.9 °F (37.2 °C)] 98 °F (36.7 °C)  Pulse:   [64-83] 75  Resp:  [16-20] 20  SpO2:  [92 %-97 %] 97 %  BP: (119-179)/(64-88) 141/80                                 Physical Exam     Neurosurgery Physical Exam    General: well developed, well nourished, no distress.   Head: normocephalic, atraumatic  Neurologic: Alert and oriented. Thought content appropriate.  GCS: Motor: 6/Verbal: 5/Eyes: 4 GCS Total: 15  Mental Status: Awake, Alert, Oriented x 4  Language: No aphasia  Speech: No dysarthria  Cranial nerves: face symmetric, tongue midline, CN II-XII grossly intact.   Eyes: pupils equal, round, reactive to light with accomodation, EOMI.   Pulmonary: normal respirations, no signs of respiratory distress  Sensory: intact to light touch throughout  Motor Strength: Moves all extremities spontaneously with good tone. No abnormal movements seen. Pain limited weakness right proximal LE.    Strength  Deltoids Triceps Biceps Wrist Extension Wrist Flexion Hand    Upper: R 5/5 5/5 5/5 5/5 5/5 5/5    L 5/5 5/5 5/5 5/5 5/5 5/5     Iliopsoas Quadriceps Knee  Flexion Tibialis  anterior Gastro- cnemius EHL   Lower: R 4+/5 4+/5 4+/5 5/5 5/5 5/5    L 5/5 5/5 5/5 5/5 5/5 5/5     DTR's - 2 + and symmetric in UE and LE  Byrnes: present bilaterally  Clonus: absent  Skin: Skin is warm, dry and intact.  Prior lumbar incision well healed. No surrounding edema.      Significant Labs:  Recent Labs   Lab 07/21/24  1216 07/22/24  1459 07/23/24  0340   * 140* 132*    136 138   K 3.9 4.3 3.7    107 106   CO2 19* 23 24   BUN 11 9 8   CREATININE 0.7 0.7 0.7   CALCIUM 9.2 8.7 8.8   MG  --   --  2.3     Recent Labs   Lab 07/21/24  1216 07/22/24  1459 07/23/24  0340   WBC 6.82 5.50 4.29   HGB 15.8 14.3 14.0   HCT 47.5 45.1 43.0    150 144*     Recent Labs   Lab 07/23/24  0340   INR 1.0     Microbiology Results (last 7 days)       ** No results found for the last 168 hours. **          Recent Lab Results         07/23/24  0340   07/22/24  1459        Albumin 3.0    3.1          132       ALT 70   84       Anion Gap 8   6       AST 62   97       Baso # 0.01   0.02       Basophil % 0.2   0.4       BILIRUBIN TOTAL 0.6  Comment: For infants and newborns, interpretation of results should be based  on gestational age, weight and in agreement with clinical  observations.    Premature Infant recommended reference ranges:  Up to 24 hours.............<8.0 mg/dL  Up to 48 hours............<12.0 mg/dL  3-5 days..................<15.0 mg/dL  6-29 days.................<15.0 mg/dL     0.6  Comment: For infants and newborns, interpretation of results should be based  on gestational age, weight and in agreement with clinical  observations.    Premature Infant recommended reference ranges:  Up to 24 hours.............<8.0 mg/dL  Up to 48 hours............<12.0 mg/dL  3-5 days..................<15.0 mg/dL  6-29 days.................<15.0 mg/dL         BUN 8   9       Calcium 8.8   8.7       Chloride 106   107       CO2 24   23       Creatinine 0.7   0.7       Differential Method Automated   Automated       eGFR >60.0   >60.0       Eos # 0.1   0.0       Eos % 1.4   0.4       Glucose 132   140       Gran # (ANC) 2.3   3.8       Gran % 54.3   68.8       Hematocrit 43.0   45.1       Hemoglobin 14.0   14.3       Immature Grans (Abs) 0.02  Comment: Mild elevation in immature granulocytes is non specific and   can be seen in a variety of conditions including stress response,   acute inflammation, trauma and pregnancy. Correlation with other   laboratory and clinical findings is essential.     0.03  Comment: Mild elevation in immature granulocytes is non specific and   can be seen in a variety of conditions including stress response,   acute inflammation, trauma and pregnancy. Correlation with other   laboratory and clinical findings is essential.         Immature Granulocytes 0.5   0.5       INR 1.0  Comment: Coumadin Therapy:  2.0 - 3.0 for INR for all indicators except mechanical heart  valves  and antiphospholipid syndromes which should use 2.5 - 3.5.           Lymph # 1.6   1.3       Lymph % 36.8   23.5       Magnesium  2.3         MCH 30.2   29.4       MCHC 32.6   31.7       MCV 93   93       Mono # 0.3   0.4       Mono % 6.8   6.4       MPV 10.7   10.7       nRBC 0   0       Phosphorus Level 3.0         Platelet Count 144   150       Potassium 3.7   4.3       PROTEIN TOTAL 6.6   6.8       PT 10.9         RBC 4.64   4.87       RDW 14.6   14.6       Sodium 138   136       WBC 4.29   5.50             All pertinent labs from the last 24 hours have been reviewed.    Significant Diagnostics:  I have reviewed all pertinent imaging results/findings within the past 24 hours.

## 2024-07-23 NOTE — PROGRESS NOTES
[facility] - Neurosurgery ([facility])  Neurosurgery  Progress Note    Subjective:     History of Present Illness: The patient is a very pleasant 58-year-old female with a past medical history of L5-S1 radiculopathy on the right side status post L5-S1 fusion on November 20, 2023 with Dr. Davian Orourke, who presents after a mechanical ground level fall on Saturday.  Patient reports that she fell on Saturday when her legs gave out, and fell on her right side.  She denies back or neck or head trauma.  She denies loss of consciousness.  She states that she was down for almost 24 hours before EMS arrived to take her to the hospital.  Currently, she states that she has shooting pain that travels down the right lower extremity along the right lateral thigh and leg and the plantar aspect of the foot which occurs when she gets muscle spasms.  She also has numbness and tingling in that same distribution.  Reports LLE paresthesias in the calf and plantar aspect of the foot. She reports functional decline for the last 4 months, gait imbalance, and hand clumsiness. She also reports bowel and bladder urgency since her surgery.  She denies any episodes of urinating or stooling on herself without realizing it.  She denies headache, vision/hearing changes, dysphagia/dysphonia, chest pain, abdominal pain, nausea and vomiting, new onset weakness or altered sensorium.     Post-Op Info:  * No surgery found *       Interval History: NAEON. AFVSS. C/o LLE lateral leg pain to the foot. Paresthesias calves and plantar aspect of feet. Gait imbalance, clumsiness, dropping things. All the above sx present for 4 months. States she does not feel like herself and cannot take care of herself. MRI Brain without acute abnormality. MRI L spine with hardware artifact, no severe canal stenosis.     Medications:  Continuous Infusions:   0.9% NaCl   Intravenous Continuous 75 mL/hr at 07/22/24 1454 Rate Verify at 07/22/24 1454     Scheduled Meds:    aluminum-magnesium hydroxide-simethicone  30 mL Oral QID (AC & HS)    citalopram  20 mg Oral Daily    losartan  25 mg Oral Daily    miconazole nitrate 2%   Topical (Top) BID    pantoprazole  40 mg Oral Daily    polyethylene glycol  17 g Oral Daily    pregabalin  100 mg Oral TID    QUEtiapine  100 mg Oral QHS    sucralfate  1 g Oral Q6H     PRN Meds:  Current Facility-Administered Medications:     acetaminophen, 650 mg, Oral, Q6H PRN    albuterol-ipratropium, 3 mL, Nebulization, Q4H PRN    dextrose 10%, 12.5 g, Intravenous, PRN    dextrose 10%, 25 g, Intravenous, PRN    diazePAM, 2.5 mg, Intravenous, On Call Procedure    glucagon (human recombinant), 1 mg, Intramuscular, PRN    glucose, 16 g, Oral, PRN    glucose, 24 g, Oral, PRN    melatonin, 6 mg, Oral, Nightly PRN    morphine, 2 mg, Intravenous, Q4H PRN    naloxone, 0.02 mg, Intravenous, PRN    ondansetron, 4 mg, Intravenous, Q6H PRN    senna-docusate 8.6-50 mg, 2 tablet, Oral, BID PRN    sodium chloride 0.9%, 10 mL, Intravenous, Q12H PRN    traMADoL, 50 mg, Oral, Q12H PRN     Review of Systems  Objective:     Weight: 112.3 kg (247 lb 9.2 oz)  Body mass index is 39.96 kg/m².  Vital Signs (Most Recent):  Temp: 98 °F (36.7 °C) (07/23/24 0805)  Pulse: 75 (07/23/24 0805)  Resp: 20 (07/23/24 0805)  BP: (!) 141/80 (07/23/24 0805)  SpO2: 97 % (07/23/24 0805) Vital Signs (24h Range):  Temp:  [97.9 °F (36.6 °C)-98.9 °F (37.2 °C)] 98 °F (36.7 °C)  Pulse:  [64-83] 75  Resp:  [16-20] 20  SpO2:  [92 %-97 %] 97 %  BP: (119-179)/(64-88) 141/80                                 Physical Exam     Neurosurgery Physical Exam    General: well developed, well nourished, no distress.   Head: normocephalic, atraumatic  Neurologic: Alert and oriented. Thought content appropriate.  GCS: Motor: 6/Verbal: 5/Eyes: 4 GCS Total: 15  Mental Status: Awake, Alert, Oriented x 4  Language: No aphasia  Speech: No dysarthria  Cranial nerves: face symmetric, tongue midline, CN II-XII grossly intact.    Eyes: pupils equal, round, reactive to light with accomodation, EOMI.   Pulmonary: normal respirations, no signs of respiratory distress  Sensory: intact to light touch throughout  Motor Strength: Moves all extremities spontaneously with good tone. No abnormal movements seen. Pain limited weakness right proximal LE.    Strength  Deltoids Triceps Biceps Wrist Extension Wrist Flexion Hand    Upper: R 5/5 5/5 5/5 5/5 5/5 5/5    L 5/5 5/5 5/5 5/5 5/5 5/5     Iliopsoas Quadriceps Knee  Flexion Tibialis  anterior Gastro- cnemius EHL   Lower: R 4+/5 4+/5 4+/5 5/5 5/5 5/5    L 5/5 5/5 5/5 5/5 5/5 5/5     DTR's - 2 + and symmetric in UE and LE  Byrnes: present bilaterally  Clonus: absent  Skin: Skin is warm, dry and intact.  Prior lumbar incision well healed. No surrounding edema.      Significant Labs:  Recent Labs   Lab 07/21/24  1216 07/22/24  1459 07/23/24  0340   * 140* 132*    136 138   K 3.9 4.3 3.7    107 106   CO2 19* 23 24   BUN 11 9 8   CREATININE 0.7 0.7 0.7   CALCIUM 9.2 8.7 8.8   MG  --   --  2.3     Recent Labs   Lab 07/21/24  1216 07/22/24  1459 07/23/24  0340   WBC 6.82 5.50 4.29   HGB 15.8 14.3 14.0   HCT 47.5 45.1 43.0    150 144*     Recent Labs   Lab 07/23/24  0340   INR 1.0     Microbiology Results (last 7 days)       ** No results found for the last 168 hours. **          Recent Lab Results         07/23/24  0340   07/22/24  1459        Albumin 3.0   3.1          132       ALT 70   84       Anion Gap 8   6       AST 62   97       Baso # 0.01   0.02       Basophil % 0.2   0.4       BILIRUBIN TOTAL 0.6  Comment: For infants and newborns, interpretation of results should be based  on gestational age, weight and in agreement with clinical  observations.    Premature Infant recommended reference ranges:  Up to 24 hours.............<8.0 mg/dL  Up to 48 hours............<12.0 mg/dL  3-5 days..................<15.0 mg/dL  6-29 days.................<15.0 mg/dL      0.6  Comment: For infants and newborns, interpretation of results should be based  on gestational age, weight and in agreement with clinical  observations.    Premature Infant recommended reference ranges:  Up to 24 hours.............<8.0 mg/dL  Up to 48 hours............<12.0 mg/dL  3-5 days..................<15.0 mg/dL  6-29 days.................<15.0 mg/dL         BUN 8   9       Calcium 8.8   8.7       Chloride 106   107       CO2 24   23       Creatinine 0.7   0.7       Differential Method Automated   Automated       eGFR >60.0   >60.0       Eos # 0.1   0.0       Eos % 1.4   0.4       Glucose 132   140       Gran # (ANC) 2.3   3.8       Gran % 54.3   68.8       Hematocrit 43.0   45.1       Hemoglobin 14.0   14.3       Immature Grans (Abs) 0.02  Comment: Mild elevation in immature granulocytes is non specific and   can be seen in a variety of conditions including stress response,   acute inflammation, trauma and pregnancy. Correlation with other   laboratory and clinical findings is essential.     0.03  Comment: Mild elevation in immature granulocytes is non specific and   can be seen in a variety of conditions including stress response,   acute inflammation, trauma and pregnancy. Correlation with other   laboratory and clinical findings is essential.         Immature Granulocytes 0.5   0.5       INR 1.0  Comment: Coumadin Therapy:  2.0 - 3.0 for INR for all indicators except mechanical heart valves  and antiphospholipid syndromes which should use 2.5 - 3.5.           Lymph # 1.6   1.3       Lymph % 36.8   23.5       Magnesium  2.3         MCH 30.2   29.4       MCHC 32.6   31.7       MCV 93   93       Mono # 0.3   0.4       Mono % 6.8   6.4       MPV 10.7   10.7       nRBC 0   0       Phosphorus Level 3.0         Platelet Count 144   150       Potassium 3.7   4.3       PROTEIN TOTAL 6.6   6.8       PT 10.9         RBC 4.64   4.87       RDW 14.6   14.6       Sodium 138   136       WBC 4.29   5.50             All  pertinent labs from the last 24 hours have been reviewed.    Significant Diagnostics:  I have reviewed all pertinent imaging results/findings within the past 24 hours.  Assessment/Plan:     Lumbar radiculopathy, chronic  The patient is a very pleasant 58-year-old female with a past medical history of L5-S1 radiculopathy on the right side status post L5-S1 fusion on November 20, 2023 with Dr. Davian Orourke, who presents after a mechanical ground level fall on Saturday with worsening BLE radiculopathy, gait imbalance. NSGY consulted for evaluation.    --Admitted  with q4h neurochecks.  --All labs and diagnostics reviewed.  --MRI Brain, Lsp without acute abnormality.  --XR Lsp flex/ex without instability.  --CT head, entire spine reviewed. DDD. Hardware L5-S1 intact. C6-7 stenosis. No acute fracture.  --Monitor for urinary retention. Bladder scan prn and if patient has not voided in 6 hours.   --Obtain MRI C/T spine for evaluation of chronic myelopathy.  --Further recs pending completed imaging.   --Please page with exam change or questions.    Dispo: ongoing.    Plan d/w Dr. Cook.        Flora Gómez PA-C  Neurosurgery  [facility] - Neurosurgery ([facility])

## 2024-07-23 NOTE — ASSESSMENT & PLAN NOTE
The patient is a very pleasant 58-year-old female with a past medical history of L5-S1 radiculopathy on the right side status post L5-S1 fusion on November 20, 2023 with Dr. Davian Orourke, who presents after a mechanical ground level fall on Saturday with worsening BLE radiculopathy, gait imbalance. NSGY consulted for evaluation.    --Admitted  with q4h neurochecks.  --All labs and diagnostics reviewed.  --MRI Brain, Lsp without acute abnormality.  --XR Lsp flex/ex without instability.  --CT head, entire spine reviewed. DDD. Hardware L5-S1 intact. C6-7 stenosis. No acute fracture.  --Monitor for urinary retention. Bladder scan prn and if patient has not voided in 6 hours.   --Obtain MRI C/T spine for evaluation of chronic myelopathy.  --Further recs pending completed imaging.   --Please page with exam change or questions.    Dispo: ongoing.    Plan d/w Dr. Cook.

## 2024-07-24 PROBLEM — D69.6 THROMBOCYTOPENIA: Status: ACTIVE | Noted: 2024-07-24

## 2024-07-24 LAB
ALBUMIN SERPL BCP-MCNC: 2.9 G/DL (ref 3.5–5.2)
ALP SERPL-CCNC: 136 U/L (ref 55–135)
ALT SERPL W/O P-5'-P-CCNC: 61 U/L (ref 10–44)
ANION GAP SERPL CALC-SCNC: 7 MMOL/L (ref 8–16)
AST SERPL-CCNC: 46 U/L (ref 10–40)
BASOPHILS # BLD AUTO: 0.03 K/UL (ref 0–0.2)
BASOPHILS NFR BLD: 0.6 % (ref 0–1.9)
BILIRUB SERPL-MCNC: 0.3 MG/DL (ref 0.1–1)
BUN SERPL-MCNC: 12 MG/DL (ref 6–20)
CALCIUM SERPL-MCNC: 8.7 MG/DL (ref 8.7–10.5)
CHLORIDE SERPL-SCNC: 103 MMOL/L (ref 95–110)
CO2 SERPL-SCNC: 27 MMOL/L (ref 23–29)
CREAT SERPL-MCNC: 0.8 MG/DL (ref 0.5–1.4)
DIFFERENTIAL METHOD BLD: ABNORMAL
EOSINOPHIL # BLD AUTO: 0.1 K/UL (ref 0–0.5)
EOSINOPHIL NFR BLD: 1.3 % (ref 0–8)
ERYTHROCYTE [DISTWIDTH] IN BLOOD BY AUTOMATED COUNT: 14.2 % (ref 11.5–14.5)
EST. GFR  (NO RACE VARIABLE): >60 ML/MIN/1.73 M^2
GLUCOSE SERPL-MCNC: 126 MG/DL (ref 70–110)
HCT VFR BLD AUTO: 43 % (ref 37–48.5)
HGB BLD-MCNC: 13.9 G/DL (ref 12–16)
IMM GRANULOCYTES # BLD AUTO: 0.01 K/UL (ref 0–0.04)
IMM GRANULOCYTES NFR BLD AUTO: 0.2 % (ref 0–0.5)
LYMPHOCYTES # BLD AUTO: 1.6 K/UL (ref 1–4.8)
LYMPHOCYTES NFR BLD: 31.2 % (ref 18–48)
MAGNESIUM SERPL-MCNC: 2.4 MG/DL (ref 1.6–2.6)
MCH RBC QN AUTO: 30 PG (ref 27–31)
MCHC RBC AUTO-ENTMCNC: 32.3 G/DL (ref 32–36)
MCV RBC AUTO: 93 FL (ref 82–98)
MONOCYTES # BLD AUTO: 0.4 K/UL (ref 0.3–1)
MONOCYTES NFR BLD: 8 % (ref 4–15)
NEUTROPHILS # BLD AUTO: 3.1 K/UL (ref 1.8–7.7)
NEUTROPHILS NFR BLD: 58.7 % (ref 38–73)
NRBC BLD-RTO: 0 /100 WBC
PHOSPHATE SERPL-MCNC: 3.2 MG/DL (ref 2.7–4.5)
PLATELET # BLD AUTO: 145 K/UL (ref 150–450)
PMV BLD AUTO: 10.4 FL (ref 9.2–12.9)
POCT GLUCOSE: 112 MG/DL (ref 70–110)
POCT GLUCOSE: 145 MG/DL (ref 70–110)
POTASSIUM SERPL-SCNC: 3.7 MMOL/L (ref 3.5–5.1)
PROT SERPL-MCNC: 6.3 G/DL (ref 6–8.4)
RBC # BLD AUTO: 4.64 M/UL (ref 4–5.4)
SODIUM SERPL-SCNC: 137 MMOL/L (ref 136–145)
WBC # BLD AUTO: 5.22 K/UL (ref 3.9–12.7)

## 2024-07-24 PROCEDURE — 11000001 HC ACUTE MED/SURG PRIVATE ROOM

## 2024-07-24 PROCEDURE — 83735 ASSAY OF MAGNESIUM: CPT | Performed by: HOSPITALIST

## 2024-07-24 PROCEDURE — 36415 COLL VENOUS BLD VENIPUNCTURE: CPT | Performed by: HOSPITALIST

## 2024-07-24 PROCEDURE — 97166 OT EVAL MOD COMPLEX 45 MIN: CPT

## 2024-07-24 PROCEDURE — 97530 THERAPEUTIC ACTIVITIES: CPT

## 2024-07-24 PROCEDURE — 80053 COMPREHEN METABOLIC PANEL: CPT | Performed by: HOSPITALIST

## 2024-07-24 PROCEDURE — 63600175 PHARM REV CODE 636 W HCPCS: Mod: UD | Performed by: HOSPITALIST

## 2024-07-24 PROCEDURE — 84100 ASSAY OF PHOSPHORUS: CPT | Performed by: HOSPITALIST

## 2024-07-24 PROCEDURE — 63600175 PHARM REV CODE 636 W HCPCS: Performed by: HOSPITALIST

## 2024-07-24 PROCEDURE — 99232 SBSQ HOSP IP/OBS MODERATE 35: CPT | Mod: ,,, | Performed by: PHYSICIAN ASSISTANT

## 2024-07-24 PROCEDURE — 97162 PT EVAL MOD COMPLEX 30 MIN: CPT

## 2024-07-24 PROCEDURE — 97116 GAIT TRAINING THERAPY: CPT

## 2024-07-24 PROCEDURE — 85025 COMPLETE CBC W/AUTO DIFF WBC: CPT | Performed by: HOSPITALIST

## 2024-07-24 PROCEDURE — 25000003 PHARM REV CODE 250: Performed by: HOSPITALIST

## 2024-07-24 PROCEDURE — 97535 SELF CARE MNGMENT TRAINING: CPT

## 2024-07-24 RX ADMIN — QUETIAPINE FUMARATE 100 MG: 25 TABLET ORAL at 08:07

## 2024-07-24 RX ADMIN — Medication 6 MG: at 08:07

## 2024-07-24 RX ADMIN — DIAZEPAM 2.5 MG: 10 INJECTION, SOLUTION INTRAMUSCULAR; INTRAVENOUS at 02:07

## 2024-07-24 RX ADMIN — ALUMINUM HYDROXIDE, MAGNESIUM HYDROXIDE, AND SIMETHICONE 30 ML: 1200; 120; 1200 SUSPENSION ORAL at 11:07

## 2024-07-24 RX ADMIN — LOSARTAN POTASSIUM 25 MG: 25 TABLET, FILM COATED ORAL at 08:07

## 2024-07-24 RX ADMIN — CITALOPRAM HYDROBROMIDE 20 MG: 20 TABLET ORAL at 08:07

## 2024-07-24 RX ADMIN — MICONAZOLE NITRATE: 20 OINTMENT TOPICAL at 09:07

## 2024-07-24 RX ADMIN — MICONAZOLE NITRATE: 20 OINTMENT TOPICAL at 08:07

## 2024-07-24 RX ADMIN — ALUMINUM HYDROXIDE, MAGNESIUM HYDROXIDE, AND SIMETHICONE 30 ML: 1200; 120; 1200 SUSPENSION ORAL at 08:07

## 2024-07-24 RX ADMIN — PREGABALIN 100 MG: 50 CAPSULE ORAL at 08:07

## 2024-07-24 RX ADMIN — ALUMINUM HYDROXIDE, MAGNESIUM HYDROXIDE, AND SIMETHICONE 30 ML: 1200; 120; 1200 SUSPENSION ORAL at 05:07

## 2024-07-24 RX ADMIN — TRAMADOL HYDROCHLORIDE 50 MG: 50 TABLET, COATED ORAL at 08:07

## 2024-07-24 RX ADMIN — MORPHINE SULFATE 2 MG: 2 INJECTION, SOLUTION INTRAMUSCULAR; INTRAVENOUS at 05:07

## 2024-07-24 RX ADMIN — PREGABALIN 100 MG: 50 CAPSULE ORAL at 03:07

## 2024-07-24 RX ADMIN — SUCRALFATE 1 G: 1 SUSPENSION ORAL at 05:07

## 2024-07-24 RX ADMIN — PANTOPRAZOLE SODIUM 40 MG: 40 TABLET, DELAYED RELEASE ORAL at 08:07

## 2024-07-24 RX ADMIN — SUCRALFATE 1 G: 1 SUSPENSION ORAL at 12:07

## 2024-07-24 RX ADMIN — MORPHINE SULFATE 2 MG: 2 INJECTION, SOLUTION INTRAMUSCULAR; INTRAVENOUS at 10:07

## 2024-07-24 RX ADMIN — POLYETHYLENE GLYCOL 3350 17 G: 17 POWDER, FOR SOLUTION ORAL at 08:07

## 2024-07-24 NOTE — SUBJECTIVE & OBJECTIVE
Interval History:  Patient reports no significant changes since yesterday.  No new or worsening chest pain no shortness a breath.  MRI brain unremarkable.  MRI L-spine pending.    Review of Systems  Objective:     Vital Signs (Most Recent):  Temp: 97.9 °F (36.6 °C) (07/24/24 0410)  Pulse: 63 (07/24/24 0410)  Resp: 18 (07/24/24 0544)  BP: 128/68 (07/24/24 0410)  SpO2: 95 % (07/24/24 0410) Vital Signs (24h Range):  Temp:  [97.9 °F (36.6 °C)-98.3 °F (36.8 °C)] 97.9 °F (36.6 °C)  Pulse:  [55-78] 63  Resp:  [16-20] 18  SpO2:  [92 %-98 %] 95 %  BP: (128-163)/(58-91) 128/68     Weight: 112.3 kg (247 lb 9.2 oz)  Body mass index is 39.96 kg/m².    Intake/Output Summary (Last 24 hours) at 7/24/2024 0757  Last data filed at 7/24/2024 0602  Gross per 24 hour   Intake --   Output 600 ml   Net -600 ml         Physical Exam      Clear lungs bilaterally, unlabored breathing,  no cyanosis   Heart sounds indicate a regular rate and rhythm  Awake alert, no acute distress   No obvious upper nor lower extremity edema   4/5 hip flexor strength on the right, 5/5 hip flexor strength on the left   5/5 proximal upper extremity strength bilaterally including fist    No facial droop, no slurred speech

## 2024-07-24 NOTE — SUBJECTIVE & OBJECTIVE
Interval History: NAEON. Exam stable. Pending MRI C/T to make further recommendations. Participated in PT/OT today.    Medications:  Continuous Infusions:   0.9% NaCl   Intravenous Continuous 75 mL/hr at 07/22/24 1454 Rate Verify at 07/22/24 1454     Scheduled Meds:   aluminum-magnesium hydroxide-simethicone  30 mL Oral QID (AC & HS)    citalopram  20 mg Oral Daily    losartan  25 mg Oral Daily    miconazole nitrate 2%   Topical (Top) BID    pantoprazole  40 mg Oral Daily    polyethylene glycol  17 g Oral Daily    pregabalin  100 mg Oral TID    QUEtiapine  100 mg Oral QHS    sucralfate  1 g Oral Q6H     PRN Meds:  Current Facility-Administered Medications:     acetaminophen, 650 mg, Oral, Q6H PRN    albuterol-ipratropium, 3 mL, Nebulization, Q4H PRN    dextrose 10%, 12.5 g, Intravenous, PRN    dextrose 10%, 25 g, Intravenous, PRN    diazePAM, 2.5 mg, Intravenous, On Call Procedure    glucagon (human recombinant), 1 mg, Intramuscular, PRN    glucose, 16 g, Oral, PRN    glucose, 24 g, Oral, PRN    melatonin, 6 mg, Oral, Nightly PRN    morphine, 2 mg, Intravenous, Q4H PRN    naloxone, 0.02 mg, Intravenous, PRN    ondansetron, 4 mg, Intravenous, Q6H PRN    senna-docusate 8.6-50 mg, 2 tablet, Oral, BID PRN    sodium chloride 0.9%, 10 mL, Intravenous, Q12H PRN    traMADoL, 50 mg, Oral, Q12H PRN     Review of Systems  Objective:     Weight: 112.3 kg (247 lb 9.2 oz)  Body mass index is 39.96 kg/m².  Vital Signs (Most Recent):  Temp: 98.8 °F (37.1 °C) (07/24/24 1149)  Pulse: 68 (07/24/24 1149)  Resp: 18 (07/24/24 1149)  BP: (!) 145/81 (07/24/24 1149)  SpO2: 95 % (07/24/24 1149) Vital Signs (24h Range):  Temp:  [97.3 °F (36.3 °C)-98.8 °F (37.1 °C)] 98.8 °F (37.1 °C)  Pulse:  [55-78] 68  Resp:  [16-20] 18  SpO2:  [92 %-98 %] 95 %  BP: (120-163)/(58-91) 145/81     Date 07/24/24 0700 - 07/25/24 0659   Shift 7934-2908 0175-5042 6260-0569 24 Hour Total   INTAKE   Shift Total(mL/kg)       OUTPUT   Urine(mL/kg/hr) 750   750   Shift  Total(mL/kg) 750(6.7)   750(6.7)   Weight (kg) 112.3 112.3 112.3 112.3                               Physical Exam     Neurosurgery Physical Exam    General: well developed, well nourished, no distress.   Head: normocephalic, atraumatic  Neurologic: Alert and oriented. Thought content appropriate.  GCS: Motor: 6/Verbal: 5/Eyes: 4 GCS Total: 15  Mental Status: Awake, Alert, Oriented x 4  Language: No aphasia  Speech: No dysarthria  Cranial nerves: face symmetric, tongue midline, CN II-XII grossly intact.   Eyes: pupils equal, round, reactive to light with accomodation, EOMI.   Pulmonary: normal respirations, no signs of respiratory distress  Sensory: intact to light touch throughout  Motor Strength: Moves all extremities spontaneously with good tone. No abnormal movements seen. Pain limited weakness right proximal LE.    Strength  Deltoids Triceps Biceps Wrist Extension Wrist Flexion Hand    Upper: R 5/5 5/5 5/5 5/5 5/5 5/5    L 5/5 5/5 5/5 5/5 5/5 5/5     Iliopsoas Quadriceps Knee  Flexion Tibialis  anterior Gastro- cnemius EHL   Lower: R 4+/5 4+/5 4+/5 5/5 5/5 5/5    L 5/5 5/5 5/5 5/5 5/5 5/5     DTR's - 2 + and symmetric in UE and LE  Byrnes: present bilaterally  Clonus: absent  Skin: Skin is warm, dry and intact.  Prior lumbar incision well healed. No surrounding edema.      Significant Labs:  Recent Labs   Lab 07/22/24  1459 07/23/24  0340 07/24/24  0403   * 132* 126*    138 137   K 4.3 3.7 3.7    106 103   CO2 23 24 27   BUN 9 8 12   CREATININE 0.7 0.7 0.8   CALCIUM 8.7 8.8 8.7   MG  --  2.3 2.4     Recent Labs   Lab 07/22/24  1459 07/23/24  0340 07/24/24  0403   WBC 5.50 4.29 5.22   HGB 14.3 14.0 13.9   HCT 45.1 43.0 43.0    144* 145*     Recent Labs   Lab 07/23/24  0340   INR 1.0     Microbiology Results (last 7 days)       ** No results found for the last 168 hours. **          Recent Lab Results         07/24/24  1158   07/24/24  0403        Albumin   2.9       ALP   136        ALT   61       Anion Gap   7       AST   46       Baso #   0.03       Basophil %   0.6       BILIRUBIN TOTAL   0.3  Comment: For infants and newborns, interpretation of results should be based  on gestational age, weight and in agreement with clinical  observations.    Premature Infant recommended reference ranges:  Up to 24 hours.............<8.0 mg/dL  Up to 48 hours............<12.0 mg/dL  3-5 days..................<15.0 mg/dL  6-29 days.................<15.0 mg/dL         BUN   12       Calcium   8.7       Chloride   103       CO2   27       Creatinine   0.8       Differential Method   Automated       eGFR   >60.0       Eos #   0.1       Eos %   1.3       Glucose   126       Gran # (ANC)   3.1       Gran %   58.7       Hematocrit   43.0       Hemoglobin   13.9       Immature Grans (Abs)   0.01  Comment: Mild elevation in immature granulocytes is non specific and   can be seen in a variety of conditions including stress response,   acute inflammation, trauma and pregnancy. Correlation with other   laboratory and clinical findings is essential.         Immature Granulocytes   0.2       Lymph #   1.6       Lymph %   31.2       Magnesium    2.4       MCH   30.0       MCHC   32.3       MCV   93       Mono #   0.4       Mono %   8.0       MPV   10.4       nRBC   0       Phosphorus Level   3.2       Platelet Count   145       POCT Glucose 145         Potassium   3.7       PROTEIN TOTAL   6.3       RBC   4.64       RDW   14.2       Sodium   137       WBC   5.22             All pertinent labs from the last 24 hours have been reviewed.    Significant Diagnostics:  I have reviewed all pertinent imaging results/findings within the past 24 hours.

## 2024-07-24 NOTE — PT/OT/SLP EVAL
"Occupational Therapy  Co- Evaluation & Co-Treatment    Name: Dinora Anderson  MRN: [id number]  Admitting Diagnosis: Central stenosis of spinal canal  Recent Surgery: * No surgery found *      Recommendations:     Discharge Recommendations: High Intensity Therapy  Discharge Equipment Recommendations:  bedside commode, walker, rolling, hip kit, bath bench  Barriers to discharge:  None    Assessment:     Dinora Anderson is a 58 y.o. female with a medical diagnosis of Central stenosis of spinal canal.  She presents with significant fear of falling after being down for 24 hours. Patient reports significant recent difficulty with LB dressing and reports that without assistance she has been struggling to complete this whatever way possible. Patient agreeable and pleasant throughout evaluation but anxious and with fear of falling. Performance deficits affecting function: weakness, impaired endurance, impaired self care skills, impaired functional mobility, gait instability, impaired balance, decreased lower extremity function, decreased upper extremity function, impaired sensation, pain.  Patient with reports of 3 falls since surgery in November 2023 and below her independent PLOF. Patient presents with good participation and motivation to return to prior level of function with high intensity therapy.  The patient demonstrates appropriate fine motor control to participate in up to 3 hours or 15hrs of combined therapy post acute.     Rehab Prognosis: Good; patient would benefit from acute skilled OT services to address these deficits and reach maximum level of function.       Plan:     Patient to be seen 4 x/week to address the above listed problems via self-care/home management, therapeutic activities, therapeutic exercises, neuromuscular re-education  Plan of Care Expires: 08/24/24  Plan of Care Reviewed with: patient    Subjective     Chief Complaint: "I fell on the floor and couldn't get my fat ass up"   Patient/Family " "Comments/goals: "how are you girls going to hold me up? I'm scared I'm going to fall"    Occupational Profile:  Living Environment: lives with their brother in a mobile home with 5 steps to enter with left handrail. Bathroom set up: bathtub shower combo  with Shower chair  Previous level of function: Patient reports recent increased difficulty with LB dressing and ambulation; she reports IADLs are extremely difficulty due to difficulty standing; endorses 4 falls wince fusion surgery in Nov 2023 of which she was independent prior; ambulatory with rollator; bowel/bladder urgency  Roles and Routines: not working  Equipment Used at Home: rollator  Assistance upon Discharge: reports brother who lives with her is primarily offshore; reports no A available    Pain/Comfort:  Pain Rating 1: 2/10  Location 1:  (back extending distal to R leg)  Pain Addressed 1: Pre-medicate for activity, Distraction  Pain Rating Post-Intervention 1: 2/10    Patients cultural, spiritual, Alevism conflicts given the current situation: no    Objective:   Co-evaluation and treatment necessary due to patient's medical complexity requiring two skilled therapists for patient safety, activity tolerance, and appropriate progression towards functional goals.      Communicated with: nursing prior to session.  Patient found HOB elevated with PureWick, oxygen upon OT entry to room.    General Precautions: Standard, fall  Orthopedic Precautions: N/A  Braces: N/A  Respiratory Status: Nasal cannula, flow 2 L/min    Occupational Performance:    Bed Mobility:  utilization of spinal precautions as precautionary until MRI released  Patient completed Rolling/Turning to Left with  minimum assistance  Patient completed Supine to Sit with minimum assistance    Functional Mobility/Transfers: Gait belt utilized during session for patient safety  Patient completed Sit <> Stand Transfer with contact guard assistance  with  rolling walker x3 reps; patient requiring " min A on fourth rep due to fatigue  Functional Mobility: Patient ambulated few steps with min A and RW; significant cueing required and chair follow for safety  Noted heavy UE support on RW to offload weight    Activities of Daily Living:  Feeding:  supervision patient able to manipulate objects  Grooming: minimum assistance for standing balance; patient able to complete all B tasks with good sequencing  Upper Body Dressing: contact guard assistance to don gown like jacket  Toileting: total assistance via [place]    Cognitive/Visual Perceptual:  Cognitive/Psychosocial Skills:     -       Oriented to: Person, Place, Time, and Situation   -       Follows Commands/attention:Follows multistep  commands  -       Communication: clear/fluent  -       Memory: No Deficits noted  -       Safety awareness/insight to disability: intact   -       Mood/Affect/Coping skills/emotional control: Appropriate to situation  Visual/Perceptual:      -Intact      Physical Exam:  Sensation:    -       Intact  Dominant hand:    -       R  Upper Extremity Range of Motion:  -       Right Upper Extremity: WFL  -       Left Upper Extremity: WFL  Upper Extremity Strength:    -       Right Upper Extremity: WFL  -       Left Upper Extremity: WFL   Strength:    -       Right Upper Extremity: WFL  -       Left Upper Extremity: WFL  Fine Motor Coordination: -       Intact per finger opposition and skilled observation of tasks    AMPA 6 Click ADL:  AMPAC Total Score: 17    Treatment & Education:    Patient educated on:   -purpose of OT and OT POC  -facilitation and education on proper body mechanics, energy conservation, and safety  -importance of early mobility and out of bed activities with staff assist  -overall benefits of therapy     All questions answered within OT scope and to patient's satisfaction    Patient left up in chair with all lines intact, call button in reach, chair alarm on, and nursing notified    GOALS:   Multidisciplinary  Problems       Occupational Therapy Goals          Problem: Occupational Therapy    Goal Priority Disciplines Outcome Interventions   Occupational Therapy Goal     OT, PT/OT Progressing    Description: Goals to be met by: 8/24/24     Patient will increase functional independence with ADLs by performing:    LE Dressing with Supervision with AD if needed.  Grooming while standing at sink with Stand-by Assistance.  Toileting from toilet with Contact Guard Assistance for hygiene and clothing management.   Supine to sit with Stand-by Assistance.  Toilet transfer to toilet with Contact Guard Assistance.    DME Justifications (see above for complete DME recommendations)    Bedside Commode- Patient has a mobility limitation that significantly impairs their ability to participate in one or more mobility related activities of daily living, including toileting. This deficit can be resolved by using a bedside commode. Patient demonstrates mobility limitations that will cause them to be confined to one room at home without bathroom access for up to 30 days. Using a bedside commode will greatly improve the patient's ability to participate in MRADLs.     Rolling Walker- Patient demonstrates a mobility limitation that significantly impairs their ability to participate in one or more mobility related activities of daily living. Patient's mobility limitation cannot be sufficiently resolved with the use of a cane, but can be sufficiently resolved with the use of a rolling walker.The use of a rolling walker will considerably improve their ability to participate in MRADLs. Patient will use the walker on a regular basis at home.                             History:     Past Medical History:   Diagnosis Date    Bipolar 1 disorder     Bipolar 1 disorder     bipolar c schizophrenia and ptsd    Depression     Polysubstance abuse     PTSD (post-traumatic stress disorder)     Schizoaffective disorder     Schizophrenia          Past Surgical  History:   Procedure Laterality Date    TUBAL LIGATION         Time Tracking:     OT Date of Treatment: 07/24/24  OT Start Time: 1059  OT Stop Time: 1140  OT Total Time (min): 41 min    Billable Minutes:Evaluation 10  Self Care/Home Management 20  Therapeutic Activity 11    7/24/2024

## 2024-07-24 NOTE — PROGRESS NOTES
[facility] ([facility])  [facility]  Progress Note    Patient Name: Dinora Anderson  MRN: [id number]  Patient Class: IP- Inpatient   Admission Date: 7/22/2024  Length of Stay: 2 days  Attending Physician: Cristo Beaulieu MD  Primary Care Provider: Monroe Ly MD        Subjective:     Principal Problem:Central stenosis of spinal canal        HPI:  58-year-old white female admitted as a transfer from [facility] for possible cauda equina syndrome.  Patient reports being in her usual state of health sometime up until 2 days ago when she had a fall at home landing on her right side.  She thinks she fell by simply tripping on her right foot which has been dragging for some time but she is not completely sure.  She does not recall having any dizziness prior to the fall but may have been dizzy either during and/or after the fall.  Prior to the fall there was no nausea headaches or vision changes.  No chest pains or shortness a breath.  She reports being unable to have the strength to get up and she just laid on the floor until Sunday morning when her neighbor came in and called EMS.  She reports having an impaired gait since her surgery in November of last year.  She also reports having worsening weakness and upper and lower extremities in the last few weeks.  She does report having chronic tingling sensation in the right lower extremity.  Patient reports having ongoing urge incontinence for both urine and stool that has been present since the last surgery.  She affirms having a sensation of urge but is unable to hold relieving herself before getting to the restroom.  Reports having frequent accidents.    Denies any history of cancer but does affirm currently smoking.  Denies any history of stroke or CAD.    OSH CT of the lumbar spine demonstrates aforementioned hardware in satisfactory position. A CT of her head does demonstrate an area of hypoattenuation in the left frontal lobe,  likely representing vasogenic edema. CT c spine read as  C5-C6 severe spinal canal stenosis. Tried to undergo MRI  but was unable to do so due to claustrophobia.  Transferred here to due concerns for cauda equina and NSGY eval.     Overview/Hospital Course:  No notes on file    Interval History:  Patient reports no significant changes since yesterday.  No new or worsening chest pain no shortness a breath.  MRI brain unremarkable.  MRI L-spine pending.    Review of Systems  Objective:     Vital Signs (Most Recent):  Temp: 97.9 °F (36.6 °C) (07/24/24 0410)  Pulse: 63 (07/24/24 0410)  Resp: 18 (07/24/24 0544)  BP: 128/68 (07/24/24 0410)  SpO2: 95 % (07/24/24 0410) Vital Signs (24h Range):  Temp:  [97.9 °F (36.6 °C)-98.3 °F (36.8 °C)] 97.9 °F (36.6 °C)  Pulse:  [55-78] 63  Resp:  [16-20] 18  SpO2:  [92 %-98 %] 95 %  BP: (128-163)/(58-91) 128/68     Weight: 112.3 kg (247 lb 9.2 oz)  Body mass index is 39.96 kg/m².    Intake/Output Summary (Last 24 hours) at 7/24/2024 0754  Last data filed at 7/24/2024 0602  Gross per 24 hour   Intake --   Output 600 ml   Net -600 ml         Physical Exam      Clear lungs bilaterally, unlabored breathing,  no cyanosis   Heart sounds indicate a regular rate and rhythm  Awake alert, no acute distress   No obvious upper nor lower extremity edema   4/5 hip flexor strength on the right, 5/5 hip flexor strength on the left   5/5 proximal upper extremity strength bilaterally including fist    No facial droop, no slurred speech         Assessment/Plan:      * Central stenosis of spinal canal  Transferred for evaluation of cauda equina syndrome   Noted on CT C-spine  History of L5-S1 right-sided fusion  MRI CT L-spine pending   MRI C-spine pending per Neurosurgery      Brain lesion  Noted on CT head showing Possible hypodensity of the left frontal lobe may represent cerebral edema or infarct   Neuro checks   MRI brain unremarkable, suspect CT finding is artifact      Fall  Ground level  fall  Unclear of mechanical vs neurogenic, treat underlying issues, see above  Pt/ot  Neurochecks        Lumbar radiculopathy, chronic  Lyrica      Essential hypertension  Losartan    Bipolar 1 disorder, depressed, moderate  Seroquel  lexapro        VTE Risk Mitigation (From admission, onward)           Ordered     IP VTE HIGH RISK PATIENT  Once         07/22/24 0651     Place sequential compression device  Until discontinued         07/22/24 0651                    Discharge Planning   GLENNA: 7/24/2024     Code Status: Full Code   Is the patient medically ready for discharge?:     Reason for patient still in hospital (select all that apply): Patient trending condition, Treatment, and Consult recommendations  Discharge Plan A: Rehab                  Cristo Beaulieu MD  Department of Hospital Medicine   [facility] - Neurosurgery ([facility])

## 2024-07-24 NOTE — PROGRESS NOTES
[facility] ([facility])  Neurosurgery  Progress Note    Subjective:     History of Present Illness: The patient is a very pleasant 58-year-old female with a past medical history of L5-S1 radiculopathy on the right side status post L5-S1 fusion on November 20, 2023 with Dr. Davian Orourke, who presents after a mechanical ground level fall on Saturday.  Patient reports that she fell on Saturday when her legs gave out, and fell on her right side.  She denies back or neck or head trauma.  She denies loss of consciousness.  She states that she was down for almost 24 hours before EMS arrived to take her to the hospital.  Currently, she states that she has shooting pain that travels down the right lower extremity along the right lateral thigh and leg and the plantar aspect of the foot which occurs when she gets muscle spasms.  She also has numbness and tingling in that same distribution.  Reports LLE paresthesias in the calf and plantar aspect of the foot. She reports functional decline for the last 4 months, gait imbalance, and hand clumsiness. She also reports bowel and bladder urgency since her surgery.  She denies any episodes of urinating or stooling on herself without realizing it.  She denies headache, vision/hearing changes, dysphagia/dysphonia, chest pain, abdominal pain, nausea and vomiting, new onset weakness or altered sensorium.     Post-Op Info:  * No surgery found *       Interval History: NAEON. Exam stable. Pending MRI C/T to make further recommendations. Participated in PT/OT today.    Medications:  Continuous Infusions:   0.9% NaCl   Intravenous Continuous 75 mL/hr at 07/22/24 1454 Rate Verify at 07/22/24 1454     Scheduled Meds:   aluminum-magnesium hydroxide-simethicone  30 mL Oral QID (AC & HS)    citalopram  20 mg Oral Daily    losartan  25 mg Oral Daily    miconazole nitrate 2%   Topical (Top) BID    pantoprazole  40 mg Oral Daily    polyethylene glycol  17 g Oral Daily    pregabalin  100 mg  Oral TID    QUEtiapine  100 mg Oral QHS    sucralfate  1 g Oral Q6H     PRN Meds:  Current Facility-Administered Medications:     acetaminophen, 650 mg, Oral, Q6H PRN    albuterol-ipratropium, 3 mL, Nebulization, Q4H PRN    dextrose 10%, 12.5 g, Intravenous, PRN    dextrose 10%, 25 g, Intravenous, PRN    diazePAM, 2.5 mg, Intravenous, On Call Procedure    glucagon (human recombinant), 1 mg, Intramuscular, PRN    glucose, 16 g, Oral, PRN    glucose, 24 g, Oral, PRN    melatonin, 6 mg, Oral, Nightly PRN    morphine, 2 mg, Intravenous, Q4H PRN    naloxone, 0.02 mg, Intravenous, PRN    ondansetron, 4 mg, Intravenous, Q6H PRN    senna-docusate 8.6-50 mg, 2 tablet, Oral, BID PRN    sodium chloride 0.9%, 10 mL, Intravenous, Q12H PRN    traMADoL, 50 mg, Oral, Q12H PRN     Review of Systems  Objective:     Weight: 112.3 kg (247 lb 9.2 oz)  Body mass index is 39.96 kg/m².  Vital Signs (Most Recent):  Temp: 98.8 °F (37.1 °C) (07/24/24 1149)  Pulse: 68 (07/24/24 1149)  Resp: 18 (07/24/24 1149)  BP: (!) 145/81 (07/24/24 1149)  SpO2: 95 % (07/24/24 1149) Vital Signs (24h Range):  Temp:  [97.3 °F (36.3 °C)-98.8 °F (37.1 °C)] 98.8 °F (37.1 °C)  Pulse:  [55-78] 68  Resp:  [16-20] 18  SpO2:  [92 %-98 %] 95 %  BP: (120-163)/(58-91) 145/81     Date 07/24/24 0700 - 07/25/24 0659   Shift 6404-2992 6631-6723 1424-8118 24 Hour Total   INTAKE   Shift Total(mL/kg)       OUTPUT   Urine(mL/kg/hr) 750   750   Shift Total(mL/kg) 750(6.7)   750(6.7)   Weight (kg) 112.3 112.3 112.3 112.3                               Physical Exam     Neurosurgery Physical Exam    General: well developed, well nourished, no distress.   Head: normocephalic, atraumatic  Neurologic: Alert and oriented. Thought content appropriate.  GCS: Motor: 6/Verbal: 5/Eyes: 4 GCS Total: 15  Mental Status: Awake, Alert, Oriented x 4  Language: No aphasia  Speech: No dysarthria  Cranial nerves: face symmetric, tongue midline, CN II-XII grossly intact.   Eyes: pupils equal, round,  reactive to light with accomodation, EOMI.   Pulmonary: normal respirations, no signs of respiratory distress  Sensory: intact to light touch throughout  Motor Strength: Moves all extremities spontaneously with good tone. No abnormal movements seen. Pain limited weakness right proximal LE.    Strength  Deltoids Triceps Biceps Wrist Extension Wrist Flexion Hand    Upper: R 5/5 5/5 5/5 5/5 5/5 5/5    L 5/5 5/5 5/5 5/5 5/5 5/5     Iliopsoas Quadriceps Knee  Flexion Tibialis  anterior Gastro- cnemius EHL   Lower: R 4+/5 4+/5 4+/5 5/5 5/5 5/5    L 5/5 5/5 5/5 5/5 5/5 5/5     DTR's - 2 + and symmetric in UE and LE  Byrnes: present bilaterally  Clonus: absent  Skin: Skin is warm, dry and intact.  Prior lumbar incision well healed. No surrounding edema.      Significant Labs:  Recent Labs   Lab 07/22/24  1459 07/23/24  0340 07/24/24  0403   * 132* 126*    138 137   K 4.3 3.7 3.7    106 103   CO2 23 24 27   BUN 9 8 12   CREATININE 0.7 0.7 0.8   CALCIUM 8.7 8.8 8.7   MG  --  2.3 2.4     Recent Labs   Lab 07/22/24  1459 07/23/24  0340 07/24/24  0403   WBC 5.50 4.29 5.22   HGB 14.3 14.0 13.9   HCT 45.1 43.0 43.0    144* 145*     Recent Labs   Lab 07/23/24  0340   INR 1.0     Microbiology Results (last 7 days)       ** No results found for the last 168 hours. **          Recent Lab Results         07/24/24  1158   07/24/24  0403        Albumin   2.9       ALP   136       ALT   61       Anion Gap   7       AST   46       Baso #   0.03       Basophil %   0.6       BILIRUBIN TOTAL   0.3  Comment: For infants and newborns, interpretation of results should be based  on gestational age, weight and in agreement with clinical  observations.    Premature Infant recommended reference ranges:  Up to 24 hours.............<8.0 mg/dL  Up to 48 hours............<12.0 mg/dL  3-5 days..................<15.0 mg/dL  6-29 days.................<15.0 mg/dL         BUN   12       Calcium   8.7       Chloride   103        CO2   27       Creatinine   0.8       Differential Method   Automated       eGFR   >60.0       Eos #   0.1       Eos %   1.3       Glucose   126       Gran # (ANC)   3.1       Gran %   58.7       Hematocrit   43.0       Hemoglobin   13.9       Immature Grans (Abs)   0.01  Comment: Mild elevation in immature granulocytes is non specific and   can be seen in a variety of conditions including stress response,   acute inflammation, trauma and pregnancy. Correlation with other   laboratory and clinical findings is essential.         Immature Granulocytes   0.2       Lymph #   1.6       Lymph %   31.2       Magnesium    2.4       MCH   30.0       MCHC   32.3       MCV   93       Mono #   0.4       Mono %   8.0       MPV   10.4       nRBC   0       Phosphorus Level   3.2       Platelet Count   145       POCT Glucose 145         Potassium   3.7       PROTEIN TOTAL   6.3       RBC   4.64       RDW   14.2       Sodium   137       WBC   5.22             All pertinent labs from the last 24 hours have been reviewed.    Significant Diagnostics:  I have reviewed all pertinent imaging results/findings within the past 24 hours.  Assessment/Plan:     Lumbar radiculopathy, chronic  The patient is a very pleasant 58-year-old female with a past medical history of L5-S1 radiculopathy on the right side status post L5-S1 fusion on November 20, 2023 with Dr. Davian Orourke, who presents after a mechanical ground level fall on Saturday with worsening BLE radiculopathy, gait imbalance. NSGY consulted for evaluation.    --Admitted  with q4h neurochecks.  --All labs and diagnostics reviewed.  --MRI Brain, Lsp without acute abnormality.  --XR Lsp flex/ex without instability.  --CT head, entire spine reviewed. DDD. Hardware L5-S1 intact. C6-7 stenosis. No acute fracture.  --Monitor for urinary retention. Bladder scan prn and if patient has not voided in 6 hours.   --Obtain MRI C/T spine for evaluation of chronic myelopathy (ordered  7/23).  --Further recs pending completed imaging.   --Please page with exam change or questions.    Dispo: ongoing.    Plan d/w Dr. Cook.        Flora Gómez PA-C  Neurosurgery  [facility] ([facility])

## 2024-07-24 NOTE — ASSESSMENT & PLAN NOTE
Transferred for evaluation of cauda equina syndrome   Noted on CT C-spine  History of L5-S1 right-sided fusion  MRI CT L-spine pending   MRI C-spine pending per Neurosurgery

## 2024-07-24 NOTE — PROGRESS NOTES
[facility] - Neurosurgery ([facility])  [facility] Medicine  Progress Note    Patient Name: Dinora Anderson  MRN: [id number]  Patient Class: IP- Inpatient   Admission Date: 7/22/2024  Length of Stay: 2 days  Attending Physician: Cristo Beaulieu MD  Primary Care Provider: Monroe Ly MD        Subjective:     Principal Problem:Central stenosis of spinal canal        HPI:  58-year-old white female admitted as a transfer from [facility] for possible cauda equina syndrome.  Patient reports being in her usual state of health sometime up until 2 days ago when she had a fall at home landing on her right side.  She thinks she fell by simply tripping on her right foot which has been dragging for some time but she is not completely sure.  She does not recall having any dizziness prior to the fall but may have been dizzy either during and/or after the fall.  Prior to the fall there was no nausea headaches or vision changes.  No chest pains or shortness a breath.  She reports being unable to have the strength to get up and she just laid on the floor until Sunday morning when her neighbor came in and called EMS.  She reports having an impaired gait since her surgery in November of last year.  She also reports having worsening weakness and upper and lower extremities in the last few weeks.  She does report having chronic tingling sensation in the right lower extremity.  Patient reports having ongoing urge incontinence for both urine and stool that has been present since the last surgery.  She affirms having a sensation of urge but is unable to hold relieving herself before getting to the restroom.  Reports having frequent accidents.    Denies any history of cancer but does affirm currently smoking.  Denies any history of stroke or CAD.    OSH CT of the lumbar spine demonstrates aforementioned hardware in satisfactory position. A CT of her head does demonstrate an area of hypoattenuation in the left frontal lobe,  likely representing vasogenic edema. CT c spine read as  C5-C6 severe spinal canal stenosis. Tried to undergo MRI  but was unable to do so due to claustrophobia.  Transferred here to due concerns for cauda equina and NSGY eval.     Overview/Hospital Course:  MRI head unremarkable for any actual brain lesion, felt that CT head was more of a artifactual lesion.  MRI L-spine showed Multilevel spondylosis, most advanced at L3-L4 with moderate spinal canal stenosis and moderate left neural foraminal narrowing for which Neurosurgery affirmed no acute intervention at this time.    Interval History:  Patient reports doing okay today.  No reports of any chest pain or headache.  Afebrile.    Review of Systems  Objective:     Vital Signs (Most Recent):  Temp: 98.9 °F (37.2 °C) (07/24/24 1602)  Pulse: 67 (07/24/24 1602)  Resp: 20 (07/24/24 1705)  BP: 131/64 (07/24/24 1602)  SpO2: 96 % (07/24/24 1602) Vital Signs (24h Range):  Temp:  [97.3 °F (36.3 °C)-98.9 °F (37.2 °C)] 98.9 °F (37.2 °C)  Pulse:  [55-78] 67  Resp:  [16-20] 20  SpO2:  [92 %-98 %] 96 %  BP: (120-163)/(58-91) 131/64     Weight: 112.3 kg (247 lb 9.2 oz)  Body mass index is 39.96 kg/m².    Intake/Output Summary (Last 24 hours) at 7/24/2024 1710  Last data filed at 7/24/2024 0831  Gross per 24 hour   Intake --   Output 1350 ml   Net -1350 ml         Physical Exam      Clear lungs bilaterally, unlabored breathing, no cyanosis   Heart sounds indicate a regular rate and rhythm   No facial droop, no slurred speech   5/5 proximal upper extremity strength bilaterally including fist    4/5 hip flexor strength on the right, 5/5 hip flexor strength on the left   No obvious upper nor lower extremity edema         Assessment/Plan:      * Central stenosis of spinal canal  Transferred for evaluation of cauda equina syndrome   Noted on CT C-spine  History of L5-S1 right-sided fusion  MRI L-spine showed Multilevel spondylosis, most advanced at L3-L4 with moderate spinal canal  stenosis and moderate left neural foraminal narrowing which Neurosurgery affirmed no acute intervention at this time  MRI C and T-spine pending         Brain lesion  Noted on CT head showing Possible hypodensity of the left frontal lobe may represent cerebral edema or infarct   Neuro checks   MRI brain unremarkable, suspect CT finding is artifact      Fall  Ground level fall  Unclear of mechanical vs neurogenic, treat underlying issues, see above  Pt/ot  Neurochecks        Lumbar radiculopathy, chronic  Lyrica      Essential hypertension  Losartan    Bipolar 1 disorder, depressed, moderate  Seroquel  lexapro        VTE Risk Mitigation (From admission, onward)           Ordered     IP VTE HIGH RISK PATIENT  Once         07/22/24 0651     Place sequential compression device  Until discontinued         07/22/24 0651                    Discharge Planning   GLENNA: 7/26/2024     Code Status: Full Code   Is the patient medically ready for discharge?:     Reason for patient still in hospital (select all that apply): Patient trending condition, Treatment, Imaging, and Consult recommendations  Discharge Plan A: Rehab                  Cristo Beaulieu MD  Department of Hospital Medicine   [facility] - Neurosurgery ([facility])

## 2024-07-24 NOTE — ASSESSMENT & PLAN NOTE
Transferred for evaluation of cauda equina syndrome   Noted on CT C-spine  History of L5-S1 right-sided fusion  MRI L-spine showed Multilevel spondylosis, most advanced at L3-L4 with moderate spinal canal stenosis and moderate left neural foraminal narrowing which Neurosurgery affirmed no acute intervention at this time  MRI C and T-spine pending

## 2024-07-24 NOTE — ASSESSMENT & PLAN NOTE
The patient is a very pleasant 58-year-old female with a past medical history of L5-S1 radiculopathy on the right side status post L5-S1 fusion on November 20, 2023 with Dr. Davian Orourke, who presents after a mechanical ground level fall on Saturday with worsening BLE radiculopathy, gait imbalance. NSGY consulted for evaluation.    --Admitted  with q4h neurochecks.  --All labs and diagnostics reviewed.  --MRI Brain, Lsp without acute abnormality.  --XR Lsp flex/ex without instability.  --CT head, entire spine reviewed. DDD. Hardware L5-S1 intact. C6-7 stenosis. No acute fracture.  --Monitor for urinary retention. Bladder scan prn and if patient has not voided in 6 hours.   --Obtain MRI C/T spine for evaluation of chronic myelopathy (ordered 7/23).  --Further recs pending completed imaging.   --Please page with exam change or questions.    Dispo: ongoing.    Plan d/w Dr. Cook.

## 2024-07-24 NOTE — SUBJECTIVE & OBJECTIVE
Interval History:  Patient reports doing okay today.  No reports of any chest pain or headache.  Afebrile.    Review of Systems  Objective:     Vital Signs (Most Recent):  Temp: 98.9 °F (37.2 °C) (07/24/24 1602)  Pulse: 67 (07/24/24 1602)  Resp: 20 (07/24/24 1705)  BP: 131/64 (07/24/24 1602)  SpO2: 96 % (07/24/24 1602) Vital Signs (24h Range):  Temp:  [97.3 °F (36.3 °C)-98.9 °F (37.2 °C)] 98.9 °F (37.2 °C)  Pulse:  [55-78] 67  Resp:  [16-20] 20  SpO2:  [92 %-98 %] 96 %  BP: (120-163)/(58-91) 131/64     Weight: 112.3 kg (247 lb 9.2 oz)  Body mass index is 39.96 kg/m².    Intake/Output Summary (Last 24 hours) at 7/24/2024 1710  Last data filed at 7/24/2024 0831  Gross per 24 hour   Intake --   Output 1350 ml   Net -1350 ml         Physical Exam      Clear lungs bilaterally, unlabored breathing, no cyanosis   Heart sounds indicate a regular rate and rhythm   No facial droop, no slurred speech   5/5 proximal upper extremity strength bilaterally including fist    4/5 hip flexor strength on the right, 5/5 hip flexor strength on the left   No obvious upper nor lower extremity edema

## 2024-07-24 NOTE — PLAN OF CARE
Problem: Occupational Therapy  Goal: Occupational Therapy Goal  Description: Goals to be met by: 8/24/24     Patient will increase functional independence with ADLs by performing:    LE Dressing with Supervision with AD if needed.  Grooming while standing at sink with Stand-by Assistance.  Toileting from toilet with Contact Guard Assistance for hygiene and clothing management.   Supine to sit with Stand-by Assistance.  Toilet transfer to toilet with Contact Guard Assistance.    DME Justifications (see above for complete DME recommendations)    Bedside Commode- Patient has a mobility limitation that significantly impairs their ability to participate in one or more mobility related activities of daily living, including toileting. This deficit can be resolved by using a bedside commode. Patient demonstrates mobility limitations that will cause them to be confined to one room at home without bathroom access for up to 30 days. Using a bedside commode will greatly improve the patient's ability to participate in MRADLs.     Rolling Walker- Patient demonstrates a mobility limitation that significantly impairs their ability to participate in one or more mobility related activities of daily living. Patient's mobility limitation cannot be sufficiently resolved with the use of a cane, but can be sufficiently resolved with the use of a rolling walker.The use of a rolling walker will considerably improve their ability to participate in MRADLs. Patient will use the walker on a regular basis at home.        Outcome: Progressing     Patient tolerated OT eval. Goals and POC established. See note for further details.

## 2024-07-24 NOTE — PT/OT/SLP EVAL
Physical Therapy Co-Evaluation    Patient Name:  Dinora Anderson   MRN:  [id number]    Recommendations:     Discharge Recommendations: High Intensity Therapy   Discharge Equipment Recommendations: bedside commode, walker, rolling, hip kit, bath bench, grab bar   Barriers to discharge: Inaccessible home, Decreased caregiver support, and Increased skilled assistance required    Assessment:   Co-evaluation performed due to multiple deficits anticipated requiring two skilled therapists to appropriately and safely assess patient's strength, endurance, functional mobility, and ADL performance while facilitating functional tasks in addition to accommodating for patient's activity tolerance and medical acuity.     Dinora Anderson is a 58 y.o. female admitted with a medical diagnosis of Central stenosis of spinal canal. Despite increased anxious tendencies & heightened fear of falling, patient presented with increased motivation to participate in evaluation, with good response to completed activities and provided education. She required Gladys-SBA for all functional mobility completed this date. Spinal precautions introduced to patient and practiced throughout session, with emphasis placed during bed mobility. She demonstrated tendency to list to L-side for all WB activities as compensatory strategy for deficits noted in RLE strength & sensation. Patient gait trained 5ft cumulatively, across 2 trials, in which observed deviations encompassed step characteristics, step coordination, and gait stability. Based upon information gained, PT recommends high intensity skilled physical therapy services post-acutely. Provided recommendation based upon needed intensity to not only directly address patient's previously listed functional impairments, discrepancy between functional baseline & current mobility status, and high falls risk + positive recent history of falls, but to also positively impact patient's quality of life. Performance  deficits impacting function include weakness, impaired endurance, impaired sensation, impaired functional mobility, impaired balance, gait instability, decreased lower extremity function, decreased upper extremity function, decreased safety awareness, impaired coordination.    Rehab Prognosis: Good; patient would benefit from acute skilled PT services to address these deficits and reach maximum level of function.    Recent Surgery: * No surgery found *      Plan:     During this hospitalization, patient to be seen 4 x/week to address the identified rehab impairments via gait training, therapeutic activities, therapeutic exercises, neuromuscular re-education and progress toward the following goals:    Plan of Care Expires:  08/24/24    Subjective     Chief Complaint: Fear of falling  Patient/Family Comments/goals: To get better  Pain/Comfort:  Pain Rating 1: 8/10  Location 1: back  Pain Addressed 1: Pre-medicate for activity, Reposition, Distraction  Pain Rating Post-Intervention 1: 2/10    Patients cultural, spiritual, Jew conflicts given the current situation: no    Social History:  Residence: Patient lives with their brother in a mobile home with  5STE + LHR . Patient's bathroom has a WIS & T/S.  Equipment Owned: rollator, shower chair  Prior level of function:   At baseline, patient was modified independent for ambulation, utilizing her rollator. She was independent with all other transitions/transfers.  Prior to admission, patient limited ambulation distance secondary to gait instability. Rollator utilized. Patient endorsing 4 falls since back surgery in November 2023.  Assistance Upon Discharge:  Brother - limited secondary to working offshore; Not home for weeks at a time    Objective:     Communicated with Nsg prior to session.  Patient found left sidelying with PureWick, oxygen, bed alarm  upon PT entry to room.    General Precautions: Standard, fall   Orthopedic Precautions:N/A   Braces: N/A   Body  "mass index is 39.96 kg/m².  Oxygen Device: Nasal Cannula 2L  Vitals: /64 (Patient Position: Lying)   Pulse 67   Temp 98.9 °F (37.2 °C) (Oral)   Resp 18   Ht 5' 6" (1.676 m)   Wt 112.3 kg (247 lb 9.2 oz)   SpO2 96%   BMI 39.96 kg/m²     Exams:  Cognition:   Alert, Cooperative, and Anxious   Patient is oriented to Person, Place, Time, Situation  Command following: Follows multistep verbal commands  Fluency: clear/fluent  Skin Integrity: Visible skin intact  Postural Assessment: slouched posture, rounded shoulders, listing to L-side  Physical Exam:    Left LE Right LE   Sensation impaired to light touch at L4-L5 dermatome Intact to light touch   Coordination impaired impaired     LLE ROM: WFL  RLE ROM: WFL except ankle DF    LLE Strength (out of 5):   Hip Flexion:4+: Holds test position against MODERATE to STRONG pressure  Hip Abduction:4+: Holds test position against MODERATE to STRONG pressure  Hip Adduction:4+: Holds test position against MODERATE to STRONG pressure  Knee Extension:4+: Holds test position against MODERATE to STRONG pressure  Knee Flexion:4: Holds test positioning against MODERATE pressure  Ankle Dorsiflexion:4+: Holds test position against MODERATE to STRONG pressure    RLE Strength (out of 5):   Hip Flexion:3: Holds test position  Hip Abduction:4: Holds test positioning against MODERATE pressure  Hip Adduction:4: Holds test positioning against MODERATE pressure  Knee Extension:2+: Moves through Partial AROM  Knee Flexion:3: Holds test position  Ankle Dorsiflexion:2+: Moves through Partial AROM    Functional Mobility:  Bed Mobility:     Rolling Left: Minimal Assistance with HOB Elevated  EOB Scooting:   Anterior: Stand-By Assistance  Supine>Sit: x1, Minimal Assistance with HOB Elevated  *VC/TC for task sequencing, spinal precautions  *Facilitation of trunk management, LE management    Transfers:     Sit<>Stand:   x1, Contact Guard Assistance from Edge of Bed with Rolling Walker  x1, " Contact Guard Assistance from Bedside Chair with Rolling Walker  x1, Minimal Assistance from Bedside Chair with Rolling Walker  *VC/TC for UE placement, anterior WS, upright posturing    Gait:  x2ft + 3ft, Minimal Assistance with Rolling Walker + chair follow  Gait Assessment: decreased step length, decreased step height, inconsistent foot placement (R>L), decreased phi, decreased gait speed, unsteady gait  Total Distance: 5ft  *VC/TC for upright posturing, glute engagement, lateral WS, step sequencing, AD management, relaxation  *Facilitation of hip stabilization, lateral WS    Balance:   Static Sitting: Stand-By Assistance  Dynamic Sitting: Contact Guard Assistance    Static Standing: Minimal Assistance  Dynamic Standing: Minimal Assistance  *VC/TC for upright posturing, glute engagement, equal LE WB, relaxation  *Facilitation of trunk extension, hip extension, corrective WS    AM-PAC 6 CLICK MOBILITY  Total Score:17     Treatment & Education:  Patient Education Provided on:  The role of physical therapy and how the patient can benefit from skilled services  The negative effects of prolonged bed rest/sedentary behavior, along with the importance of OOB activity & patient participation with PT  The importance of contacting RN, via call light, for mobility throughout the day  Pt white board updated with current therapists name and level of mobility assistance needed.     Patient Verbalized understanding of all topics touched on this date. All patient questions answered within the PT scope of practice    Patient left up in chair with all lines intact, call button in reach, and chair alarm on.    GOALS:   Multidisciplinary Problems       Physical Therapy Goals          Problem: Physical Therapy    Goal Priority Disciplines Outcome Goal Variances Interventions   Physical Therapy Goal     PT, PT/OT Progressing     Description: Goals to be met by: 08/08/24     Patient will increase functional independence with  mobility by performin. Supine <> sit with [place]  2. Rolling to Left and Right with [place].  3. Sit <> stand transfer with Modified [place] with LRAD  4. Bed <> chair transfer with Modified [place] using LRAD  5. Gait  x 100 feet with Modified [place] using LRAD   6. Ascend/descend 5 stair with left Handrails Stand-by Assistance with or without LRAD   7. Sitting at edge of bed 10 minutes with [place]  8. Stand for 8 minutes with Modified [place] with LRAD  9. Lower extremity exercise program x30 reps per handout, with assistance as needed                         History:     Past Medical History:   Diagnosis Date    Bipolar 1 disorder     Bipolar 1 disorder     bipolar c schizophrenia and ptsd    Depression     Polysubstance abuse     PTSD (post-traumatic stress disorder)     Schizoaffective disorder     Schizophrenia        Past Surgical History:   Procedure Laterality Date    TUBAL LIGATION         Time Tracking:     PT Received On: 24  PT Start Time: 1057     PT Stop Time: 1140  PT Total Time (min): 43 min     Billable Minutes: Evaluation 10, Gait Training 11, and Therapeutic Activity 2024

## 2024-07-24 NOTE — ASSESSMENT & PLAN NOTE
Noted on CT head showing Possible hypodensity of the left frontal lobe may represent cerebral edema or infarct   Neuro checks   MRI brain unremarkable, suspect CT finding is artifact

## 2024-07-24 NOTE — PLAN OF CARE
Problem: Physical Therapy  Goal: Physical Therapy Goal  Description: Goals to be met by: 24     Patient will increase functional independence with mobility by performin. Supine <> sit with [place]  2. Rolling to Left and Right with [place].  3. Sit <> stand transfer with Modified [place] with LRAD  4. Bed <> chair transfer with Modified [place] using LRAD  5. Gait  x 100 feet with Modified [place] using LRAD   6. Ascend/descend 5 stair with left Handrails Stand-by Assistance with or without LRAD   7. Sitting at edge of bed 10 minutes with [place]  8. Stand for 8 minutes with Modified [place] with LRAD  9. Lower extremity exercise program x30 reps per handout, with assistance as needed    Outcome: Progressing    Evaluation Complete. Goals Appropriate.

## 2024-07-24 NOTE — CARE UPDATE
I have reviewed the chart of Dinroa Anderson who is hospitalized for the following:    Active Hospital Problems    Diagnosis    *Central stenosis of spinal canal    Thrombocytopenia     Monitor with daily cbc        Lumbar myelopathy    Nicotine dependence    Severe obesity (BMI >= 40)    Drug dependence     THC + on drug screen  [place] on cessation       Pain    Sensory loss    Fall    Brain lesion    Lumbar radiculopathy, chronic    Essential hypertension    Schizophrenia    Anxiety disorder    Bipolar 1 disorder, depressed, moderate        Lupis Multani NP  Unit Based [facility]

## 2024-07-25 LAB
ALBUMIN SERPL BCP-MCNC: 2.9 G/DL (ref 3.5–5.2)
ALP SERPL-CCNC: 131 U/L (ref 55–135)
ALT SERPL W/O P-5'-P-CCNC: 48 U/L (ref 10–44)
ANION GAP SERPL CALC-SCNC: 7 MMOL/L (ref 8–16)
AST SERPL-CCNC: 33 U/L (ref 10–40)
BASOPHILS # BLD AUTO: 0.02 K/UL (ref 0–0.2)
BASOPHILS NFR BLD: 0.5 % (ref 0–1.9)
BILIRUB SERPL-MCNC: 0.4 MG/DL (ref 0.1–1)
BUN SERPL-MCNC: 15 MG/DL (ref 6–20)
CALCIUM SERPL-MCNC: 8.3 MG/DL (ref 8.7–10.5)
CHLORIDE SERPL-SCNC: 104 MMOL/L (ref 95–110)
CO2 SERPL-SCNC: 27 MMOL/L (ref 23–29)
CREAT SERPL-MCNC: 0.7 MG/DL (ref 0.5–1.4)
DIFFERENTIAL METHOD BLD: ABNORMAL
EOSINOPHIL # BLD AUTO: 0.1 K/UL (ref 0–0.5)
EOSINOPHIL NFR BLD: 1.4 % (ref 0–8)
ERYTHROCYTE [DISTWIDTH] IN BLOOD BY AUTOMATED COUNT: 14.3 % (ref 11.5–14.5)
EST. GFR  (NO RACE VARIABLE): >60 ML/MIN/1.73 M^2
GLUCOSE SERPL-MCNC: 108 MG/DL (ref 70–110)
HCT VFR BLD AUTO: 41.4 % (ref 37–48.5)
HGB BLD-MCNC: 13.7 G/DL (ref 12–16)
IMM GRANULOCYTES # BLD AUTO: 0.01 K/UL (ref 0–0.04)
IMM GRANULOCYTES NFR BLD AUTO: 0.2 % (ref 0–0.5)
LYMPHOCYTES # BLD AUTO: 1.4 K/UL (ref 1–4.8)
LYMPHOCYTES NFR BLD: 31.4 % (ref 18–48)
MAGNESIUM SERPL-MCNC: 2.3 MG/DL (ref 1.6–2.6)
MCH RBC QN AUTO: 30.8 PG (ref 27–31)
MCHC RBC AUTO-ENTMCNC: 33.1 G/DL (ref 32–36)
MCV RBC AUTO: 93 FL (ref 82–98)
MONOCYTES # BLD AUTO: 0.3 K/UL (ref 0.3–1)
MONOCYTES NFR BLD: 7.6 % (ref 4–15)
NEUTROPHILS # BLD AUTO: 2.6 K/UL (ref 1.8–7.7)
NEUTROPHILS NFR BLD: 58.9 % (ref 38–73)
NRBC BLD-RTO: 0 /100 WBC
PHOSPHATE SERPL-MCNC: 3.3 MG/DL (ref 2.7–4.5)
PLATELET # BLD AUTO: 136 K/UL (ref 150–450)
PMV BLD AUTO: 10.1 FL (ref 9.2–12.9)
POCT GLUCOSE: 144 MG/DL (ref 70–110)
POTASSIUM SERPL-SCNC: 4 MMOL/L (ref 3.5–5.1)
PROT SERPL-MCNC: 6.2 G/DL (ref 6–8.4)
RBC # BLD AUTO: 4.45 M/UL (ref 4–5.4)
SODIUM SERPL-SCNC: 138 MMOL/L (ref 136–145)
WBC # BLD AUTO: 4.37 K/UL (ref 3.9–12.7)

## 2024-07-25 PROCEDURE — 84100 ASSAY OF PHOSPHORUS: CPT | Performed by: HOSPITALIST

## 2024-07-25 PROCEDURE — 36415 COLL VENOUS BLD VENIPUNCTURE: CPT | Performed by: HOSPITALIST

## 2024-07-25 PROCEDURE — 83735 ASSAY OF MAGNESIUM: CPT | Performed by: HOSPITALIST

## 2024-07-25 PROCEDURE — 11000001 HC ACUTE MED/SURG PRIVATE ROOM

## 2024-07-25 PROCEDURE — 80053 COMPREHEN METABOLIC PANEL: CPT | Performed by: HOSPITALIST

## 2024-07-25 PROCEDURE — 63600175 PHARM REV CODE 636 W HCPCS: Mod: UD | Performed by: HOSPITALIST

## 2024-07-25 PROCEDURE — 85025 COMPLETE CBC W/AUTO DIFF WBC: CPT | Performed by: HOSPITALIST

## 2024-07-25 PROCEDURE — 25000003 PHARM REV CODE 250: Performed by: HOSPITALIST

## 2024-07-25 PROCEDURE — 99232 SBSQ HOSP IP/OBS MODERATE 35: CPT | Mod: ,,, | Performed by: PHYSICIAN ASSISTANT

## 2024-07-25 RX ADMIN — MICONAZOLE NITRATE: 20 OINTMENT TOPICAL at 09:07

## 2024-07-25 RX ADMIN — MORPHINE SULFATE 2 MG: 2 INJECTION, SOLUTION INTRAMUSCULAR; INTRAVENOUS at 12:07

## 2024-07-25 RX ADMIN — ALUMINUM HYDROXIDE, MAGNESIUM HYDROXIDE, AND SIMETHICONE 30 ML: 1200; 120; 1200 SUSPENSION ORAL at 11:07

## 2024-07-25 RX ADMIN — SUCRALFATE 1 G: 1 SUSPENSION ORAL at 11:07

## 2024-07-25 RX ADMIN — PANTOPRAZOLE SODIUM 40 MG: 40 TABLET, DELAYED RELEASE ORAL at 08:07

## 2024-07-25 RX ADMIN — MORPHINE SULFATE 2 MG: 2 INJECTION, SOLUTION INTRAMUSCULAR; INTRAVENOUS at 09:07

## 2024-07-25 RX ADMIN — MORPHINE SULFATE 2 MG: 2 INJECTION, SOLUTION INTRAMUSCULAR; INTRAVENOUS at 05:07

## 2024-07-25 RX ADMIN — LOSARTAN POTASSIUM 25 MG: 25 TABLET, FILM COATED ORAL at 08:07

## 2024-07-25 RX ADMIN — SUCRALFATE 1 G: 1 SUSPENSION ORAL at 05:07

## 2024-07-25 RX ADMIN — POLYETHYLENE GLYCOL 3350 17 G: 17 POWDER, FOR SOLUTION ORAL at 08:07

## 2024-07-25 RX ADMIN — ALUMINUM HYDROXIDE, MAGNESIUM HYDROXIDE, AND SIMETHICONE 30 ML: 1200; 120; 1200 SUSPENSION ORAL at 05:07

## 2024-07-25 RX ADMIN — ALUMINUM HYDROXIDE, MAGNESIUM HYDROXIDE, AND SIMETHICONE 30 ML: 1200; 120; 1200 SUSPENSION ORAL at 09:07

## 2024-07-25 RX ADMIN — ALUMINUM HYDROXIDE, MAGNESIUM HYDROXIDE, AND SIMETHICONE 30 ML: 1200; 120; 1200 SUSPENSION ORAL at 03:07

## 2024-07-25 RX ADMIN — PREGABALIN 100 MG: 50 CAPSULE ORAL at 09:07

## 2024-07-25 RX ADMIN — MORPHINE SULFATE 2 MG: 2 INJECTION, SOLUTION INTRAMUSCULAR; INTRAVENOUS at 11:07

## 2024-07-25 RX ADMIN — CITALOPRAM HYDROBROMIDE 20 MG: 20 TABLET ORAL at 08:07

## 2024-07-25 RX ADMIN — SUCRALFATE 1 G: 1 SUSPENSION ORAL at 06:07

## 2024-07-25 RX ADMIN — QUETIAPINE FUMARATE 100 MG: 25 TABLET ORAL at 09:07

## 2024-07-25 RX ADMIN — Medication 6 MG: at 09:07

## 2024-07-25 RX ADMIN — PREGABALIN 100 MG: 50 CAPSULE ORAL at 08:07

## 2024-07-25 RX ADMIN — TRAMADOL HYDROCHLORIDE 50 MG: 50 TABLET, COATED ORAL at 08:07

## 2024-07-25 RX ADMIN — SUCRALFATE 1 G: 1 SUSPENSION ORAL at 12:07

## 2024-07-25 RX ADMIN — PREGABALIN 100 MG: 50 CAPSULE ORAL at 03:07

## 2024-07-25 NOTE — ASSESSMENT & PLAN NOTE
The patient is a very pleasant 58-year-old female with a past medical history of L5-S1 radiculopathy on the right side status post L5-S1 fusion on November 20, 2023 with Dr. Davian Orourke, who presents after a mechanical ground level fall on Saturday with worsening BLE radiculopathy, gait imbalance. NSGY consulted for evaluation.    --Admitted  with q4h neurochecks.  --All labs and diagnostics reviewed.  --MRI Brain, Lsp without acute abnormality.  --XR Lsp flex/ex without instability.  --CT head, entire spine reviewed. DDD. Hardware L5-S1 intact. C6-7 stenosis. No acute fracture.  --Monitor for urinary retention. Bladder scan prn and if patient has not voided in 6 hours.   --MRI C/T reviewed with staff. C4-6 moderate canal stenosis noted.   --Can address cervical stenosis and need for intervention on an outpatient basis. Referral to pain clinic for chronic radiculopathy. F/u with Dr. Davian Orourke for lumbar spine. NSGY Will sign off.  --Please page with exam change or questions.    Dispo: IPR, stable from NSGY standpoint for discharge    Plan d/w Dr. Cook.

## 2024-07-25 NOTE — PROGRESS NOTES
[facility] - Neurosurgery ([facility])  Neurosurgery  Progress Note    Subjective:     History of Present Illness: The patient is a very pleasant 58-year-old female with a past medical history of L5-S1 radiculopathy on the right side status post L5-S1 fusion on November 20, 2023 with Dr. Davian Orourke, who presents after a mechanical ground level fall on Saturday.  Patient reports that she fell on Saturday when her legs gave out, and fell on her right side.  She denies back or neck or head trauma.  She denies loss of consciousness.  She states that she was down for almost 24 hours before EMS arrived to take her to the hospital.  Currently, she states that she has shooting pain that travels down the right lower extremity along the right lateral thigh and leg and the plantar aspect of the foot which occurs when she gets muscle spasms.  She also has numbness and tingling in that same distribution.  Reports LLE paresthesias in the calf and plantar aspect of the foot. She reports functional decline for the last 4 months, gait imbalance, and hand clumsiness. She also reports bowel and bladder urgency since her surgery.  She denies any episodes of urinating or stooling on herself without realizing it.  She denies headache, vision/hearing changes, dysphagia/dysphonia, chest pain, abdominal pain, nausea and vomiting, new onset weakness or altered sensorium.     Post-Op Info:  * No surgery found *       Interval History: NAEON. Exam stable. Able to participate with therapy yesterday. Did not notice issues with fine motor skills. Required assistance x 2 people along with walker. Patient endorses RLE pain with movement. Referral placed to pain clinic and recommended follow-up with Dr. Orourke at discharge. Offered to follow-up in NSGY clinic to discuss cervical pathology once discharged from [facility].    Medications:  Continuous Infusions:   0.9% NaCl   Intravenous Continuous 75 mL/hr at 07/22/24 1454 Rate Verify at 07/22/24 145      Scheduled Meds:   aluminum-magnesium hydroxide-simethicone  30 mL Oral QID (AC & HS)    citalopram  20 mg Oral Daily    losartan  25 mg Oral Daily    miconazole nitrate 2%   Topical (Top) BID    pantoprazole  40 mg Oral Daily    polyethylene glycol  17 g Oral Daily    pregabalin  100 mg Oral TID    QUEtiapine  100 mg Oral QHS    sucralfate  1 g Oral Q6H     PRN Meds:  Current Facility-Administered Medications:     acetaminophen, 650 mg, Oral, Q6H PRN    albuterol-ipratropium, 3 mL, Nebulization, Q4H PRN    dextrose 10%, 12.5 g, Intravenous, PRN    dextrose 10%, 25 g, Intravenous, PRN    diazePAM, 2.5 mg, Intravenous, On Call Procedure    glucagon (human recombinant), 1 mg, Intramuscular, PRN    glucose, 16 g, Oral, PRN    glucose, 24 g, Oral, PRN    melatonin, 6 mg, Oral, Nightly PRN    morphine, 2 mg, Intravenous, Q4H PRN    naloxone, 0.02 mg, Intravenous, PRN    ondansetron, 4 mg, Intravenous, Q6H PRN    senna-docusate 8.6-50 mg, 2 tablet, Oral, BID PRN    sodium chloride 0.9%, 10 mL, Intravenous, Q12H PRN    traMADoL, 50 mg, Oral, Q12H PRN     Review of Systems  Objective:     Weight: 112.3 kg (247 lb 9.2 oz)  Body mass index is 39.96 kg/m².  Vital Signs (Most Recent):  Temp: 98.2 °F (36.8 °C) (07/25/24 0739)  Pulse: 100 (07/25/24 0739)  Resp: 20 (07/25/24 1105)  BP: (!) 160/77 (07/25/24 0739)  SpO2: 95 % (07/25/24 0739) Vital Signs (24h Range):  Temp:  [97.9 °F (36.6 °C)-98.9 °F (37.2 °C)] 98.2 °F (36.8 °C)  Pulse:  [] 100  Resp:  [18-20] 20  SpO2:  [95 %-98 %] 95 %  BP: (109-160)/(59-77) 160/77                                   Physical Exam     Neurosurgery Physical Exam    General: well developed, well nourished, no distress.   Head: normocephalic, atraumatic  Neurologic: Alert and oriented. Thought content appropriate.  GCS: Motor: 6/Verbal: 5/Eyes: 4 GCS Total: 15  Mental Status: Awake, Alert, Oriented x 4  Language: No aphasia  Speech: No dysarthria  Cranial nerves: face symmetric, tongue  "midline, CN II-XII grossly intact.   Eyes: pupils equal, round, reactive to light with accomodation, EOMI.   Pulmonary: normal respirations, no signs of respiratory distress  Sensory: intact to light touch throughout  Motor Strength: Moves all extremities spontaneously with good tone. No abnormal movements seen. Pain limited weakness right proximal LE.    Strength  Deltoids Triceps Biceps Wrist Extension Wrist Flexion Hand    Upper: R 5/5 5/5 5/5 5/5 5/5 5/5    L 5/5 5/5 5/5 5/5 5/5 5/5     Iliopsoas Quadriceps Knee  Flexion Tibialis  anterior Gastro- cnemius EHL   Lower: R 4+/5 4+/5 4+/5 5/5 5/5 5/5    L 5/5 5/5 5/5 5/5 5/5 5/5     DTR's - 2 + and symmetric in UE and LE  Byrnes: present bilaterally  Clonus: absent  Skin: Skin is warm, dry and intact.  Prior lumbar incision well healed. No surrounding edema.      Significant Labs:  Recent Labs   Lab 07/24/24  0403 07/25/24  0255   * 108    138   K 3.7 4.0    104   CO2 27 27   BUN 12 15   CREATININE 0.8 0.7   CALCIUM 8.7 8.3*   MG 2.4 2.3     Recent Labs   Lab 07/24/24  0403 07/25/24  0255   WBC 5.22 4.37   HGB 13.9 13.7   HCT 43.0 41.4   * 136*     No results for input(s): "LABPT", "INR", "APTT" in the last 48 hours.    Microbiology Results (last 7 days)       ** No results found for the last 168 hours. **          Recent Lab Results         07/25/24  0255   07/24/24  1604        Albumin 2.9                  ALT 48         Anion Gap 7         AST 33         Baso # 0.02         Basophil % 0.5         BILIRUBIN TOTAL 0.4  Comment: For infants and newborns, interpretation of results should be based  on gestational age, weight and in agreement with clinical  observations.    Premature Infant recommended reference ranges:  Up to 24 hours.............<8.0 mg/dL  Up to 48 hours............<12.0 mg/dL  3-5 days..................<15.0 mg/dL  6-29 days.................<15.0 mg/dL           BUN 15         Calcium 8.3         Chloride 104   "       CO2 27         Creatinine 0.7         Differential Method Automated         eGFR >60.0         Eos # 0.1         Eos % 1.4         Glucose 108         Gran # (ANC) 2.6         Gran % 58.9         Hematocrit 41.4         Hemoglobin 13.7         Immature Grans (Abs) 0.01  Comment: Mild elevation in immature granulocytes is non specific and   can be seen in a variety of conditions including stress response,   acute inflammation, trauma and pregnancy. Correlation with other   laboratory and clinical findings is essential.           Immature Granulocytes 0.2         Lymph # 1.4         Lymph % 31.4         Magnesium  2.3         MCH 30.8         MCHC 33.1         MCV 93         Mono # 0.3         Mono % 7.6         MPV 10.1         nRBC 0         Phosphorus Level 3.3         Platelet Count 136         POCT Glucose   112       Potassium 4.0         PROTEIN TOTAL 6.2         RBC 4.45         RDW 14.3         Sodium 138         WBC 4.37               All pertinent labs from the last 24 hours have been reviewed.    Significant Diagnostics:  I have reviewed all pertinent imaging results/findings within the past 24 hours.  Assessment/Plan:     Lumbar radiculopathy, chronic  The patient is a very pleasant 58-year-old female with a past medical history of L5-S1 radiculopathy on the right side status post L5-S1 fusion on November 20, 2023 with Dr. Davian Orourke, who presents after a mechanical ground level fall on Saturday with worsening BLE radiculopathy, gait imbalance. NSGY consulted for evaluation.    --Admitted  with q4h neurochecks.  --All labs and diagnostics reviewed.  --MRI Brain, Lsp without acute abnormality.  --XR Lsp flex/ex without instability.  --CT head, entire spine reviewed. DDD. Hardware L5-S1 intact. C6-7 stenosis. No acute fracture.  --Monitor for urinary retention. Bladder scan prn and if patient has not voided in 6 hours.   --MRI C/T reviewed with staff. C4-6 moderate canal stenosis noted.   --Can  address cervical stenosis and need for intervention on an outpatient basis. Referral to pain clinic for chronic radiculopathy. F/u with Dr. Davian Orourke for lumbar spine. NSGY Will sign off.  --Please page with exam change or questions.    Dispo: IPR, stable from NSGY standpoint for discharge    Plan d/w Dr. Cook.        Flora Gómez PA-C  Neurosurgery  [facility] - Neurosurgery ([facility])

## 2024-07-25 NOTE — SUBJECTIVE & OBJECTIVE
Interval History: NAEON. Exam stable. Able to participate with therapy yesterday. Did not notice issues with fine motor skills. Required assistance x 2 people along with walker. Patient endorses RLE pain with movement. Referral placed to pain clinic and recommended follow-up with Dr. Orourke at discharge. Offered to follow-up in NSGY clinic to discuss cervical pathology once discharged from [facility].    Medications:  Continuous Infusions:   0.9% NaCl   Intravenous Continuous 75 mL/hr at 07/22/24 1454 Rate Verify at 07/22/24 1454     Scheduled Meds:   aluminum-magnesium hydroxide-simethicone  30 mL Oral QID (AC & HS)    citalopram  20 mg Oral Daily    losartan  25 mg Oral Daily    miconazole nitrate 2%   Topical (Top) BID    pantoprazole  40 mg Oral Daily    polyethylene glycol  17 g Oral Daily    pregabalin  100 mg Oral TID    QUEtiapine  100 mg Oral QHS    sucralfate  1 g Oral Q6H     PRN Meds:  Current Facility-Administered Medications:     acetaminophen, 650 mg, Oral, Q6H PRN    albuterol-ipratropium, 3 mL, Nebulization, Q4H PRN    dextrose 10%, 12.5 g, Intravenous, PRN    dextrose 10%, 25 g, Intravenous, PRN    diazePAM, 2.5 mg, Intravenous, On Call Procedure    glucagon (human recombinant), 1 mg, Intramuscular, PRN    glucose, 16 g, Oral, PRN    glucose, 24 g, Oral, PRN    melatonin, 6 mg, Oral, Nightly PRN    morphine, 2 mg, Intravenous, Q4H PRN    naloxone, 0.02 mg, Intravenous, PRN    ondansetron, 4 mg, Intravenous, Q6H PRN    senna-docusate 8.6-50 mg, 2 tablet, Oral, BID PRN    sodium chloride 0.9%, 10 mL, Intravenous, Q12H PRN    traMADoL, 50 mg, Oral, Q12H PRN     Review of Systems  Objective:     Weight: 112.3 kg (247 lb 9.2 oz)  Body mass index is 39.96 kg/m².  Vital Signs (Most Recent):  Temp: 98.2 °F (36.8 °C) (07/25/24 0739)  Pulse: 100 (07/25/24 0739)  Resp: 20 (07/25/24 1105)  BP: (!) 160/77 (07/25/24 0739)  SpO2: 95 % (07/25/24 0739) Vital Signs (24h Range):  Temp:  [97.9 °F (36.6 °C)-98.9 °F (37.2 °C)]  "98.2 °F (36.8 °C)  Pulse:  [] 100  Resp:  [18-20] 20  SpO2:  [95 %-98 %] 95 %  BP: (109-160)/(59-77) 160/77                                   Physical Exam     Neurosurgery Physical Exam    General: well developed, well nourished, no distress.   Head: normocephalic, atraumatic  Neurologic: Alert and oriented. Thought content appropriate.  GCS: Motor: 6/Verbal: 5/Eyes: 4 GCS Total: 15  Mental Status: Awake, Alert, Oriented x 4  Language: No aphasia  Speech: No dysarthria  Cranial nerves: face symmetric, tongue midline, CN II-XII grossly intact.   Eyes: pupils equal, round, reactive to light with accomodation, EOMI.   Pulmonary: normal respirations, no signs of respiratory distress  Sensory: intact to light touch throughout  Motor Strength: Moves all extremities spontaneously with good tone. No abnormal movements seen. Pain limited weakness right proximal LE.    Strength  Deltoids Triceps Biceps Wrist Extension Wrist Flexion Hand    Upper: R 5/5 5/5 5/5 5/5 5/5 5/5    L 5/5 5/5 5/5 5/5 5/5 5/5     Iliopsoas Quadriceps Knee  Flexion Tibialis  anterior Gastro- cnemius EHL   Lower: R 4+/5 4+/5 4+/5 5/5 5/5 5/5    L 5/5 5/5 5/5 5/5 5/5 5/5     DTR's - 2 + and symmetric in UE and LE  Byrnes: present bilaterally  Clonus: absent  Skin: Skin is warm, dry and intact.  Prior lumbar incision well healed. No surrounding edema.      Significant Labs:  Recent Labs   Lab 07/24/24  0403 07/25/24  0255   * 108    138   K 3.7 4.0    104   CO2 27 27   BUN 12 15   CREATININE 0.8 0.7   CALCIUM 8.7 8.3*   MG 2.4 2.3     Recent Labs   Lab 07/24/24  0403 07/25/24  0255   WBC 5.22 4.37   HGB 13.9 13.7   HCT 43.0 41.4   * 136*     No results for input(s): "LABPT", "INR", "APTT" in the last 48 hours.    Microbiology Results (last 7 days)       ** No results found for the last 168 hours. **          Recent Lab Results         07/25/24  0255   07/24/24  1604        Albumin 2.9                  ALT 48    "      Anion Gap 7         AST 33         Baso # 0.02         Basophil % 0.5         BILIRUBIN TOTAL 0.4  Comment: For infants and newborns, interpretation of results should be based  on gestational age, weight and in agreement with clinical  observations.    Premature Infant recommended reference ranges:  Up to 24 hours.............<8.0 mg/dL  Up to 48 hours............<12.0 mg/dL  3-5 days..................<15.0 mg/dL  6-29 days.................<15.0 mg/dL           BUN 15         Calcium 8.3         Chloride 104         CO2 27         Creatinine 0.7         Differential Method Automated         eGFR >60.0         Eos # 0.1         Eos % 1.4         Glucose 108         Gran # (ANC) 2.6         Gran % 58.9         Hematocrit 41.4         Hemoglobin 13.7         Immature Grans (Abs) 0.01  Comment: Mild elevation in immature granulocytes is non specific and   can be seen in a variety of conditions including stress response,   acute inflammation, trauma and pregnancy. Correlation with other   laboratory and clinical findings is essential.           Immature Granulocytes 0.2         Lymph # 1.4         Lymph % 31.4         Magnesium  2.3         MCH 30.8         MCHC 33.1         MCV 93         Mono # 0.3         Mono % 7.6         MPV 10.1         nRBC 0         Phosphorus Level 3.3         Platelet Count 136         POCT Glucose   112       Potassium 4.0         PROTEIN TOTAL 6.2         RBC 4.45         RDW 14.3         Sodium 138         WBC 4.37               All pertinent labs from the last 24 hours have been reviewed.    Significant Diagnostics:  I have reviewed all pertinent imaging results/findings within the past 24 hours.

## 2024-07-25 NOTE — PROGRESS NOTES
[facility] - Neurosurgery ([facility])  [facility] Medicine  Progress Note    Patient Name: Dinora Anderson  MRN: [id number]  Patient Class: IP- Inpatient   Admission Date: 7/22/2024  Length of Stay: 3 days  Attending Physician: Angelic Kimball MD  Primary Care Provider: Monroe Ly MD        Subjective:     Principal Problem:Central stenosis of spinal canal        HPI:  58-year-old white female admitted as a transfer from [facility] for possible cauda equina syndrome.  Patient reports being in her usual state of health sometime up until 2 days ago when she had a fall at home landing on her right side.  She thinks she fell by simply tripping on her right foot which has been dragging for some time but she is not completely sure.  She does not recall having any dizziness prior to the fall but may have been dizzy either during and/or after the fall.  Prior to the fall there was no nausea headaches or vision changes.  No chest pains or shortness a breath.  She reports being unable to have the strength to get up and she just laid on the floor until Sunday morning when her neighbor came in and called EMS.  She reports having an impaired gait since her surgery in November of last year.  She also reports having worsening weakness and upper and lower extremities in the last few weeks.  She does report having chronic tingling sensation in the right lower extremity.  Patient reports having ongoing urge incontinence for both urine and stool that has been present since the last surgery.  She affirms having a sensation of urge but is unable to hold relieving herself before getting to the restroom.  Reports having frequent accidents.    Denies any history of cancer but does affirm currently smoking.  Denies any history of stroke or CAD.    OSH CT of the lumbar spine demonstrates aforementioned hardware in satisfactory position. A CT of her head does demonstrate an area of hypoattenuation in the left frontal lobe,  likely representing vasogenic edema. CT c spine read as  C5-C6 severe spinal canal stenosis. Tried to undergo MRI  but was unable to do so due to claustrophobia.  Transferred here to due concerns for cauda equina and NSGY eval.     Overview/Hospital Course:  MRI head unremarkable for any actual brain lesion, felt that CT head was more of a artifactual lesion.  MRI L-spine showed Multilevel spondylosis, most advanced at L3-L4 with moderate spinal canal stenosis and moderate left neural foraminal narrowing for which Neurosurgery affirmed no acute intervention at this time.    Interval History:   7/25: Patient still with some lower back pain. Discussed that MRI findings are non-operative at this time. She lives alone and is having signficant trouble ambulating safely. She is open to SNF placement    Review of Systems  All systems were reviewed and remain negative.   Objective:     Vital Signs (Most Recent):  Temp: 98.9 °F (37.2 °C) (07/25/24 1514)  Pulse: 69 (07/25/24 1514)  Resp: 18 (07/25/24 1514)  BP: 118/64 (07/25/24 1514)  SpO2: (!) 93 % (07/25/24 1514) Vital Signs (24h Range):  Temp:  [97.9 °F (36.6 °C)-98.9 °F (37.2 °C)] 98.9 °F (37.2 °C)  Pulse:  [] 69  Resp:  [18-20] 18  SpO2:  [93 %-98 %] 93 %  BP: (109-160)/(59-77) 118/64     Weight: 112.3 kg (247 lb 9.2 oz)  Body mass index is 39.96 kg/m².  No intake or output data in the 24 hours ending 07/25/24 1613      Physical Exam  Vitals and nursing note reviewed.   Constitutional:       General: She is not in acute distress.  HENT:      Right Ear: External ear normal.      Left Ear: External ear normal.      Nose: Nose normal.      Mouth/Throat:      Pharynx: No oropharyngeal exudate.   Eyes:      Extraocular Movements: Extraocular movements intact.      Pupils: Pupils are equal, round, and reactive to light.   Cardiovascular:      Rate and Rhythm: Normal rate and regular rhythm.      Pulses: Normal pulses.      Heart sounds: No murmur heard.  Pulmonary:       Effort: No respiratory distress.   Chest:      Chest wall: No tenderness.   Abdominal:      General: Abdomen is flat. There is no distension.   Musculoskeletal:      Cervical back: No rigidity.   Neurological:      Mental Status: She is alert.             Significant Labs: All pertinent labs within the past 24 hours have been reviewed.    Significant Imaging: I have reviewed all pertinent imaging results/findings within the past 24 hours.    Assessment/Plan:      * Central stenosis of spinal canal  Transferred for evaluation of cauda equina syndrome   Noted on CT C-spine  History of L5-S1 right-sided fusion  MRI L-spine showed Multilevel spondylosis, most advanced at L3-L4 with moderate spinal canal stenosis and moderate left neural foraminal narrowing which Neurosurgery affirmed no acute intervention at this time  MRI C and T-spine pending         Brain lesion  Noted on CT head showing Possible hypodensity of the left frontal lobe may represent cerebral edema or infarct   Neuro checks   MRI brain unremarkable, suspect CT finding is artifact      Fall  Ground level fall  Unclear of mechanical vs neurogenic, treat underlying issues, see above  Pt/ot  Neurochecks        Lumbar radiculopathy, chronic  Lyrica      Essential hypertension  Losartan    Bipolar 1 disorder, depressed, moderate  Seroquel  lexapro        VTE Risk Mitigation (From admission, onward)           Ordered     IP VTE HIGH RISK PATIENT  Once         07/22/24 0651     Place sequential compression device  Until discontinued         07/22/24 0651                    Discharge Planning   GLENNA: 7/26/2024     Code Status: Full Code   Is the patient medically ready for discharge?:     Reason for patient still in hospital (select all that apply): Patient trending condition  Discharge Plan A: Rehab                  Angelic Kimball MD  Department of Hospital Medicine   [facility] - Neurosurgery ([facility])

## 2024-07-25 NOTE — SUBJECTIVE & OBJECTIVE
Interval History:   7/25: Patient still with some lower back pain. Discussed that MRI findings are non-operative at this time. She lives alone and is having signficant trouble ambulating safely. She is open to SNF placement    Review of Systems  All systems were reviewed and remain negative.   Objective:     Vital Signs (Most Recent):  Temp: 98.9 °F (37.2 °C) (07/25/24 1514)  Pulse: 69 (07/25/24 1514)  Resp: 18 (07/25/24 1514)  BP: 118/64 (07/25/24 1514)  SpO2: (!) 93 % (07/25/24 1514) Vital Signs (24h Range):  Temp:  [97.9 °F (36.6 °C)-98.9 °F (37.2 °C)] 98.9 °F (37.2 °C)  Pulse:  [] 69  Resp:  [18-20] 18  SpO2:  [93 %-98 %] 93 %  BP: (109-160)/(59-77) 118/64     Weight: 112.3 kg (247 lb 9.2 oz)  Body mass index is 39.96 kg/m².  No intake or output data in the 24 hours ending 07/25/24 1613      Physical Exam  Vitals and nursing note reviewed.   Constitutional:       General: She is not in acute distress.  HENT:      Right Ear: External ear normal.      Left Ear: External ear normal.      Nose: Nose normal.      Mouth/Throat:      Pharynx: No oropharyngeal exudate.   Eyes:      Extraocular Movements: Extraocular movements intact.      Pupils: Pupils are equal, round, and reactive to light.   Cardiovascular:      Rate and Rhythm: Normal rate and regular rhythm.      Pulses: Normal pulses.      Heart sounds: No murmur heard.  Pulmonary:      Effort: No respiratory distress.   Chest:      Chest wall: No tenderness.   Abdominal:      General: Abdomen is flat. There is no distension.   Musculoskeletal:      Cervical back: No rigidity.   Neurological:      Mental Status: She is alert.             Significant Labs: All pertinent labs within the past 24 hours have been reviewed.    Significant Imaging: I have reviewed all pertinent imaging results/findings within the past 24 hours.

## 2024-07-26 PROCEDURE — 97530 THERAPEUTIC ACTIVITIES: CPT

## 2024-07-26 PROCEDURE — 63600175 PHARM REV CODE 636 W HCPCS: Mod: UD | Performed by: HOSPITALIST

## 2024-07-26 PROCEDURE — 11000001 HC ACUTE MED/SURG PRIVATE ROOM

## 2024-07-26 PROCEDURE — 25000003 PHARM REV CODE 250: Mod: UD | Performed by: HOSPITALIST

## 2024-07-26 RX ADMIN — MORPHINE SULFATE 2 MG: 2 INJECTION, SOLUTION INTRAMUSCULAR; INTRAVENOUS at 02:07

## 2024-07-26 RX ADMIN — MORPHINE SULFATE 2 MG: 2 INJECTION, SOLUTION INTRAMUSCULAR; INTRAVENOUS at 10:07

## 2024-07-26 RX ADMIN — PREGABALIN 100 MG: 50 CAPSULE ORAL at 02:07

## 2024-07-26 RX ADMIN — PREGABALIN 100 MG: 50 CAPSULE ORAL at 10:07

## 2024-07-26 RX ADMIN — ALUMINUM HYDROXIDE, MAGNESIUM HYDROXIDE, AND SIMETHICONE 30 ML: 1200; 120; 1200 SUSPENSION ORAL at 08:07

## 2024-07-26 RX ADMIN — PREGABALIN 100 MG: 50 CAPSULE ORAL at 08:07

## 2024-07-26 RX ADMIN — PANTOPRAZOLE SODIUM 40 MG: 40 TABLET, DELAYED RELEASE ORAL at 10:07

## 2024-07-26 RX ADMIN — SUCRALFATE 1 G: 1 SUSPENSION ORAL at 05:07

## 2024-07-26 RX ADMIN — SODIUM CHLORIDE: 9 INJECTION, SOLUTION INTRAVENOUS at 10:07

## 2024-07-26 RX ADMIN — TRAMADOL HYDROCHLORIDE 50 MG: 50 TABLET, COATED ORAL at 05:07

## 2024-07-26 RX ADMIN — LOSARTAN POTASSIUM 25 MG: 25 TABLET, FILM COATED ORAL at 10:07

## 2024-07-26 RX ADMIN — ALUMINUM HYDROXIDE, MAGNESIUM HYDROXIDE, AND SIMETHICONE 30 ML: 1200; 120; 1200 SUSPENSION ORAL at 06:07

## 2024-07-26 RX ADMIN — SUCRALFATE 1 G: 1 SUSPENSION ORAL at 01:07

## 2024-07-26 RX ADMIN — MORPHINE SULFATE 2 MG: 2 INJECTION, SOLUTION INTRAMUSCULAR; INTRAVENOUS at 08:07

## 2024-07-26 RX ADMIN — ALUMINUM HYDROXIDE, MAGNESIUM HYDROXIDE, AND SIMETHICONE 30 ML: 1200; 120; 1200 SUSPENSION ORAL at 05:07

## 2024-07-26 RX ADMIN — QUETIAPINE FUMARATE 100 MG: 25 TABLET ORAL at 08:07

## 2024-07-26 RX ADMIN — MORPHINE SULFATE 2 MG: 2 INJECTION, SOLUTION INTRAMUSCULAR; INTRAVENOUS at 05:07

## 2024-07-26 RX ADMIN — CITALOPRAM HYDROBROMIDE 20 MG: 20 TABLET ORAL at 10:07

## 2024-07-26 RX ADMIN — MICONAZOLE NITRATE: 20 OINTMENT TOPICAL at 10:07

## 2024-07-26 RX ADMIN — SODIUM CHLORIDE: 9 INJECTION, SOLUTION INTRAVENOUS at 09:07

## 2024-07-26 NOTE — PT/OT/SLP PROGRESS
"Occupational Therapy   Treatment    Name: Dinora Anderson  MRN: [id number]  Admitting Diagnosis:  Central stenosis of spinal canal       Recommendations:     Discharge Recommendations: High Intensity Therapy  Discharge Equipment Recommendations:  bedside commode, walker, rolling, hip kit, bath bench  Barriers to discharge:  None    Assessment:     Dinora Anderson is a 58 y.o. female with a medical diagnosis of Central stenosis of spinal canal.  She presents with still difficulty with mobilizing R foot during functional mobility/ transfers. Patient with improved balance throughout standing ADLs today. Performance deficits affecting function are weakness, impaired endurance, impaired self care skills, impaired functional mobility, decreased lower extremity function, gait instability, impaired balance. Patient has demonstrated sufficient progression to warrant high intensity therapy evidenced by objectives noted below.     Rehab Prognosis:  Good; patient would benefit from acute skilled OT services to address these deficits and reach maximum level of function.       Plan:     Patient to be seen 4 x/week to address the above listed problems via self-care/home management, therapeutic activities, therapeutic exercises, neuromuscular re-education  Plan of Care Expires: 08/24/24  Plan of Care Reviewed with: patient    Subjective     Chief Complaint: "I'm just laughing. I don't know why. I guess its funny that my leg doesn't work"  Patient/Family Comments/goals: get better  Pain/Comfort:  Pain Rating 1: 2/10  Location 1: back  Pain Addressed 1: Reposition, Distraction, Pre-medicate for activity  Pain Rating Post-Intervention 1: 2/10    Objective:   Additional Staff Present: Therapy tech utilized during session due to patient complexity and required physical assistance to ensure patient safety    Communicated with: nursing prior to session.  Patient found HOB elevated with avasys,telemetry, PureWick upon OT entry to " room.    General Precautions: Standard, fall    Orthopedic Precautions:N/A  Braces: N/A  Respiratory Status: Room air     Occupational Performance:     Bed Mobility:    Patient completed Supine to Sit with minimum assistance     Functional Mobility/Transfers: Gait belt utilized during session for patient safety  Patient completed Sit <> Stand Transfer with contact guard assistance  with  rolling walker x3 attempts  Functional Mobility: patient ambulated ~12 ft with RW and CGA; difficulty advancing R LE    Activities of Daily Living:  Grooming: contact guard assistance to brush teeth in stance  Upper body dressing: Contact guard assitance to don gown like jacket      [facility] 6 Click ADL: 19    Treatment & Education:  Patient completed x5 standing marching with min A    Patient educated on:   -purpose of OT and OT POC  -facilitation and education on proper body mechanics, energy conservation, and safety  -importance of early mobility and out of bed activities with staff assist  -overall benefits of therapy     All questions answered within OT scope and to patient's satisfaction    Patient left up in chair with all lines intact, call button in reach, and chair alarm on    GOALS:   Multidisciplinary Problems       Occupational Therapy Goals          Problem: Occupational Therapy    Goal Priority Disciplines Outcome Interventions   Occupational Therapy Goal     OT, PT/OT Progressing    Description: Goals to be met by: 8/24/24     Patient will increase functional independence with ADLs by performing:    LE Dressing with Supervision with AD if needed.  Grooming while standing at sink with Stand-by Assistance.  Toileting from toilet with Contact Guard Assistance for hygiene and clothing management.   Supine to sit with Stand-by Assistance.  Toilet transfer to toilet with Contact Guard Assistance.    DME Justifications (see above for complete DME recommendations)    Bedside Commode- Patient has a mobility limitation that  significantly impairs their ability to participate in one or more mobility related activities of daily living, including toileting. This deficit can be resolved by using a bedside commode. Patient demonstrates mobility limitations that will cause them to be confined to one room at home without bathroom access for up to 30 days. Using a bedside commode will greatly improve the patient's ability to participate in MRADLs.     Rolling Walker- Patient demonstrates a mobility limitation that significantly impairs their ability to participate in one or more mobility related activities of daily living. Patient's mobility limitation cannot be sufficiently resolved with the use of a cane, but can be sufficiently resolved with the use of a rolling walker.The use of a rolling walker will considerably improve their ability to participate in MRADLs. Patient will use the walker on a regular basis at home.                             Time Tracking:     OT Date of Treatment: 07/26/24  OT Start Time: 1448  OT Stop Time: 1509  OT Total Time (min): 21 min    Billable Minutes:Therapeutic Activity 21    OT/MOOK: OT     Number of MOOK visits since last OT visit: 0    7/26/2024

## 2024-07-26 NOTE — SUBJECTIVE & OBJECTIVE
Interval History:   7/26: Pain more manageable today but still significant issues with mobility    Review of Systems   All other systems reviewed and are negative.    Objective:     Vital Signs (Most Recent):  Temp: 98.3 °F (36.8 °C) (07/26/24 1158)  Pulse: 65 (07/26/24 1158)  Resp: 18 (07/26/24 1158)  BP: (!) 145/93 (07/26/24 1158)  SpO2: 97 % (07/26/24 1158) Vital Signs (24h Range):  Temp:  [98.1 °F (36.7 °C)-99 °F (37.2 °C)] 98.3 °F (36.8 °C)  Pulse:  [65-74] 65  Resp:  [16-18] 18  SpO2:  [89 %-97 %] 97 %  BP: (118-162)/(64-93) 145/93     Weight: 112.3 kg (247 lb 9.2 oz)  Body mass index is 39.96 kg/m².    Intake/Output Summary (Last 24 hours) at 7/26/2024 1435  Last data filed at 7/26/2024 0621  Gross per 24 hour   Intake --   Output 700 ml   Net -700 ml         Physical Exam  Vitals and nursing note reviewed.   Constitutional:       General: She is not in acute distress.  HENT:      Right Ear: External ear normal.      Left Ear: External ear normal.      Nose: Nose normal.      Mouth/Throat:      Pharynx: No oropharyngeal exudate.   Eyes:      Extraocular Movements: Extraocular movements intact.      Pupils: Pupils are equal, round, and reactive to light.   Cardiovascular:      Rate and Rhythm: Normal rate and regular rhythm.      Pulses: Normal pulses.      Heart sounds: No murmur heard.  Pulmonary:      Effort: No respiratory distress.   Chest:      Chest wall: No tenderness.   Abdominal:      General: Abdomen is flat. There is no distension.   Musculoskeletal:      Cervical back: No rigidity.   Neurological:      Mental Status: She is alert.             Significant Labs: All pertinent labs within the past 24 hours have been reviewed.    Significant Imaging: I have reviewed all pertinent imaging results/findings within the past 24 hours.

## 2024-07-26 NOTE — PROGRESS NOTES
[facility] - Neurosurgery ([facility])  [facility] Medicine  Progress Note    Patient Name: Dinora Anderson  MRN: [id number]  Patient Class: IP- Inpatient   Admission Date: 7/22/2024  Length of Stay: 4 days  Attending Physician: Angelic Kimball MD  Primary Care Provider: Monroe Ly MD        Subjective:     Principal Problem:Central stenosis of spinal canal        HPI:  58-year-old white female admitted as a transfer from [facility] for possible cauda equina syndrome.  Patient reports being in her usual state of health sometime up until 2 days ago when she had a fall at home landing on her right side.  She thinks she fell by simply tripping on her right foot which has been dragging for some time but she is not completely sure.  She does not recall having any dizziness prior to the fall but may have been dizzy either during and/or after the fall.  Prior to the fall there was no nausea headaches or vision changes.  No chest pains or shortness a breath.  She reports being unable to have the strength to get up and she just laid on the floor until Sunday morning when her neighbor came in and called EMS.  She reports having an impaired gait since her surgery in November of last year.  She also reports having worsening weakness and upper and lower extremities in the last few weeks.  She does report having chronic tingling sensation in the right lower extremity.  Patient reports having ongoing urge incontinence for both urine and stool that has been present since the last surgery.  She affirms having a sensation of urge but is unable to hold relieving herself before getting to the restroom.  Reports having frequent accidents.    Denies any history of cancer but does affirm currently smoking.  Denies any history of stroke or CAD.    OSH CT of the lumbar spine demonstrates aforementioned hardware in satisfactory position. A CT of her head does demonstrate an area of hypoattenuation in the left frontal lobe,  likely representing vasogenic edema. CT c spine read as  C5-C6 severe spinal canal stenosis. Tried to undergo MRI  but was unable to do so due to claustrophobia.  Transferred here to due concerns for cauda equina and NSGY eval.     Overview/Hospital Course:  MRI head unremarkable for any actual brain lesion, felt that CT head was more of a artifactual lesion.  MRI L-spine showed Multilevel spondylosis, most advanced at L3-L4 with moderate spinal canal stenosis and moderate left neural foraminal narrowing for which Neurosurgery affirmed no acute intervention at this time.    Interval History:   7/26: Pain more manageable today but still significant issues with mobility    Review of Systems   All other systems reviewed and are negative.    Objective:     Vital Signs (Most Recent):  Temp: 98.3 °F (36.8 °C) (07/26/24 1158)  Pulse: 65 (07/26/24 1158)  Resp: 18 (07/26/24 1158)  BP: (!) 145/93 (07/26/24 1158)  SpO2: 97 % (07/26/24 1158) Vital Signs (24h Range):  Temp:  [98.1 °F (36.7 °C)-99 °F (37.2 °C)] 98.3 °F (36.8 °C)  Pulse:  [65-74] 65  Resp:  [16-18] 18  SpO2:  [89 %-97 %] 97 %  BP: (118-162)/(64-93) 145/93     Weight: 112.3 kg (247 lb 9.2 oz)  Body mass index is 39.96 kg/m².    Intake/Output Summary (Last 24 hours) at 7/26/2024 1435  Last data filed at 7/26/2024 0621  Gross per 24 hour   Intake --   Output 700 ml   Net -700 ml         Physical Exam  Vitals and nursing note reviewed.   Constitutional:       General: She is not in acute distress.  HENT:      Right Ear: External ear normal.      Left Ear: External ear normal.      Nose: Nose normal.      Mouth/Throat:      Pharynx: No oropharyngeal exudate.   Eyes:      Extraocular Movements: Extraocular movements intact.      Pupils: Pupils are equal, round, and reactive to light.   Cardiovascular:      Rate and Rhythm: Normal rate and regular rhythm.      Pulses: Normal pulses.      Heart sounds: No murmur heard.  Pulmonary:      Effort: No respiratory distress.    Chest:      Chest wall: No tenderness.   Abdominal:      General: Abdomen is flat. There is no distension.   Musculoskeletal:      Cervical back: No rigidity.   Neurological:      Mental Status: She is alert.             Significant Labs: All pertinent labs within the past 24 hours have been reviewed.    Significant Imaging: I have reviewed all pertinent imaging results/findings within the past 24 hours.    Assessment/Plan:      * Central stenosis of spinal canal  Transferred for evaluation of cauda equina syndrome   Noted on CT C-spine  History of L5-S1 right-sided fusion  MRI L-spine showed Multilevel spondylosis, most advanced at L3-L4 with moderate spinal canal stenosis and moderate left neural foraminal narrowing which Neurosurgery affirmed no acute intervention at this time  MRI C and T-spine pending         Brain lesion  Noted on CT head showing Possible hypodensity of the left frontal lobe may represent cerebral edema or infarct   Neuro checks   MRI brain unremarkable, suspect CT finding is artifact      Fall  Ground level fall  Unclear of mechanical vs neurogenic, treat underlying issues, see above  Pt/ot  Neurochecks        Lumbar radiculopathy, chronic  Lyrica      Essential hypertension  Losartan    Bipolar 1 disorder, depressed, moderate  Seroquel  lexapro        VTE Risk Mitigation (From admission, onward)           Ordered     IP VTE HIGH RISK PATIENT  Once         07/22/24 0651     Place sequential compression device  Until discontinued         07/22/24 0651                    Discharge Planning   GLENNA: 7/29/2024     Code Status: Full Code   Is the patient medically ready for discharge?:     Reason for patient still in hospital (select all that apply): Patient trending condition  Discharge Plan A: Rehab                  Angelic Kimball MD  Department of Hospital Medicine   [facility] Neurosurgery ([facility])

## 2024-07-26 NOTE — NURSING
Nurses Note -- 4 Eyes      7/25/2024   10:57 PM      Skin assessed during: Q Shift Change      [x] No Altered Skin Integrity Present    []Prevention Measures Documented      [] Yes- Altered Skin Integrity Present or Discovered   [] LDA Added if Not in Epic (Describe Wound)   [] New Altered Skin Integrity was Present on Admit and Documented in LDA   [] Wound Image Taken    Wound Care Consulted? No    Attending Nurse:  MIKAYLA Silva    Second RN/Staff Member:  MIKAYLA Chandra

## 2024-07-26 NOTE — NURSING
Nurses Note -- 4 Eyes      7/26/2024   0705am      Skin assessed during: Q Shift Change      [x] No Altered Skin Integrity Present    [x]Prevention Measures Documented      [] Yes- Altered Skin Integrity Present or Discovered   [] LDA Added if Not in Epic (Describe Wound)   [] New Altered Skin Integrity was Present on Admit and Documented in LDA   [] Wound Image Taken    Wound Care Consulted? No    Attending Nurse:  MIKAYLA Choudhury    Second RN/Staff Member:  MIKAYLA Pastor

## 2024-07-26 NOTE — PLAN OF CARE
ASHLEY called [facility] [phone]   and left a message for admissions to call ASHLEY back.     1868 ASHLEY spoke with Brittney at [facility] and she stated she is reviewing patient and will come to the hospital today to meet with patient. ASHLEY notified Brittney patient is medially ready.

## 2024-07-27 PROBLEM — Z74.09 REDUCED MOBILITY: Status: ACTIVE | Noted: 2024-07-27

## 2024-07-27 PROCEDURE — 25000003 PHARM REV CODE 250: Performed by: HOSPITALIST

## 2024-07-27 PROCEDURE — 11000001 HC ACUTE MED/SURG PRIVATE ROOM

## 2024-07-27 PROCEDURE — 63600175 PHARM REV CODE 636 W HCPCS: Mod: UD | Performed by: HOSPITALIST

## 2024-07-27 RX ORDER — LIDOCAINE 50 MG/G
2 PATCH TOPICAL DAILY
Status: DISCONTINUED | OUTPATIENT
Start: 2024-07-27 | End: 2024-07-29 | Stop reason: HOSPADM

## 2024-07-27 RX ORDER — GABAPENTIN 300 MG/1
300 CAPSULE ORAL 3 TIMES DAILY
Status: DISCONTINUED | OUTPATIENT
Start: 2024-07-27 | End: 2024-07-27

## 2024-07-27 RX ORDER — METHOCARBAMOL 500 MG/1
500 TABLET, FILM COATED ORAL 4 TIMES DAILY PRN
Status: DISCONTINUED | OUTPATIENT
Start: 2024-07-27 | End: 2024-07-29 | Stop reason: HOSPADM

## 2024-07-27 RX ORDER — ACETAMINOPHEN 500 MG
1000 TABLET ORAL EVERY 8 HOURS
Status: DISCONTINUED | OUTPATIENT
Start: 2024-07-27 | End: 2024-07-29 | Stop reason: HOSPADM

## 2024-07-27 RX ORDER — MAGNESIUM SULFATE HEPTAHYDRATE 40 MG/ML
2 INJECTION, SOLUTION INTRAVENOUS ONCE
Status: COMPLETED | OUTPATIENT
Start: 2024-07-27 | End: 2024-07-27

## 2024-07-27 RX ORDER — GABAPENTIN 300 MG/1
300 CAPSULE ORAL NIGHTLY
Status: DISCONTINUED | OUTPATIENT
Start: 2024-07-27 | End: 2024-07-27

## 2024-07-27 RX ADMIN — PREGABALIN 100 MG: 50 CAPSULE ORAL at 08:07

## 2024-07-27 RX ADMIN — CITALOPRAM HYDROBROMIDE 20 MG: 20 TABLET ORAL at 08:07

## 2024-07-27 RX ADMIN — MORPHINE SULFATE 2 MG: 2 INJECTION, SOLUTION INTRAMUSCULAR; INTRAVENOUS at 08:07

## 2024-07-27 RX ADMIN — ACETAMINOPHEN 1000 MG: 500 TABLET ORAL at 10:07

## 2024-07-27 RX ADMIN — PREGABALIN 100 MG: 50 CAPSULE ORAL at 03:07

## 2024-07-27 RX ADMIN — MORPHINE SULFATE 2 MG: 2 INJECTION, SOLUTION INTRAMUSCULAR; INTRAVENOUS at 04:07

## 2024-07-27 RX ADMIN — TRAMADOL HYDROCHLORIDE 50 MG: 50 TABLET, COATED ORAL at 03:07

## 2024-07-27 RX ADMIN — QUETIAPINE FUMARATE 100 MG: 25 TABLET ORAL at 08:07

## 2024-07-27 RX ADMIN — PANTOPRAZOLE SODIUM 40 MG: 40 TABLET, DELAYED RELEASE ORAL at 08:07

## 2024-07-27 RX ADMIN — TRAMADOL HYDROCHLORIDE 50 MG: 50 TABLET, COATED ORAL at 12:07

## 2024-07-27 RX ADMIN — MAGNESIUM SULFATE HEPTAHYDRATE 2 G: 40 INJECTION, SOLUTION INTRAVENOUS at 11:07

## 2024-07-27 RX ADMIN — LIDOCAINE 5% 2 PATCH: 700 PATCH TOPICAL at 11:07

## 2024-07-27 RX ADMIN — MICONAZOLE NITRATE: 20 OINTMENT TOPICAL at 08:07

## 2024-07-27 RX ADMIN — METHOCARBAMOL 500 MG: 500 TABLET ORAL at 04:07

## 2024-07-27 RX ADMIN — LOSARTAN POTASSIUM 25 MG: 25 TABLET, FILM COATED ORAL at 08:07

## 2024-07-27 RX ADMIN — ACETAMINOPHEN 1000 MG: 500 TABLET ORAL at 11:07

## 2024-07-27 RX ADMIN — ALUMINUM HYDROXIDE, MAGNESIUM HYDROXIDE, AND SIMETHICONE 30 ML: 1200; 120; 1200 SUSPENSION ORAL at 11:07

## 2024-07-27 RX ADMIN — MORPHINE SULFATE 2 MG: 2 INJECTION, SOLUTION INTRAMUSCULAR; INTRAVENOUS at 01:07

## 2024-07-27 RX ADMIN — SUCRALFATE 1 G: 1 SUSPENSION ORAL at 11:07

## 2024-07-27 NOTE — NURSING TRANSFER
Patient Transferred to NPU Room 929      Upon arrival to the floor, patient greeted and oriented to room. Complete head to toe assessment completed per protocol. VSS, see flowsheet for details. Neuro assessment completed. Primary team notified of patient's transfer to floor. All current and transfer orders reviewed/reconciled per protocol. All emergency equipment set up in patient's room. Fall/seizure precautions initiated per providers orders. 4 Eyes skin assessment performed, see below for details. Reviewed assessment and rounding frequency with patient and family. Questions were encouraged and addressed. Repositioned patient for comfort with bed locked in lowest position, side rails up x 2, bed alarm set, and call light within reach. Instructed patient to call staff for mobility/assistance, verbalized understanding. No acute signs of distress noted at this time.         Nurses Note -- 4 Eyes      7/27/2024   6:15 PM      Skin assessed during: Transfer      [] No Altered Skin Integrity Present    []Prevention Measures Documented      [x] Yes- Altered Skin Integrity Present or Discovered   [x] LDA Added if Not in Epic (Describe Wound)   [] New Altered Skin Integrity was Present on Admit and Documented in LDA   [] Wound Image Taken    Wound Care Consulted? No- laceration from fall on back of head with 1 stitch    Attending Nurse:  MIKAYLA Choudhury    Second RN/Staff Member:  MIKAYLA Pastor

## 2024-07-27 NOTE — NURSING
Nurses Note -- 4 Eyes      7/27/2024   0705AM      Skin assessed during: Q Shift Change      [x] No Altered Skin Integrity Present    [x]Prevention Measures Documented      [] Yes- Altered Skin Integrity Present or Discovered   [] LDA Added if Not in Epic (Describe Wound)   [] New Altered Skin Integrity was Present on Admit and Documented in LDA   [] Wound Image Taken    Wound Care Consulted? No    Attending Nurse:  MIKAYLA Choudhury    Second RN/Staff Member:  MIKAYLA Pastor

## 2024-07-27 NOTE — PLAN OF CARE
Problem: Adult Inpatient Plan of Care  Goal: Plan of Care Review  7/27/2024 1834 by Kei Brooks RN  Outcome: Progressing  7/27/2024 1833 by Kei Brooks RN  Outcome: Progressing  Goal: Patient-Specific Goal (Individualized)  7/27/2024 1834 by Kei Brooks RN  Outcome: Progressing  7/27/2024 1833 by Kei Brooks RN  Outcome: Progressing  Goal: Absence of Hospital-Acquired Illness or Injury  7/27/2024 1834 by Kei Brooks RN  Outcome: Progressing  7/27/2024 1833 by Kei Brooks RN  Outcome: Progressing  Goal: Optimal Comfort and Wellbeing  7/27/2024 1834 by Kei Brooks RN  Outcome: Progressing  7/27/2024 1833 by Kei Brooks RN  Outcome: Progressing  Goal: Readiness for Transition of Care  7/27/2024 1834 by Kei Brooks RN  Outcome: Progressing  7/27/2024 1833 by Kei Brooks RN  Outcome: Progressing     Problem: Bariatric Environmental Safety  Goal: Safety Maintained with Care  7/27/2024 1834 by Kei Brooks RN  Outcome: Progressing  7/27/2024 1833 by Kei Brooks RN  Outcome: Progressing     Problem: Fall Injury Risk  Goal: Absence of Fall and Fall-Related Injury  7/27/2024 1834 by Kei Brooks RN  Outcome: Progressing  7/27/2024 1833 by Kei Brooks RN  Outcome: Progressing     Problem: Skin Injury Risk Increased  Goal: Skin Health and Integrity  7/27/2024 1834 by Kei Brooks RN  Outcome: Progressing  7/27/2024 1833 by Kei Brooks RN  Outcome: Progressing

## 2024-07-27 NOTE — PLAN OF CARE
Problem: Adult Inpatient Plan of Care  Goal: Plan of Care Review  Outcome: Progressing  Goal: Optimal Comfort and Wellbeing  Outcome: Progressing  Goal: Readiness for Transition of Care  Outcome: Progressing     Problem: Fall Injury Risk  Goal: Absence of Fall and Fall-Related Injury  Outcome: Progressing   Patient alert and Oriented x4. C/O back pain; Medicated x 3 during shift. Safety measures maintained. Call light within reach; Bed in low position; SCDS on; Purewick cath in progress. Repositioning Q2H encouraged and maintained. IV fluid NS continued. POC ongoing.

## 2024-07-27 NOTE — CARE UPDATE
I have reviewed the chart of Dinora Anderson who is hospitalized for the following:    Active Hospital Problems    Diagnosis    *Central stenosis of spinal canal    Reduced mobility    Thrombocytopenia     Monitor with daily cbc        Lumbar myelopathy    Nicotine dependence    Severe obesity (BMI >= 40)    Drug dependence     THC + on drug screen  [facility] on cessation       Pain    Sensory loss    Fall    Brain lesion    Lumbar radiculopathy, chronic    Essential hypertension    Schizophrenia    Anxiety disorder    Bipolar 1 disorder, depressed, moderate        Lupis Multani NP  Unit Based SARITHA

## 2024-07-28 PROCEDURE — 25000003 PHARM REV CODE 250: Performed by: HOSPITALIST

## 2024-07-28 PROCEDURE — 63600175 PHARM REV CODE 636 W HCPCS: Mod: UD | Performed by: HOSPITALIST

## 2024-07-28 PROCEDURE — 97530 THERAPEUTIC ACTIVITIES: CPT | Mod: CQ

## 2024-07-28 PROCEDURE — 97116 GAIT TRAINING THERAPY: CPT | Mod: CQ

## 2024-07-28 PROCEDURE — 11000001 HC ACUTE MED/SURG PRIVATE ROOM

## 2024-07-28 RX ADMIN — PREGABALIN 100 MG: 50 CAPSULE ORAL at 09:07

## 2024-07-28 RX ADMIN — Medication 6 MG: at 01:07

## 2024-07-28 RX ADMIN — MORPHINE SULFATE 2 MG: 2 INJECTION, SOLUTION INTRAMUSCULAR; INTRAVENOUS at 08:07

## 2024-07-28 RX ADMIN — MORPHINE SULFATE 2 MG: 2 INJECTION, SOLUTION INTRAMUSCULAR; INTRAVENOUS at 11:07

## 2024-07-28 RX ADMIN — METHOCARBAMOL 500 MG: 500 TABLET ORAL at 09:07

## 2024-07-28 RX ADMIN — TRAMADOL HYDROCHLORIDE 50 MG: 50 TABLET, COATED ORAL at 01:07

## 2024-07-28 RX ADMIN — PANTOPRAZOLE SODIUM 40 MG: 40 TABLET, DELAYED RELEASE ORAL at 08:07

## 2024-07-28 RX ADMIN — LOSARTAN POTASSIUM 25 MG: 25 TABLET, FILM COATED ORAL at 08:07

## 2024-07-28 RX ADMIN — MORPHINE SULFATE 2 MG: 2 INJECTION, SOLUTION INTRAMUSCULAR; INTRAVENOUS at 05:07

## 2024-07-28 RX ADMIN — PREGABALIN 100 MG: 50 CAPSULE ORAL at 08:07

## 2024-07-28 RX ADMIN — ACETAMINOPHEN 1000 MG: 500 TABLET ORAL at 09:07

## 2024-07-28 RX ADMIN — ACETAMINOPHEN 1000 MG: 500 TABLET ORAL at 02:07

## 2024-07-28 RX ADMIN — PREGABALIN 100 MG: 50 CAPSULE ORAL at 02:07

## 2024-07-28 RX ADMIN — TRAMADOL HYDROCHLORIDE 50 MG: 50 TABLET, COATED ORAL at 03:07

## 2024-07-28 RX ADMIN — ACETAMINOPHEN 1000 MG: 500 TABLET ORAL at 06:07

## 2024-07-28 RX ADMIN — MICONAZOLE NITRATE: 20 OINTMENT TOPICAL at 08:07

## 2024-07-28 RX ADMIN — MORPHINE SULFATE 2 MG: 2 INJECTION, SOLUTION INTRAMUSCULAR; INTRAVENOUS at 03:07

## 2024-07-28 RX ADMIN — QUETIAPINE FUMARATE 100 MG: 25 TABLET ORAL at 09:07

## 2024-07-28 RX ADMIN — Medication 6 MG: at 09:07

## 2024-07-28 RX ADMIN — LIDOCAINE 5% 2 PATCH: 700 PATCH TOPICAL at 08:07

## 2024-07-28 RX ADMIN — MICONAZOLE NITRATE: 20 OINTMENT TOPICAL at 09:07

## 2024-07-28 RX ADMIN — CITALOPRAM HYDROBROMIDE 20 MG: 20 TABLET ORAL at 08:07

## 2024-07-28 RX ADMIN — METHOCARBAMOL 500 MG: 500 TABLET ORAL at 01:07

## 2024-07-28 NOTE — PROGRESS NOTES
Domenic [facility] - Neurosurgery ([facility])  [facility] Medicine  Progress Note    Patient Name: Dinora Anderson  MRN: [id number]  Patient Class: IP- Inpatient   Admission Date: 7/22/2024  Length of Stay: 6 days  Attending Physician: Angelic Kimball MD  Primary Care Provider: Monroe Ly MD        Subjective:     Principal Problem:Central stenosis of spinal canal        HPI:  58-year-old white female admitted as a transfer from [facility] for possible cauda equina syndrome.  Patient reports being in her usual state of health sometime up until 2 days ago when she had a fall at home landing on her right side.  She thinks she fell by simply tripping on her right foot which has been dragging for some time but she is not completely sure.  She does not recall having any dizziness prior to the fall but may have been dizzy either during and/or after the fall.  Prior to the fall there was no nausea headaches or vision changes.  No chest pains or shortness a breath.  She reports being unable to have the strength to get up and she just laid on the floor until Sunday morning when her neighbor came in and called EMS.  She reports having an impaired gait since her surgery in November of last year.  She also reports having worsening weakness and upper and lower extremities in the last few weeks.  She does report having chronic tingling sensation in the right lower extremity.  Patient reports having ongoing urge incontinence for both urine and stool that has been present since the last surgery.  She affirms having a sensation of urge but is unable to hold relieving herself before getting to the restroom.  Reports having frequent accidents.    Denies any history of cancer but does affirm currently smoking.  Denies any history of stroke or CAD.    OSH CT of the lumbar spine demonstrates aforementioned hardware in satisfactory position. A CT of her head does demonstrate an area of hypoattenuation in the left frontal lobe,  likely representing vasogenic edema. CT c spine read as  C5-C6 severe spinal canal stenosis. Tried to undergo MRI  but was unable to do so due to claustrophobia.  Transferred here to due concerns for cauda equina and NSGY eval.     Overview/Hospital Course:  MRI head unremarkable for any actual brain lesion, felt that CT head was more of a artifactual lesion.  MRI L-spine showed Multilevel spondylosis, most advanced at L3-L4 with moderate spinal canal stenosis and moderate left neural foraminal narrowing for which Neurosurgery affirmed no acute intervention at this time.    Interval History:   7/28: patient feels that her pain is better controllled.     Review of Systems   All other systems reviewed and are negative.    Objective:     Vital Signs (Most Recent):  Temp: 98.2 °F (36.8 °C) (07/28/24 1216)  Pulse: (!) 57 (07/28/24 1216)  Resp: 18 (07/28/24 1216)  BP: (!) 150/76 (07/28/24 1216)  SpO2: (!) 91 % (07/28/24 1216) Vital Signs (24h Range):  Temp:  [97.6 °F (36.4 °C)-98.4 °F (36.9 °C)] 98.2 °F (36.8 °C)  Pulse:  [57-68] 57  Resp:  [16-20] 18  SpO2:  [91 %-96 %] 91 %  BP: (108-173)/(56-85) 150/76     Weight: 112.3 kg (247 lb 9.2 oz)  Body mass index is 39.96 kg/m².    Intake/Output Summary (Last 24 hours) at 7/28/2024 1422  Last data filed at 7/28/2024 0400  Gross per 24 hour   Intake --   Output 900 ml   Net -900 ml         Physical Exam  Vitals and nursing note reviewed.   Constitutional:       General: She is not in acute distress.  HENT:      Right Ear: External ear normal.      Left Ear: External ear normal.      Nose: Nose normal.      Mouth/Throat:      Pharynx: No oropharyngeal exudate.   Eyes:      Extraocular Movements: Extraocular movements intact.      Pupils: Pupils are equal, round, and reactive to light.   Cardiovascular:      Rate and Rhythm: Normal rate and regular rhythm.      Pulses: Normal pulses.      Heart sounds: No murmur heard.  Pulmonary:      Effort: No respiratory distress.   Chest:       Chest wall: No tenderness.   Abdominal:      General: Abdomen is flat. There is no distension.   Musculoskeletal:      Cervical back: No rigidity.   Neurological:      Mental Status: She is alert.             Significant Labs: All pertinent labs within the past 24 hours have been reviewed.    Significant Imaging: I have reviewed all pertinent imaging results/findings within the past 24 hours.    Assessment/Plan:      * Central stenosis of spinal canal  Transferred for evaluation of cauda equina syndrome   Noted on CT C-spine  History of L5-S1 right-sided fusion  MRI L-spine showed Multilevel spondylosis, most advanced at L3-L4 with moderate spinal canal stenosis and moderate left neural foraminal narrowing which Neurosurgery affirmed no acute intervention at this time  MRI C and T-spine pending         Brain lesion  Noted on CT head showing Possible hypodensity of the left frontal lobe may represent cerebral edema or infarct   Neuro checks   MRI brain unremarkable, suspect CT finding is artifact      Fall  Ground level fall  Unclear of mechanical vs neurogenic, treat underlying issues, see above  Pt/ot  Neurochecks        Lumbar radiculopathy, chronic  Lyrica      Essential hypertension  Losartan    Bipolar 1 disorder, depressed, moderate  Seroquel  lexapro        VTE Risk Mitigation (From admission, onward)           Ordered     IP VTE HIGH RISK PATIENT  Once         07/22/24 0651     Place sequential compression device  Until discontinued         07/22/24 0651                    Discharge Planning   GLENNA: 7/29/2024     Code Status: Full Code   Is the patient medically ready for discharge?:     Reason for patient still in hospital (select all that apply): Patient trending condition  Discharge Plan A: Rehab                  Angelic Kimball MD  Department of Hospital Medicine   [facility] Neurosurgery ([facility])

## 2024-07-28 NOTE — PROGRESS NOTES
[facility]omenic [facility] - Neurosurgery ([facility])  [facility] Medicine  Progress Note    Patient Name: Dinora Anderson  MRN: [id number]  Patient Class: IP- Inpatient   Admission Date: 7/22/2024  Length of Stay: 6 days  Attending Physician: Angelic Kimball MD  Primary Care Provider: Monroe Ly MD        Subjective:     Principal Problem:Central stenosis of spinal canal        HPI:  58-year-old white female admitted as a transfer from [facility] for possible cauda equina syndrome.  Patient reports being in her usual state of health sometime up until 2 days ago when she had a fall at home landing on her right side.  She thinks she fell by simply tripping on her right foot which has been dragging for some time but she is not completely sure.  She does not recall having any dizziness prior to the fall but may have been dizzy either during and/or after the fall.  Prior to the fall there was no nausea headaches or vision changes.  No chest pains or shortness a breath.  She reports being unable to have the strength to get up and she just laid on the floor until Sunday morning when her neighbor came in and called EMS.  She reports having an impaired gait since her surgery in November of last year.  She also reports having worsening weakness and upper and lower extremities in the last few weeks.  She does report having chronic tingling sensation in the right lower extremity.  Patient reports having ongoing urge incontinence for both urine and stool that has been present since the last surgery.  She affirms having a sensation of urge but is unable to hold relieving herself before getting to the restroom.  Reports having frequent accidents.    Denies any history of cancer but does affirm currently smoking.  Denies any history of stroke or CAD.    OSH CT of the lumbar spine demonstrates aforementioned hardware in satisfactory position. A CT of her head does demonstrate an area of hypoattenuation in the left frontal lobe,  likely representing vasogenic edema. CT c spine read as  C5-C6 severe spinal canal stenosis. Tried to undergo MRI  but was unable to do so due to claustrophobia.  Transferred here to due concerns for cauda equina and NSGY eval.     Overview/Hospital Course:  MRI head unremarkable for any actual brain lesion, felt that CT head was more of a artifactual lesion.  MRI L-spine showed Multilevel spondylosis, most advanced at L3-L4 with moderate spinal canal stenosis and moderate left neural foraminal narrowing for which Neurosurgery affirmed no acute intervention at this time.    Interval History:   7/27: discussed some mulitmodal pain management    Review of Systems   All other systems reviewed and are negative.    Objective:     Vital Signs (Most Recent):  Temp: 98.2 °F (36.8 °C) (07/28/24 1216)  Pulse: (!) 57 (07/28/24 1216)  Resp: 18 (07/28/24 1216)  BP: (!) 150/76 (07/28/24 1216)  SpO2: (!) 91 % (07/28/24 1216) Vital Signs (24h Range):  Temp:  [97.6 °F (36.4 °C)-98.4 °F (36.9 °C)] 98.2 °F (36.8 °C)  Pulse:  [57-68] 57  Resp:  [16-20] 18  SpO2:  [91 %-96 %] 91 %  BP: (108-173)/(56-85) 150/76     Weight: 112.3 kg (247 lb 9.2 oz)  Body mass index is 39.96 kg/m².    Intake/Output Summary (Last 24 hours) at 7/28/2024 1417  Last data filed at 7/28/2024 0400  Gross per 24 hour   Intake --   Output 900 ml   Net -900 ml         Physical Exam  Vitals and nursing note reviewed.   Constitutional:       General: She is not in acute distress.  HENT:      Right Ear: External ear normal.      Left Ear: External ear normal.      Nose: Nose normal.      Mouth/Throat:      Pharynx: No oropharyngeal exudate.   Eyes:      Extraocular Movements: Extraocular movements intact.      Pupils: Pupils are equal, round, and reactive to light.   Cardiovascular:      Rate and Rhythm: Normal rate and regular rhythm.      Pulses: Normal pulses.      Heart sounds: No murmur heard.  Pulmonary:      Effort: No respiratory distress.   Chest:      Chest  wall: No tenderness.   Abdominal:      General: Abdomen is flat. There is no distension.   Musculoskeletal:      Cervical back: No rigidity.   Neurological:      Mental Status: She is alert.             Significant Labs: All pertinent labs within the past 24 hours have been reviewed.    Significant Imaging: I have reviewed all pertinent imaging results/findings within the past 24 hours.    Assessment/Plan:      * Central stenosis of spinal canal  Transferred for evaluation of cauda equina syndrome   Noted on CT C-spine  History of L5-S1 right-sided fusion  MRI L-spine showed Multilevel spondylosis, most advanced at L3-L4 with moderate spinal canal stenosis and moderate left neural foraminal narrowing which Neurosurgery affirmed no acute intervention at this time  MRI C and T-spine pending         Brain lesion  Noted on CT head showing Possible hypodensity of the left frontal lobe may represent cerebral edema or infarct   Neuro checks   MRI brain unremarkable, suspect CT finding is artifact      Fall  Ground level fall  Unclear of mechanical vs neurogenic, treat underlying issues, see above  Pt/ot  Neurochecks        Lumbar radiculopathy, chronic  Lyrica      Essential hypertension  Losartan    Bipolar 1 disorder, depressed, moderate  Seroquel  lexapro        VTE Risk Mitigation (From admission, onward)           Ordered     IP VTE HIGH RISK PATIENT  Once         07/22/24 0651     Place sequential compression device  Until discontinued         07/22/24 0651                    Discharge Planning   GLENNA: 7/29/2024     Code Status: Full Code   Is the patient medically ready for discharge?:     Reason for patient still in hospital (select all that apply): Patient trending condition  Discharge Plan A: Rehab                  Angelic Kimball MD  Department of Hospital Medicine   [facility] Neurosurgery ([facility])

## 2024-07-28 NOTE — SUBJECTIVE & OBJECTIVE
Interval History:   7/28: patient feels that her pain is better controllled.     Review of Systems   All other systems reviewed and are negative.    Objective:     Vital Signs (Most Recent):  Temp: 98.2 °F (36.8 °C) (07/28/24 1216)  Pulse: (!) 57 (07/28/24 1216)  Resp: 18 (07/28/24 1216)  BP: (!) 150/76 (07/28/24 1216)  SpO2: (!) 91 % (07/28/24 1216) Vital Signs (24h Range):  Temp:  [97.6 °F (36.4 °C)-98.4 °F (36.9 °C)] 98.2 °F (36.8 °C)  Pulse:  [57-68] 57  Resp:  [16-20] 18  SpO2:  [91 %-96 %] 91 %  BP: (108-173)/(56-85) 150/76     Weight: 112.3 kg (247 lb 9.2 oz)  Body mass index is 39.96 kg/m².    Intake/Output Summary (Last 24 hours) at 7/28/2024 1422  Last data filed at 7/28/2024 0400  Gross per 24 hour   Intake --   Output 900 ml   Net -900 ml         Physical Exam  Vitals and nursing note reviewed.   Constitutional:       General: She is not in acute distress.  HENT:      Right Ear: External ear normal.      Left Ear: External ear normal.      Nose: Nose normal.      Mouth/Throat:      Pharynx: No oropharyngeal exudate.   Eyes:      Extraocular Movements: Extraocular movements intact.      Pupils: Pupils are equal, round, and reactive to light.   Cardiovascular:      Rate and Rhythm: Normal rate and regular rhythm.      Pulses: Normal pulses.      Heart sounds: No murmur heard.  Pulmonary:      Effort: No respiratory distress.   Chest:      Chest wall: No tenderness.   Abdominal:      General: Abdomen is flat. There is no distension.   Musculoskeletal:      Cervical back: No rigidity.   Neurological:      Mental Status: She is alert.             Significant Labs: All pertinent labs within the past 24 hours have been reviewed.    Significant Imaging: I have reviewed all pertinent imaging results/findings within the past 24 hours.

## 2024-07-28 NOTE — PT/OT/SLP PROGRESS
"Physical Therapy Treatment    Patient Name:  Dinora Anderson   MRN:  [id number]    Recommendations:     Discharge Recommendations: High Intensity Therapy  Discharge Equipment Recommendations: bedside commode, bath bench, grab bar, hip kit, walker, rolling  Barriers to discharge: Inaccessible home and Decreased caregiver support Pt requiring increased skilled assistance at current time.     Assessment:     Dinora Anderson is a 58 y.o. female admitted with a medical diagnosis of Central stenosis of spinal canal.  She presents with the following impairments/functional limitations: weakness, impaired endurance, impaired self care skills, impaired functional mobility, gait instability, impaired balance, decreased coordination, decreased upper extremity function, decreased lower extremity function, decreased safety awareness, pain, impaired coordination requiring min assistance and verbal cues for bed mob, scooting to EOB, sit < > stand transitions, and gait to prevent falls due to weakness, decreased B LE control/instability.   In light of pt's current functional level and deficits, it is anticipated that pt will need to participate in a high intensity rehab program consisting of PT and OT in order to achieve full rehab potential to return to previous level of function and roles.  Pt remains motivated to participate in PT session and will cont to benefit from skilled PT intervention..    Rehab Prognosis: Good; patient would benefit from acute skilled PT services to address these deficits and reach maximum level of function.    Recent Surgery: * No surgery found *      Plan:     During this hospitalization, patient to be seen 4 x/week to address the identified rehab impairments via gait training, therapeutic activities, therapeutic exercises, neuromuscular re-education and progress toward the following goals:    Plan of Care Expires:  08/24/24    Subjective     Chief Complaint: "I'm in pain"  Pain/Comfort:  Pain Rating 1:  " (no rating provided)  Location - Orientation 1: generalized  Location 1:  (back and legs)      Objective:     Communicated with nurse (Libia) prior to session.  Patient found HOB elevated with  (fall monitor camera, waffle pad.  No family present) upon PT entry to room.     General Precautions: Standard, fall  Orthopedic Precautions: N/A  Braces: N/A  Respiratory Status: Room air     Functional Mobility:  Bed Mobility:     Rolling Left:  stand by assistance  Scooting: anteriorly to the EOB with SBA  Supine to Sit: min A for trunk elevation and to guide LE's, exiting on the L side, via logroll to protect back, HOB flat, no use of rail  Transfers:     Sit to Stand:  from EOB/BSC over toilet seat with min A with rolling walker  Gait: RW min A for balance and R knee control 12ft within room and then 22ft in hallway with chair follow.  Pt demonstrates step to gait pattern, decreased B step length, and instability  Balance: static sitting at the EOB with SBA; static standing with RW CGA      AM-PAC 6 CLICK MOBILITY  Turning over in bed (including adjusting bedclothes, sheets and blankets)?: 3  Sitting down on and standing up from a chair with arms (e.g., wheelchair, bedside commode, etc.): 3  Moving from lying on back to sitting on the side of the bed?: 3  Moving to and from a bed to a chair (including a wheelchair)?: 3  Need to walk in hospital room?: 3  Climbing 3-5 steps with a railing?: 2  Basic Mobility Total Score: 17       Treatment & Education:  Patient provided with daily orientation and goals of this PT session. They were educated to call for assistance and to transfer with hospital staff only.  Also, pt was educated on the effects of prolonged immobility and the importance of performing OOB activity and exercises to promote healing and reduce recovery time    Patient left up in chair with all lines intact, call button in reach, chair alarm on, and nurse notified..    GOALS:   Multidisciplinary Problems        Physical Therapy Goals          Problem: Physical Therapy    Goal Priority Disciplines Outcome Goal Variances Interventions   Physical Therapy Goal     PT, PT/OT Progressing     Description: Goals to be met by: 24     Patient will increase functional independence with mobility by performin. Supine <> sit with [place]  2. Rolling to Left and Right with [place].  3. Sit <> stand transfer with Modified [place] with LRAD  4. Bed <> chair transfer with Modified [place] using LRAD  5. Gait  x 100 feet with Modified [place] using LRAD   6. Ascend/descend 5 stair with left Handrails Stand-by Assistance with or without LRAD   7. Sitting at edge of bed 10 minutes with [place]  8. Stand for 8 minutes with Modified [place] with LRAD  9. Lower extremity exercise program x30 reps per handout, with assistance as needed                         Time Tracking:     PT Received On: 24  PT Start Time: 1322     PT Stop Time: 1345  PT Total Time (min): 23 min     Billable Minutes: Gait Training 15 and Therapeutic Activity 8    Treatment Type: Treatment  PT/PTA: PTA     Number of PTA visits since last PT visit: 2024

## 2024-07-28 NOTE — SUBJECTIVE & OBJECTIVE
Interval History:   7/27: discussed some mulitmodal pain management    Review of Systems   All other systems reviewed and are negative.    Objective:     Vital Signs (Most Recent):  Temp: 98.2 °F (36.8 °C) (07/28/24 1216)  Pulse: (!) 57 (07/28/24 1216)  Resp: 18 (07/28/24 1216)  BP: (!) 150/76 (07/28/24 1216)  SpO2: (!) 91 % (07/28/24 1216) Vital Signs (24h Range):  Temp:  [97.6 °F (36.4 °C)-98.4 °F (36.9 °C)] 98.2 °F (36.8 °C)  Pulse:  [57-68] 57  Resp:  [16-20] 18  SpO2:  [91 %-96 %] 91 %  BP: (108-173)/(56-85) 150/76     Weight: 112.3 kg (247 lb 9.2 oz)  Body mass index is 39.96 kg/m².    Intake/Output Summary (Last 24 hours) at 7/28/2024 1417  Last data filed at 7/28/2024 0400  Gross per 24 hour   Intake --   Output 900 ml   Net -900 ml         Physical Exam  Vitals and nursing note reviewed.   Constitutional:       General: She is not in acute distress.  HENT:      Right Ear: External ear normal.      Left Ear: External ear normal.      Nose: Nose normal.      Mouth/Throat:      Pharynx: No oropharyngeal exudate.   Eyes:      Extraocular Movements: Extraocular movements intact.      Pupils: Pupils are equal, round, and reactive to light.   Cardiovascular:      Rate and Rhythm: Normal rate and regular rhythm.      Pulses: Normal pulses.      Heart sounds: No murmur heard.  Pulmonary:      Effort: No respiratory distress.   Chest:      Chest wall: No tenderness.   Abdominal:      General: Abdomen is flat. There is no distension.   Musculoskeletal:      Cervical back: No rigidity.   Neurological:      Mental Status: She is alert.             Significant Labs: All pertinent labs within the past 24 hours have been reviewed.    Significant Imaging: I have reviewed all pertinent imaging results/findings within the past 24 hours.

## 2024-07-29 VITALS
WEIGHT: 247.56 LBS | RESPIRATION RATE: 18 BRPM | BODY MASS INDEX: 39.79 KG/M2 | DIASTOLIC BLOOD PRESSURE: 92 MMHG | OXYGEN SATURATION: 97 % | SYSTOLIC BLOOD PRESSURE: 184 MMHG | TEMPERATURE: 98 F | HEART RATE: 84 BPM | HEIGHT: 66 IN

## 2024-07-29 PROCEDURE — 25000003 PHARM REV CODE 250: Performed by: HOSPITALIST

## 2024-07-29 PROCEDURE — 63600175 PHARM REV CODE 636 W HCPCS: Mod: UD | Performed by: HOSPITALIST

## 2024-07-29 PROCEDURE — 97530 THERAPEUTIC ACTIVITIES: CPT

## 2024-07-29 PROCEDURE — 97535 SELF CARE MNGMENT TRAINING: CPT

## 2024-07-29 RX ORDER — TRAMADOL HYDROCHLORIDE 50 MG/1
50 TABLET ORAL EVERY 12 HOURS PRN
Qty: 30 TABLET | Refills: 0 | Status: SHIPPED | OUTPATIENT
Start: 2024-07-29

## 2024-07-29 RX ORDER — PREGABALIN 100 MG/1
100 CAPSULE ORAL 3 TIMES DAILY
Qty: 90 CAPSULE | Refills: 0 | Status: SHIPPED | OUTPATIENT
Start: 2024-07-29

## 2024-07-29 RX ORDER — DIAZEPAM 2 MG/1
2 TABLET ORAL EVERY 6 HOURS PRN
Status: DISCONTINUED | OUTPATIENT
Start: 2024-07-29 | End: 2024-07-29 | Stop reason: HOSPADM

## 2024-07-29 RX ORDER — POLYETHYLENE GLYCOL 3350 17 G/17G
17 POWDER, FOR SOLUTION ORAL DAILY
Start: 2024-07-30

## 2024-07-29 RX ORDER — QUETIAPINE FUMARATE 100 MG/1
100 TABLET, FILM COATED ORAL DAILY
Qty: 90 TABLET | Refills: 3 | Status: SHIPPED | OUTPATIENT
Start: 2024-07-29

## 2024-07-29 RX ORDER — OXYCODONE HYDROCHLORIDE 5 MG/1
5 TABLET ORAL EVERY 4 HOURS PRN
Qty: 30 TABLET | Refills: 0 | Status: SHIPPED | OUTPATIENT
Start: 2024-07-29

## 2024-07-29 RX ORDER — ACETAMINOPHEN 500 MG
1000 TABLET ORAL EVERY 6 HOURS PRN
Start: 2024-07-29

## 2024-07-29 RX ORDER — DIAZEPAM 2 MG/1
2 TABLET ORAL EVERY 8 HOURS PRN
Qty: 14 TABLET | Refills: 0 | Status: SHIPPED | OUTPATIENT
Start: 2024-07-29 | End: 2024-08-28

## 2024-07-29 RX ORDER — METHOCARBAMOL 500 MG/1
500 TABLET, FILM COATED ORAL 4 TIMES DAILY PRN
Qty: 21 TABLET | Refills: 0 | Status: SHIPPED | OUTPATIENT
Start: 2024-07-29 | End: 2024-08-08

## 2024-07-29 RX ORDER — ALUMINUM HYDROXIDE, MAGNESIUM HYDROXIDE, AND SIMETHICONE 1200; 120; 1200 MG/30ML; MG/30ML; MG/30ML
30 SUSPENSION ORAL EVERY 6 HOURS PRN
Status: DISCONTINUED | OUTPATIENT
Start: 2024-07-29 | End: 2024-07-29 | Stop reason: HOSPADM

## 2024-07-29 RX ADMIN — LOSARTAN POTASSIUM 25 MG: 25 TABLET, FILM COATED ORAL at 09:07

## 2024-07-29 RX ADMIN — DIAZEPAM 2 MG: 2 TABLET ORAL at 12:07

## 2024-07-29 RX ADMIN — CITALOPRAM HYDROBROMIDE 20 MG: 20 TABLET ORAL at 09:07

## 2024-07-29 RX ADMIN — ACETAMINOPHEN 1000 MG: 500 TABLET ORAL at 06:07

## 2024-07-29 RX ADMIN — MICONAZOLE NITRATE: 20 OINTMENT TOPICAL at 09:07

## 2024-07-29 RX ADMIN — PREGABALIN 100 MG: 50 CAPSULE ORAL at 09:07

## 2024-07-29 RX ADMIN — MORPHINE SULFATE 2 MG: 2 INJECTION, SOLUTION INTRAMUSCULAR; INTRAVENOUS at 09:07

## 2024-07-29 RX ADMIN — PANTOPRAZOLE SODIUM 40 MG: 40 TABLET, DELAYED RELEASE ORAL at 09:07

## 2024-07-29 RX ADMIN — LIDOCAINE 5% 2 PATCH: 700 PATCH TOPICAL at 09:07

## 2024-07-29 NOTE — PLAN OF CARE
[facility]     Department of Hospital Medicine     [address] [place] [address]     [phone] after hours  [phone] fax                                        FACILITY TRANSFER ORDERS     Patient Name: Dinora Anderson  YOB: [birth year]    Admit to:  REHAB    Code: Full    Diagnoses:  Active Hospital Problems    Diagnosis  POA    *Central stenosis of spinal canal [M48.00]  Yes    Reduced mobility [Z74.09]  Yes    Thrombocytopenia [D69.6]  No     Monitor with daily cbc        Lumbar myelopathy [G95.9]  Yes    Nicotine dependence [F17.200]  Yes    Severe obesity (BMI >= 40) [E66.01]  Yes    Drug dependence [F19.20]  Yes     THC + on drug screen  [place] on cessation       Pain [R52]  Yes    Sensory loss [R20.0]  Yes    Fall [W19.XXXA]  Yes    Brain lesion [G93.9]  Yes    Lumbar radiculopathy, chronic [M54.16]  Yes    Essential hypertension [I10]  Yes    Schizophrenia [F20.9]  Yes    Anxiety disorder [F41.9]  Yes    Bipolar 1 disorder, depressed, moderate [F31.32]  Yes      Resolved Hospital Problems    Diagnosis Date Resolved POA    Tobacco abuse [Z72.0] 07/23/2024 Yes       Vital Signs: Routine.    Allergies:  Review of patient's allergies indicates:   Allergen Reactions    Codeine      Rash and itch    Pcn [penicillins] Swelling     swelling       Diet: regular diet     Acitivities: activity as tolerated    Nursing: per routine    Labs: BMP, CBC daily for 3 days    CONSULTS:       Physical Therapy to evaluate and treat     Occupational Therapy to evaluate and treat       MISCELLANEOUS CARE:                 Routine Skin for Bedridden Patients:  Apply moisture barrier cream to all    skin folds and wet areas in perineal area daily and after baths and                           all bowel movements.    Medications:        Medication List        START taking these medications      acetaminophen 500 MG tablet  Commonly known as:  TYLENOL  Take 2 tablets (1,000 mg total) by mouth every 6 (six) hours as needed for  mild Pain.     diazePAM 2 MG tablet  Commonly known as: VALIUM  Take 1 tablet (2 mg total) by mouth every 8 (eight) hours as needed for Anxiety.     methocarbamoL 500 MG Tab  Commonly known as: ROBAXIN  Take 1 tablet (500 mg total) by mouth 4 (four) times daily as needed (muscle spasms).     miconazole nitrate 2% 2 % Oint  Commonly known as: MICOTIN  Apply topically 2 (two) times daily to areas of redness under breasts     oxyCODONE 5 MG immediate release tablet  Commonly known as: ROXICODONE  Take 1 tablet (5 mg total) by mouth every 4 (four) hours as needed for Pain (severe pain).     polyethylene glycol 17 gram Pwpk  Commonly known as: GLYCOLAX  Take 17 g by mouth once daily.  Start taking on: July 30, 2024            CONTINUE taking these medications      citalopram 20 MG tablet  Commonly known as: CeleXA  Take 20 mg by mouth once daily.     losartan 25 MG tablet  Commonly known as: COZAAR  Take 1 tablet (25 mg total) by mouth once daily.     omeprazole 40 MG capsule  Commonly known as: PRILOSEC  Take 1 capsule (40 mg total) by mouth once daily.     pregabalin 100 MG capsule  Commonly known as: LYRICA  Take 1 capsule (100 mg total) by mouth 3 (three) times daily.     QUEtiapine 100 MG Tab  Commonly known as: SEROQUEL  Take 1 tablet (100 mg total) by mouth once daily.     traMADoL 50 mg tablet  Commonly known as: ULTRAM  Take 1 tablet (50 mg total) by mouth every 12 (twelve) hours as needed for Pain.                  _________________________________  Angelic Kimball MD  07/29/2024

## 2024-07-29 NOTE — PT/OT/SLP PROGRESS
"Occupational Therapy   Treatment    Name: Dinora Anderson  MRN: [id number]  Admitting Diagnosis:  Central stenosis of spinal canal       Recommendations:     Discharge Recommendations: High Intensity Therapy  Discharge Equipment Recommendations:  bedside commode, bath bench, grab bar, hip kit, walker, rolling  Barriers to discharge:  None    Assessment:     Dinora Anderson is a 58 y.o. female with a medical diagnosis of Central stenosis of spinal canal. Performance deficits affecting function are weakness, impaired endurance, impaired self care skills, impaired functional mobility, gait instability, impaired balance, decreased coordination, decreased upper extremity function, decreased lower extremity function, decreased safety awareness, pain, impaired coordination. Pt agreeable to therapy and tolerated well. Pt performed toileting and grooming ADLs as well as functional mobility.  Pt remains limited in ADLs, functional mobility, and functional transfers. Patient has demonstrated sufficient progression to warrant high intensity therapy evidenced by objectives noted below.    Rehab Prognosis:  Good; patient would benefit from acute skilled OT services to address these deficits and reach maximum level of function.       Plan:     Patient to be seen 4 x/week to address the above listed problems via self-care/home management, therapeutic exercises, therapeutic activities  Plan of Care Expires: 08/24/24  Plan of Care Reviewed with: patient    Subjective     Chief Complaint: none  Patient/Family Comments/goals: "I can't wait to get home to my dog.His name is Little Man."   Pain/Comfort:  Pain Rating 1: 0/10  Pain Rating Post-Intervention 1: 0/10    Objective:     Communicated with: RN prior to session.  Patient found HOB elevated with  (no active lines) upon OT entry to room.    General Precautions: Standard, fall    Orthopedic Precautions:N/A  Braces: N/A  Respiratory Status: Room air     Occupational Performance:     Bed " Mobility:    Patient completed Scooting/Bridging with supervision  Patient completed Supine to Sit with supervision     Functional Mobility/Transfers:  Patient completed Sit <> Stand Transfer from EOB and BSC placed over standard toilet with stand by assistance  with  rolling walker   Patient completed Toilet Transfer Step Transfer technique with stand by assistance with  rolling walker  Functional Mobility: Pt engaged in functional mobility throughout hospital room and bathroom ~ 15 ft x 2 w/ RW and SBA to maximize functional endurance and standing balance required for home/community mobility and occupational engagement.   Sat EOB for total of 10 min SPV assist     Activities of Daily Living:  Grooming: stand by assistance oral hygiene performed in stance at sink  Toileting: supervision sitting on toilet w/ urination and BM       AMPAC 6 Click ADL: 20    Treatment & Education:  -OT provided emotional support, active listening, and utilized therapeutic use of self 2/2 pt frustrated with functional/medical status  -Education on energy conservation and task modification to maximize safety and (I) during ADLs and mobility  -Education on importance of OOB activity to improve overall activity tolerance and promote recovery  -Pt educated to call for assistance and to transfer with hospital staff only  -Provided education regarding role of OT, POC, & discharge recommendations with pt verbalizing understanding.  Pt had no further questions & when asked whether there were any concerns pt reported none.      Patient left sitting edge of bed with all lines intact, call button in reach, and RN notified    GOALS:   Multidisciplinary Problems       Occupational Therapy Goals          Problem: Occupational Therapy    Goal Priority Disciplines Outcome Interventions   Occupational Therapy Goal     OT, PT/OT Progressing    Description: Goals to be met by: 8/24/24     Patient will increase functional independence with ADLs by  performing:    LE Dressing with Supervision with AD if needed.  Grooming while standing at sink with Stand-by Assistance.  Toileting from toilet with Contact Guard Assistance for hygiene and clothing management.   Supine to sit with Stand-by Assistance.  Toilet transfer to toilet with Contact Guard Assistance.    DME Justifications (see above for complete DME recommendations)    Bedside Commode- Patient has a mobility limitation that significantly impairs their ability to participate in one or more mobility related activities of daily living, including toileting. This deficit can be resolved by using a bedside commode. Patient demonstrates mobility limitations that will cause them to be confined to one room at home without bathroom access for up to 30 days. Using a bedside commode will greatly improve the patient's ability to participate in MRADLs.     Rolling Walker- Patient demonstrates a mobility limitation that significantly impairs their ability to participate in one or more mobility related activities of daily living. Patient's mobility limitation cannot be sufficiently resolved with the use of a cane, but can be sufficiently resolved with the use of a rolling walker.The use of a rolling walker will considerably improve their ability to participate in MRADLs. Patient will use the walker on a regular basis at home.                             Time Tracking:     OT Date of Treatment: 07/29/24  OT Start Time: 1258  OT Stop Time: 1325  OT Total Time (min): 27 min    Billable Minutes:Self Care/Home Management 27 min    OT/MOOK: OT     Number of MOOK visits since last OT visit: 0    7/29/2024

## 2024-07-29 NOTE — PLAN OF CARE
Problem: Physical Therapy  Goal: Physical Therapy Goal  Description: Goals to be met by: 24     Patient will increase functional independence with mobility by performin. Supine <> sit with [place]  2. Rolling to Left and Right with [place].  3. Sit <> stand transfer with Modified [place] with LRAD  4. Bed <> chair transfer with Modified [place] using LRAD  5. Gait  x 100 feet with Modified [place] using LRAD   6. Ascend/descend 5 stair with left Handrails Stand-by Assistance with or without LRAD   7. Sitting at edge of bed 10 minutes with [place]  8. Stand for 8 minutes with Modified [place] with LRAD  9. Lower extremity exercise program x30 reps per handout, with assistance as needed    Outcome: Progressing

## 2024-07-29 NOTE — PLAN OF CARE
[facility])  Discharge Reassessment    Primary Care Provider: Monroe Ly MD    Expected Discharge Date: 7/30/2024    Reassessment (most recent)       Discharge Reassessment - 07/29/24 1110          Discharge Reassessment    Assessment Type Discharge Planning Assessment (P)      Did the patient's condition or plan change since previous assessment? Yes (P)      Discharge Plan discussed with: Patient (P)      Communicated GLENNA with patient/caregiver Yes (P)      Discharge Plan A Rehab (P)      Discharge Plan B Skilled Nursing Facility (P)      DME Needed Upon Discharge  none (P)      Transition of Care Barriers None (P)      Why the patient remains in the hospital Insurance issues (P)                    Pt not ready for discharge due to: pending insurance auth   CM will remain available for patient/families on NPU.  Currently pt has d/c plans in progress at this time.    Discharge Plan A and Plan B have been determined by review of patient's clinical status, future medical and therapeutic needs, and coverage/benefits for post-acute care in coordination with multidisciplinary team members.

## 2024-07-29 NOTE — SUBJECTIVE & OBJECTIVE
Interval History:   7/29: Patient feels current pain regimen is effective.     Review of Systems   All other systems reviewed and are negative.    Objective:     Vital Signs (Most Recent):  Temp: 98 °F (36.7 °C) (07/29/24 1143)  Pulse: 84 (07/29/24 1143)  Resp: 18 (07/29/24 1143)  BP: (!) 184/92 (07/29/24 1143)  SpO2: 97 % (07/29/24 1143) Vital Signs (24h Range):  Temp:  [97.7 °F (36.5 °C)-98.4 °F (36.9 °C)] 98 °F (36.7 °C)  Pulse:  [64-84] 84  Resp:  [18-20] 18  SpO2:  [94 %-97 %] 97 %  BP: (120-199)/(57-99) 184/92     Weight: 112.3 kg (247 lb 9.2 oz)  Body mass index is 39.96 kg/m².  No intake or output data in the 24 hours ending 07/29/24 1319      Physical Exam  Vitals and nursing note reviewed.   Constitutional:       General: She is not in acute distress.  HENT:      Right Ear: External ear normal.      Left Ear: External ear normal.      Nose: Nose normal.      Mouth/Throat:      Pharynx: No oropharyngeal exudate.   Eyes:      Extraocular Movements: Extraocular movements intact.      Pupils: Pupils are equal, round, and reactive to light.   Cardiovascular:      Rate and Rhythm: Normal rate and regular rhythm.      Pulses: Normal pulses.      Heart sounds: No murmur heard.  Pulmonary:      Effort: No respiratory distress.   Chest:      Chest wall: No tenderness.   Abdominal:      General: Abdomen is flat. There is no distension.   Musculoskeletal:      Cervical back: No rigidity.   Neurological:      Mental Status: She is alert.             Significant Labs: All pertinent labs within the past 24 hours have been reviewed.    Significant Imaging: I have reviewed all pertinent imaging results/findings within the past 24 hours.

## 2024-07-29 NOTE — PT/OT/SLP PROGRESS
"Physical Therapy Treatment    Patient Name:  Dinora Anderson   MRN:  [id number]    Recommendations:     Discharge Recommendations: High Intensity Therapy  Discharge Equipment Recommendations: bedside commode, bath bench, grab bar, hip kit, walker, rolling  Barriers to discharge: None    Assessment:     Dinora Anderson is a 58 y.o. female admitted with a medical diagnosis of Central stenosis of spinal canal. Patient presented with high motivation to participate in treatment session, with good response to completed activities and provided education. Patient gait trained to/from bathroom, with observed gait deviations encompassing step characteristics & AD management. She also required cues for breathing technique within step sequencing. Of note, she demonstrated improved confidence with gait trials this date. Patient has demonstrated sufficient progression to warrant high intensity therapy evidenced by objectives noted below. Performance deficits impacting function include weakness, impaired endurance, impaired functional mobility, impaired balance, gait instability, decreased lower extremity function.    Rehab Prognosis: Good; patient would benefit from acute skilled PT services to address these deficits and reach maximum level of function.    Recent Surgery: * No surgery found *      Plan:     During this hospitalization, patient to be seen 4 x/week to address the identified rehab impairments via gait training, therapeutic activities, therapeutic exercises, neuromuscular re-education and progress toward the following goals:    Plan of Care Expires:  08/24/24    Subjective     Chief Complaint: None stated  Patient/Family Comments/goals: "I get to go to Rehab today! I am just so excited!"  Pain/Comfort:  Pain Rating 1: Not rated  Pain Rating Post-Intervention 1: Not rated      Objective:     Communicated with Nsg prior to session.  Patient found HOB elevated with  (no active lines) upon PT entry to room.     General " "Precautions: Standard, fall   Orthopedic Precautions:N/A   Braces: N/A   Body mass index is 39.96 kg/m².  Oxygen Device: Room Air  Vitals: BP (!) 184/92 (BP Location: Right arm, Patient Position: Lying)   Pulse 84   Temp 98 °F (36.7 °C) (Oral)   Resp 18   Ht 5' 6" (1.676 m)   Wt 112.3 kg (247 lb 9.2 oz)   SpO2 97%   BMI 39.96 kg/m²      Functional Mobility:  Bed Mobility:     Rolling Left: Supervision with HOB Elevated  EOB Scooting:   Anterior: Stand-By Assistance  Supine>Sit: x1, Stand-By Assistance with HOB Elevated  Sit>Supine: x1, Stand-By Assistance with HOB Elevated    Transfers:     Sit<>Stand:   x1, Contact Guard Assistance from Edge of Bed with Rolling Walker  x1, Stand-By Assistance from Edge of Bed with Rolling Walker  Toilet Transfer: x1, Contact Guard Assistance with Rolling Walker using step transfer technique  *VC/TC for UE placement, step sequencing, relaxation    Gait:  2x8ft, Contact Guard Assistance with Rolling Walker  Gait Assessment: decreased step length, decreased phi, decreased gait speed, decreased heel strike, decreased toe push-off, tendency to ambulate outside of AD JOHN, step-to pattern  Total Distance: 16ft  *VC/TC for upright posturing, step sequencing, AD management, relaxation    Balance:   Static Sitting: Supervision  Dynamic Sitting: Supervision    Static Standing: Stand-By Assistance - Contact Guard Assistance  Dynamic Standing: Contact Guard Assistance    AM-PAC 6 CLICK MOBILITY  Turning over in bed (including adjusting bedclothes, sheets and blankets)?: 3  Sitting down on and standing up from a chair with arms (e.g., wheelchair, bedside commode, etc.): 3  Moving from lying on back to sitting on the side of the bed?: 3  Moving to and from a bed to a chair (including a wheelchair)?: 3  Need to walk in hospital room?: 3  Climbing 3-5 steps with a railing?: 2  Basic Mobility Total Score: 17     Treatment & Education:  Patient Education Provided on:  The role of physical " therapy and how the patient can benefit from skilled services  The negative effects of prolonged bed rest/sedentary behavior, along with the importance of OOB activity & patient participation with PT  The importance of contacting RN, via call light, for mobility throughout the day  Pt white board updated with current therapists name and level of mobility assistance needed.     Patient Verbalized understanding of all topics touched on this date. All patient questions answered within the PT scope of practice    Patient left HOB elevated with all lines intact and call button in reach.    GOALS:   Multidisciplinary Problems       Physical Therapy Goals          Problem: Physical Therapy    Goal Priority Disciplines Outcome Goal Variances Interventions   Physical Therapy Goal     PT, PT/OT Progressing     Description: Goals to be met by: 24     Patient will increase functional independence with mobility by performin. Supine <> sit with [place]  2. Rolling to Left and Right with [place].  3. Sit <> stand transfer with Modified [place] with LRAD  4. Bed <> chair transfer with Modified [place] using LRAD  5. Gait  x 100 feet with Modified [place] using LRAD   6. Ascend/descend 5 stair with left Handrails Stand-by Assistance with or without LRAD   7. Sitting at edge of bed 10 minutes with [place]  8. Stand for 8 minutes with Modified [place] with LRAD  9. Lower extremity exercise program x30 reps per handout, with assistance as needed                         Time Tracking:     PT Received On: 24  PT Start Time: 1217     PT Stop Time: 1231  PT Total Time (min): 14 min     Billable Minutes: Therapeutic Activity 14    Treatment Type: Treatment  PT/PTA: PT     Number of PTA visits since last PT visit: 0     2024

## 2024-07-29 NOTE — PLAN OF CARE
[facility])  Discharge Final Note    Primary Care Provider: Monroe Ly MD    Expected Discharge Date: 7/29/2024    Final Discharge Note (most recent)       Final Note - 07/29/24 1332          Final Note    Assessment Type Final Discharge Note     Anticipated Discharge Disposition Rehab Facility     What phone number can be called within the next 1-3 days to see how you are doing after discharge? [phone] (P)      Hospital Resources/Appts/Education Provided Provided patient/caregiver with written discharge plan information;Provided education on problems/symptoms using teachback;Appointments scheduled and added to AVS (P)                      Important Message from Medicare      Patient is discharging to [facility]. Transport arranged via [facility]. Attending nurse given information to call report.

## 2024-07-29 NOTE — PROGRESS NOTES
[facility] ([facility])  [facility] Medicine  Progress Note    Patient Name: Dinora Anderosn  MRN: [id number]  Patient Class: IP- Inpatient   Admission Date: 7/22/2024  Length of Stay: 7 days  Attending Physician: Angelic Kimball MD  Primary Care Provider: Monroe Ly MD        Subjective:     Principal Problem:Central stenosis of spinal canal        HPI:  58-year-old white female admitted as a transfer from [facility] for possible cauda equina syndrome.  Patient reports being in her usual state of health sometime up until 2 days ago when she had a fall at home landing on her right side.  She thinks she fell by simply tripping on her right foot which has been dragging for some time but she is not completely sure.  She does not recall having any dizziness prior to the fall but may have been dizzy either during and/or after the fall.  Prior to the fall there was no nausea headaches or vision changes.  No chest pains or shortness a breath.  She reports being unable to have the strength to get up and she just laid on the floor until Sunday morning when her neighbor came in and called EMS.  She reports having an impaired gait since her surgery in November of last year.  She also reports having worsening weakness and upper and lower extremities in the last few weeks.  She does report having chronic tingling sensation in the right lower extremity.  Patient reports having ongoing urge incontinence for both urine and stool that has been present since the last surgery.  She affirms having a sensation of urge but is unable to hold relieving herself before getting to the restroom.  Reports having frequent accidents.    Denies any history of cancer but does affirm currently smoking.  Denies any history of stroke or CAD.    OSH CT of the lumbar spine demonstrates aforementioned hardware in satisfactory position. A CT of her head does demonstrate an area of hypoattenuation in the left frontal lobe,  likely representing vasogenic edema. CT c spine read as  C5-C6 severe spinal canal stenosis. Tried to undergo MRI  but was unable to do so due to claustrophobia.  Transferred here to due concerns for cauda equina and NSGY eval.     Overview/Hospital Course:  MRI head unremarkable for any actual brain lesion, felt that CT head was more of a artifactual lesion.  MRI L-spine showed Multilevel spondylosis, most advanced at L3-L4 with moderate spinal canal stenosis and moderate left neural foraminal narrowing for which Neurosurgery affirmed no acute intervention at this time.    Interval History:   7/29: Patient feels current pain regimen is effective.     Review of Systems   All other systems reviewed and are negative.    Objective:     Vital Signs (Most Recent):  Temp: 98 °F (36.7 °C) (07/29/24 1143)  Pulse: 84 (07/29/24 1143)  Resp: 18 (07/29/24 1143)  BP: (!) 184/92 (07/29/24 1143)  SpO2: 97 % (07/29/24 1143) Vital Signs (24h Range):  Temp:  [97.7 °F (36.5 °C)-98.4 °F (36.9 °C)] 98 °F (36.7 °C)  Pulse:  [64-84] 84  Resp:  [18-20] 18  SpO2:  [94 %-97 %] 97 %  BP: (120-199)/(57-99) 184/92     Weight: 112.3 kg (247 lb 9.2 oz)  Body mass index is 39.96 kg/m².  No intake or output data in the 24 hours ending 07/29/24 1319      Physical Exam  Vitals and nursing note reviewed.   Constitutional:       General: She is not in acute distress.  HENT:      Right Ear: External ear normal.      Left Ear: External ear normal.      Nose: Nose normal.      Mouth/Throat:      Pharynx: No oropharyngeal exudate.   Eyes:      Extraocular Movements: Extraocular movements intact.      Pupils: Pupils are equal, round, and reactive to light.   Cardiovascular:      Rate and Rhythm: Normal rate and regular rhythm.      Pulses: Normal pulses.      Heart sounds: No murmur heard.  Pulmonary:      Effort: No respiratory distress.   Chest:      Chest wall: No tenderness.   Abdominal:      General: Abdomen is flat. There is no distension.    Musculoskeletal:      Cervical back: No rigidity.   Neurological:      Mental Status: She is alert.             Significant Labs: All pertinent labs within the past 24 hours have been reviewed.    Significant Imaging: I have reviewed all pertinent imaging results/findings within the past 24 hours.    Assessment/Plan:      * Central stenosis of spinal canal  Transferred for evaluation of cauda equina syndrome   Noted on CT C-spine  History of L5-S1 right-sided fusion  MRI L-spine showed Multilevel spondylosis, most advanced at L3-L4 with moderate spinal canal stenosis and moderate left neural foraminal narrowing which Neurosurgery affirmed no acute intervention at this time  MRI C and T-spine pending         Brain lesion  Noted on CT head showing Possible hypodensity of the left frontal lobe may represent cerebral edema or infarct   Neuro checks   MRI brain unremarkable, suspect CT finding is artifact      Fall  Ground level fall  Unclear of mechanical vs neurogenic, treat underlying issues, see above  Pt/ot  Neurochecks        Lumbar radiculopathy, chronic  Lyrica      Essential hypertension  Losartan    Bipolar 1 disorder, depressed, moderate  Seroquel  lexapro        VTE Risk Mitigation (From admission, onward)           Ordered     IP VTE HIGH RISK PATIENT  Once         07/22/24 0651     Place sequential compression device  Until discontinued         07/22/24 0651                    Discharge Planning   GLENNA: 7/29/2024     Code Status: Full Code   Is the patient medically ready for discharge?:     Reason for patient still in hospital (select all that apply): Patient trending condition  Discharge Plan A: Rehab                  Angelic Kimball MD  Department of Hospital Medicine   [facility] - Neurosurgery ([facility])

## 2024-07-29 NOTE — PLAN OF CARE
Problem: Adult Inpatient Plan of Care  Goal: Plan of Care Review  Outcome: Progressing  Goal: Optimal Comfort and Wellbeing  Outcome: Progressing  Goal: Readiness for Transition of Care  Outcome: Progressing    Patient alert and oriented x 4; Ambulate to bathroom with walker and staff assistance; gait unsteady. C/O lower back pain; medicated with Robaxin and Morphine IV. Treatment appeared effective upon assessment. VS within limit. All safety measures reinforced. POC ongoing.

## 2024-07-29 NOTE — PLAN OF CARE
[facility] (Hospital)  Discharge Reassessment    Primary Care Provider: Monroe Ly MD    Expected Discharge Date: 7/30/2024    Reassessment (most recent)       Discharge Reassessment - 07/29/24 0909          Discharge Reassessment    Assessment Type Discharge Planning Reassessment (P)      Did the patient's condition or plan change since previous assessment? Yes (P)      Discharge Plan discussed with: Patient (P)      Communicated GLENNA with patient/caregiver Date not available/Unable to determine (P)      Discharge Plan A Rehab (P)      Discharge Plan B Home Health (P)      DME Needed Upon Discharge  none (P)      Transition of Care Barriers None (P)      Why the patient remains in the hospital Insurance issues (P)         Post-Acute Status    Post-Acute Authorization Placement (P)      Post-Acute Placement Status Pending payor review/awaiting authorization (if required) (P)                    Pt not ready for discharge due to: pending auth.   CM spoke with Brittney at [employer] [phone] and auth was submitted over the weekend.  CM will remain available for patient/families on NPU.  Currently pt has d/c plans in progress at this time.    Discharge Plan A and Plan B have been determined by review of patient's clinical status, future medical and therapeutic needs, and coverage/benefits for post-acute care in coordination with multidisciplinary team members.     1128 CM met with patient at bedside and updated on referral status, facility was awaiting approval from insurance company. Patient verbalized understanding and stated she is ready to discharge to rehab.   CM assured patient once auth is given she can discharge to rehab.    1208 Received call from Brittney with [employer] and auth has been obtain.   ASHLEY sent a message to Dr. Kimball to place rehab plan of care orders.    1213 CM sent orders over in [facility]. Brittney with [facility] to call CM back with number to call report.

## 2024-08-09 NOTE — DISCHARGE SUMMARY
[facility] Emergency Dept  Hospital Medicine  Discharge Summary      Patient Name: Dinora Anderson  MRN: [id number]  MAGUI: [id number]  Patient Class: IP- Inpatient  Admission Date: 7/21/2024  Hospital Length of Stay: 1 days  Discharge Date and Time: 7/22/2024  4:11 AM  Attending Physician: Rhianna att. providers found   Discharging Provider: Minoo Rodriguez MD  Primary Care Provider: Monroe Ly MD    Primary Care Team: Networked reference to record PCT     HPI:   Dinora Anderson is a 58 y.o. female with hypertension, cigarette smoking, bipolar disorder, schizophrenia, depression, physical debility due to back problems who presents to the emergency department complaining of a fall yesterday and being unable to get up, worsening lower extremity weakness, worsening bowel and bladder incontinence.  She says she was walking outside when her vision became blurry prompting her to fall.  She did not lose consciousness or hit her head.  She describes having a back surgery in November of last year because she could not walk.  She reports having issues with bladder incontinence at that time.  She says that now she is beginning to experience bowel incontinence as well.  She says she has not been able to walk well since that time.  She uses a rolling walker currently to ambulate.       CT head reveals possible hypodensity of the left frontal lobe possibly representing cerebral edema or infarct.  CT cervical spine reveals severe canal stenosis at C5-6.  Thoracic CT reveals prominent osteophytes T3-L1.  Lumbar CT significant for severe canal and foraminal stenosis L3-L4.    * No surgery found *      Hospital Course:   Discussed with [facility]. Holly who recommended to transfer for MRI spine and then make a determination for complete transfer to [facility] or return to [place] if MRI is negative for cauda equina     Goals of Care Treatment Preferences:  Code Status: Full Code         Consults:      No new Assessment & Plan notes have been filed under this hospital service since the last note was generated.  Service: Hospital Medicine    Final Active Diagnoses:    Diagnosis Date Noted POA    Lower extremity weakness [R29.898] 07/21/2024 Unknown    Incontinence of bowel [R15.9] 07/21/2024 Yes    Bladder incontinence [R32] 07/21/2024 Yes    Stroke-like symptom [R29.90] 07/21/2024 Yes    Essential hypertension [I10] 05/16/2023 Yes    Schizophrenia [F20.9] 05/16/2023 Yes    Anxiety disorder [F41.9] 12/06/2014 Yes    Bipolar 1 disorder, depressed, moderate [F31.32] 12/06/2014 Yes      Problems Resolved During this Admission:    Diagnosis Date Noted Date Resolved POA    Tobacco abuse [Z72.0] 12/06/2014 07/23/2024 Yes       Discharged Condition: stable    Disposition: Planned Readmission - Di*    Follow Up:    Patient Instructions:   No discharge procedures on file.    Significant Diagnostic Studies: N/A    Pending Diagnostic Studies:       None           Medications:  Reconciled Home Medications:      Medication List        ASK your doctor about these medications      citalopram 20 MG tablet  Commonly known as: CeleXA  Take 20 mg by mouth once daily.     losartan 25 MG tablet  Commonly known as: COZAAR  Take 1 tablet (25 mg total) by mouth once daily.     omeprazole 40 MG capsule  Commonly known as: PRILOSEC  Take 1 capsule (40 mg total) by mouth once daily.              Indwelling Lines/Drains at time of discharge:   Lines/Drains/Airways       None                   Time spent on the discharge of patient: 35 minutes         Minoo Rodriguez MD  Department of Hospital Medicine  [facility] Emergency Dept

## 2024-08-09 NOTE — HPI
Dinora Adnerson is a 58 y.o. female with hypertension, cigarette smoking, bipolar disorder, schizophrenia, depression, physical debility due to back problems who presents to the emergency department complaining of a fall yesterday and being unable to get up, worsening lower extremity weakness, worsening bowel and bladder incontinence.  She says she was walking outside when her vision became blurry prompting her to fall.  She did not lose consciousness or hit her head.  She describes having a back surgery in November of last year because she could not walk.  She reports having issues with bladder incontinence at that time.  She says that now she is beginning to experience bowel incontinence as well.  She says she has not been able to walk well since that time.  She uses a rolling walker currently to ambulate.       CT head reveals possible hypodensity of the left frontal lobe possibly representing cerebral edema or infarct.  CT cervical spine reveals severe canal stenosis at C5-6.  Thoracic CT reveals prominent osteophytes T3-L1.  Lumbar CT significant for severe canal and foraminal stenosis L3-L4.

## 2024-08-09 NOTE — HOSPITAL COURSE
Discussed with [facility] Dr. Holly who recommended to transfer for MRI spine and then make a determination for complete transfer to [facility] or return to [place] if MRI is negative for cauda equina

## 2024-08-14 NOTE — DISCHARGE SUMMARY
DISCHARGE SUMMARY  Hospital Medicine    Team: [facility] [place] [place] HOSP MED N    Patient Name: Dinora Anderson  YOB: 1965    Admit Date: 7/22/2024    Discharge Date: 7/29/2024    Discharge Attending Physician: Angelic Kimball      Admitting Resident    Diagnoses:  Active Hospital Problems    Diagnosis  POA    *Central stenosis of spinal canal [M48.00]  Yes    Reduced mobility [Z74.09]  Yes    Thrombocytopenia [D69.6]  No     Monitor with daily cbc        Lumbar myelopathy [G95.9]  Yes    Nicotine dependence [F17.200]  Yes    Severe obesity (BMI >= 40) [E66.01]  Yes    Drug dependence [F19.20]  Yes     THC + on drug screen  [place] on cessation       Pain [R52]  Yes    Sensory loss [R20.0]  Yes    Fall [W19.XXXA]  Yes    Brain lesion [G93.9]  Yes    Lumbar radiculopathy, chronic [M54.16]  Yes    Essential hypertension [I10]  Yes    Schizophrenia [F20.9]  Yes    Anxiety disorder [F41.9]  Yes    Bipolar 1 disorder, depressed, moderate [F31.32]  Yes      Resolved Hospital Problems    Diagnosis Date Resolved POA    Tobacco abuse [Z72.0] 07/23/2024 Yes       Discharged Condition: admit problems have stabilized       Patient seen and examined on date of discharge. 45 min spent on face to face time and medical decision making.     Physical Exam  Vitals and nursing note reviewed.   Constitutional:       General: She is not in acute distress.  HENT:      Right Ear: External ear normal.      Left Ear: External ear normal.      Nose: Nose normal.      Mouth/Throat:      Pharynx: No oropharyngeal exudate.   Eyes:      Extraocular Movements: Extraocular movements intact.      Pupils: Pupils are equal, round, and reactive to light.   Cardiovascular:      Rate and Rhythm: Normal rate and regular rhythm.      Pulses: Normal pulses.      Heart sounds: No murmur heard.  Pulmonary:      Effort: No respiratory distress.   Chest:      Chest wall: No tenderness.   Abdominal:      General: Abdomen is flat. There is no distension.    Musculoskeletal:      Cervical back: No rigidity.   Neurological:      Mental Status: She is alert.        HOSPITAL COURSE:      Initial Presentation:    58-year-old white female admitted as a transfer from [facility] for possible cauda equina syndrome.  Patient reports being in her usual state of health sometime up until 2 days ago when she had a fall at home landing on her right side.  She thinks she fell by simply tripping on her right foot which has been dragging for some time but she is not completely sure.  She does not recall having any dizziness prior to the fall but may have been dizzy either during and/or after the fall.  Prior to the fall there was no nausea headaches or vision changes.  No chest pains or shortness a breath.  She reports being unable to have the strength to get up and she just laid on the floor until Sunday morning when her neighbor came in and called EMS.  She reports having an impaired gait since her surgery in November of last year.  She also reports having worsening weakness and upper and lower extremities in the last few weeks.  She does report having chronic tingling sensation in the right lower extremity.  Patient reports having ongoing urge incontinence for both urine and stool that has been present since the last surgery.  She affirms having a sensation of urge but is unable to hold relieving herself before getting to the restroom.  Reports having frequent accidents.     Denies any history of cancer but does affirm currently smoking.  Denies any history of stroke or CAD.     OSH CT of the lumbar spine demonstrates aforementioned hardware in satisfactory position. A CT of her head does demonstrate an area of hypoattenuation in the left frontal lobe, likely representing vasogenic edema. CT c spine read as  C5-C6 severe spinal canal stenosis. Tried to undergo MRI  but was unable to do so due to claustrophobia.  Transferred here to due concerns for cauda equina and NSGY  hemanth.      Overview/Hospital Course:  MRI head unremarkable for any actual brain lesion, felt that CT head was more of a artifactual lesion.  MRI L-spine showed Multilevel spondylosis, most advanced at L3-L4 with moderate spinal canal stenosis and moderate left neural foraminal narrowing for which Neurosurgery affirmed no acute intervention at this time.       Course of Principle Problem for Admission:    Central stenosis of spinal canal  Transferred for evaluation of cauda equina syndrome   Noted on CT C-spine  History of L5-S1 right-sided fusion  MRI L-spine showed Multilevel spondylosis, most advanced at L3-L4 with moderate spinal canal stenosis and moderate left neural foraminal narrowing which Neurosurgery affirmed no acute intervention at this time    Other Medical Problems Addressed in the Hospital:    rain lesion  Noted on CT head showing Possible hypodensity of the left frontal lobe may represent cerebral edema or infarct   Neuro checks   MRI brain unremarkable, suspect CT finding is artifact       CONSULTS: NSGY    Disposition:  Home         Future Scheduled Appointments:  Future Appointments   Date Time Provider Department Center   9/19/2024  9:30 AM [facility] OIC-XRAY [facility]   9/19/2024 10:00 AM Helena Page, NP [facility] NEUROS8 [facility]       Follow-up Plans from This Hospitalization:  NSGY    Discharge Medication List:        Medication List        START taking these medications      acetaminophen 500 MG tablet  Commonly known as: TYLENOL  Take 2 tablets (1,000 mg total) by mouth every 6 (six) hours as needed for Pain.     diazePAM 2 MG tablet  Commonly known as: VALIUM  Take 1 tablet (2 mg total) by mouth every 8 (eight) hours as needed for Anxiety.     miconazole nitrate 2% 2 % Oint  Commonly known as: MICOTIN  Apply topically 2 (two) times daily.     oxyCODONE 5 MG immediate release tablet  Commonly known as: ROXICODONE  Take 1 tablet (5 mg total) by mouth every 4 (four) hours as  needed for Pain (severe pain).     polyethylene glycol 17 gram Pwpk  Commonly known as: GLYCOLAX  Take 17 g by mouth once daily.            CONTINUE taking these medications      citalopram 20 MG tablet  Commonly known as: CeleXA     losartan 25 MG tablet  Commonly known as: COZAAR  Take 1 tablet (25 mg total) by mouth once daily.     omeprazole 40 MG capsule  Commonly known as: PRILOSEC  Take 1 capsule (40 mg total) by mouth once daily.     pregabalin 100 MG capsule  Commonly known as: LYRICA  Take 1 capsule (100 mg total) by mouth 3 (three) times daily.     QUEtiapine 100 MG Tab  Commonly known as: SEROQUEL  Take 1 tablet (100 mg total) by mouth once daily.     traMADoL 50 mg tablet  Commonly known as: ULTRAM  Take 1 tablet (50 mg total) by mouth every 12 (twelve) hours as needed for Pain.            STOP taking these medications      diclofenac 75 MG EC tablet  Commonly known as: VOLTAREN            ASK your doctor about these medications      methocarbamoL 500 MG Tab  Commonly known as: ROBAXIN  Take 1 tablet (500 mg total) by mouth 4 (four) times daily as needed (muscle spasms).  Ask about: Should I take this medication?               Where to Get Your Medications        These medications were sent to [facility] [address] [address]      Hours: Always Open Phone: [phone]   QUEtiapine 100 MG Tab       You can get these medications from any pharmacy    Bring a paper prescription for each of these medications  diazePAM 2 MG tablet  methocarbamoL 500 MG Tab  oxyCODONE 5 MG immediate release tablet  pregabalin 100 MG capsule  traMADoL 50 mg tablet       Information about where to get these medications is not yet available    Ask your nurse or doctor about these medications  acetaminophen 500 MG tablet  miconazole nitrate 2% 2 % Oint  polyethylene glycol 17 gram Pwpk         Patient Instructions:  Discharge Procedure Orders   XR Cervical Spine AP Lateral w/ Flex Ext and  Swimmers Flex Ext with Odontoid   Standing Status: Future Standing Exp. Date: 07/25/25     Order Specific Question Answer Comments   May the Radiologist modify the order per protocol to meet the clinical needs of the patient? Yes    Release to patient Immediate      Ambulatory referral/consult to Pain Clinic   Standing Status: Future   Referral Priority: Routine Referral Type: Consultation   Referral Reason: Specialty Services Required   Requested Specialty: Pain Medicine   Number of Visits Requested: 1     Ambulatory referral/consult to Sleep Disorders   Standing Status: Future   Referral Priority: Routine Referral Type: Consultation   Requested Specialty: Sleep Medicine   Number of Visits Requested: 1       Signing Physician:  Angelic Kimball MD

## 2024-08-16 NOTE — DISCHARGE SUMMARY
Hospitalist Discharge Note    Attending Physician  EMILY CALDERON DO      Admit Date  7/22/2024    Discharge Date  07/22/2024    Discharge Diagnosis  Patient Active Problem List   Diagnosis    Bronchitis    Anxiety disorder    Bipolar 1 disorder, depressed, moderate    Gallbladder attack    Essential hypertension    Lumbar radiculopathy, chronic    Schizophrenia    Lower extremity weakness    Incontinence of bowel    Bladder incontinence    Stroke-like symptom    Nicotine dependence    Severe obesity (BMI >= 40)    Drug dependence    Pain    Sensory loss    Fall    Central stenosis of spinal canal    Brain lesion    Lumbar myelopathy    Thrombocytopenia    Reduced mobility         Consultants  N/A    Pertinent Diagnostic Studies and Procedures    Labs     CBC:      Recent Labs   Lab 07/21/24  1216   WBC 6.82   HGB 15.8   HCT 47.5         CMP:      Recent Labs   Lab 07/21/24  1216   CALCIUM 9.2   ALBUMIN 3.6   PROT 7.9      K 3.9   CO2 19*      BUN 11   CREATININE 0.7   ALKPHOS 153*   ALT 59*   AST 50*   BILITOT 0.7            Imaging & Other Studies     Imaging Results                  CT Cervical Spine Without Contrast (Final result)  Result time 07/21/24 17:41:49            Final result by Saray Carmona MD (07/21/24 17:41:49)                           Impression:        As above.        Electronically signed by:Saray Carmona  Date:                                        07/21/2024  Time:                                                17:41                     Narrative:     EXAMINATION:  CT CERVICAL SPINE WITHOUT CONTRAST     CLINICAL HISTORY:  Neck pain, acute, no red flags;     TECHNIQUE:  Low dose axial images, sagittal and coronal reformations were performed though the cervical spine.  Contrast was not administered.     COMPARISON:  None     FINDINGS:  Osteopenia.  No acute fracture or listhesis.  Moderate mid to lower cervical spondylosis.  Severe central canal stenosis at  C5-6.  Unremarkable prevertebral soft tissues.                                                CT Thoracic Spine Without Contrast (Final result)  Result time 07/21/24 14:10:51            Final result by Addison Brewster Jr., MD (07/21/24 14:10:51)                           Impression:        Prominent spurs and osteophytes anteriorly and to the right from T3-L1.  Otherwise negative thoracic spine CT.        Electronically signed by:Addison Brewster MD  Date:                                        07/21/2024  Time:                                                14:10                     Narrative:     EXAMINATION:  CT THORACIC SPINE WITHOUT CONTRAST     CLINICAL HISTORY:  Mid-back pain;     TECHNIQUE:  Axial images are obtained through the thoracic spine and displayed at soft tissue and bone windows.  Sagittal and coronal reconstructions are provided.     COMPARISON:  None     FINDINGS:  The alignment is normal.  The vertebral bodies are intact without evidence of fracture or compression.  There are prominent anterior spurs and osteophytes at every level from T3 downward to L1, primarily anteriorly and to the right.  The intervertebral discs are within normal limits.  Posterior spur formation is not seen.  Spinal canal or neural foraminal stenosis is not demonstrated on this study.     Fracture of the adjacent ribs in the back are not seen.                                                CT Lumbar Spine Without Contrast (Final result)  Result time 07/21/24 14:23:07            Final result by Addison Brewster Jr., MD (07/21/24 14:23:07)                           Impression:        Chronic degenerative disc disease and spondylosis at every level of the lower thoracic and lumbar spine.  Prior interbody fusion and posterior fixation at L5-S1.  Spinal canal or neural foraminal stenosis as described.  An acute fracture is not seen.        Electronically signed by:Addison Brewster MD  Date:                                         07/21/2024  Time:                                                14:23                     Narrative:     EXAMINATION:  CT LUMBAR SPINE WITHOUT CONTRAST     CLINICAL HISTORY:  Low back pain, increased fracture risk;     TECHNIQUE:  Low-dose axial, sagittal and coronal reformations are obtained through the lumbar spine.  Contrast was not administered.     COMPARISON:  CT of the lumbar spine of February 22, 2017.     FINDINGS:  The alignment is normal.  The patient has undergone interbody fusion with a high metal device at the disc at L5-S1.  Posterior fixation with bilateral pedicle screws has been performed at L5 and S1.  There is partial laminectomy noted of S1.  There is disc space narrowing at every other level of the lumbar spine with spondylosis.  Prominent anterior osteophytes are noted at T12-L1 and L1-2.     At L1-2 there is disc protrusion and spur complex producing mild spinal canal and left lateral recess stenosis.     At L2-3 there is left neural foraminal stenosis.     At L3-4 there short pedicles hypertrophy and circumferential disc protrusion with spur formation producing moderate to severe spinal canal and bilateral neural foraminal stenosis.     At L4-5 there is significant metal artifact obscuring the view but there is suspected bilateral neural foraminal stenosis.     Residual mild stenosis is noted at L5-S1.                                                CT Head Without Contrast (Final result)  Result time 07/21/24 14:06:53            Final result by Addison Brewster Jr., MD (07/21/24 14:06:53)                           Impression:        Possible hypodensity of the left frontal lobe may represent cerebral edema or infarct.  MRI of the brain is recommended.  No intracranial hemorrhage is noted.        Electronically signed by:Addison Brewster MD  Date:                                        07/21/2024  Time:                                                14:06                      Narrative:     EXAMINATION:  CT HEAD WITHOUT CONTRAST     CLINICAL HISTORY:  Head trauma, intracranial arterial injury suspected;     TECHNIQUE:  Low dose axial images were obtained through the head.  Coronal and sagittal reformations were also performed. Contrast was not administered.     COMPARISON:  None.     FINDINGS:  No cranial fracture is identified.  Scalp edema or hematoma is not demonstrated.     The basal cisterns are clear and symmetric.  There is no mass effect or midline shift.  The ventricles are of normal size and symmetric.  The gray-white matter differentiation is normal.  There appears to be subtle hypodensity of the left frontal lobe and an acute infarct or cerebral edema is not ruled out.  No hemorrhage is seen.  The rest of the brain parenchyma appears of normal density without hyper or hypodense lesions consistent with tumor or hemorrhage seen.  No intracranial hemorrhage or hematoma is noted.                           Hospital Course    Per admission HPI:   Dinora Anderson is a 58 y.o. female with hypertension, cigarette smoking, bipolar disorder, schizophrenia, depression, physical debility due to back problems who presents to the emergency department complaining of a fall yesterday and being unable to get up, worsening lower extremity weakness, worsening bowel and bladder incontinence.  She says she was walking outside when her vision became blurry prompting her to fall.  She did not lose consciousness or hit her head.  She describes having a back surgery in November of last year because she could not walk.  She reports having issues with bladder incontinence at that time.  She says that now she is beginning to experience bowel incontinence as well.  She says she has not been able to walk well since that time.  She uses a rolling walker currently to ambulate.       CT head reveals possible hypodensity of the left frontal lobe possibly representing cerebral edema or infarct.  CT cervical  spine reveals severe canal stenosis at C5-6.  Thoracic CT reveals prominent osteophytes T3-L1.  Lumbar CT significant for severe canal and foraminal stenosis L3-L4.      Ms. Anderson was admitted to the medicine service where she underwent evaluation and management of multiple level severe spinal stenosis and possible left frontal lobe lesion.  Upon further evaluation in the emergency department there was significant concern for cauda equina syndrome.  She had low rectal tone and partial saddle anesthesia as well as history consistent with this.  We have arranged transfer to tertiary facility for further evaluation.  Patient expressed understanding of and agreement with this plan.  She was transferred later in the night while I was off service.    Discharge Condition:  Stable      Discharge Disposition:  Discharged to tertiary facility    Discharge Medications:     Medication List        START taking these medications      acetaminophen 500 MG tablet  Commonly known as: TYLENOL  Take 2 tablets (1,000 mg total) by mouth every 6 (six) hours as needed for Pain.     diazePAM 2 MG tablet  Commonly known as: VALIUM  Take 1 tablet (2 mg total) by mouth every 8 (eight) hours as needed for Anxiety.     miconazole nitrate 2% 2 % Oint  Commonly known as: MICOTIN  Apply topically 2 (two) times daily.     oxyCODONE 5 MG immediate release tablet  Commonly known as: ROXICODONE  Take 1 tablet (5 mg total) by mouth every 4 (four) hours as needed for Pain (severe pain).     polyethylene glycol 17 gram Pwpk  Commonly known as: GLYCOLAX  Take 17 g by mouth once daily.            CONTINUE taking these medications      citalopram 20 MG tablet  Commonly known as: CeleXA     losartan 25 MG tablet  Commonly known as: COZAAR  Take 1 tablet (25 mg total) by mouth once daily.     omeprazole 40 MG capsule  Commonly known as: PRILOSEC  Take 1 capsule (40 mg total) by mouth once daily.     pregabalin 100 MG capsule  Commonly known as: LYRICA  Take  1 capsule (100 mg total) by mouth 3 (three) times daily.     QUEtiapine 100 MG Tab  Commonly known as: SEROQUEL  Take 1 tablet (100 mg total) by mouth once daily.     traMADoL 50 mg tablet  Commonly known as: ULTRAM  Take 1 tablet (50 mg total) by mouth every 12 (twelve) hours as needed for Pain.            STOP taking these medications      diclofenac 75 MG EC tablet  Commonly known as: VOLTAREN            ASK your doctor about these medications      methocarbamoL 500 MG Tab  Commonly known as: ROBAXIN  Take 1 tablet (500 mg total) by mouth 4 (four) times daily as needed (muscle spasms).  Ask about: Should I take this medication?               Where to Get Your Medications        These medications were sent to [facility]  [address] [facility] [address]      Hours: Always Open Phone: [phone]   QUEtiapine 100 MG Tab       You can get these medications from any pharmacy    Bring a paper prescription for each of these medications  diazePAM 2 MG tablet  methocarbamoL 500 MG Tab  oxyCODONE 5 MG immediate release tablet  pregabalin 100 MG capsule  traMADoL 50 mg tablet       Information about where to get these medications is not yet available    Ask your nurse or doctor about these medications  acetaminophen 500 MG tablet  miconazole nitrate 2% 2 % Oint  polyethylene glycol 17 gram Pwpk             Discharge Instructions  To be determined at tertiary facility        Follow Up  To be determined at tertiary facility      Time Spent on Discharge  Patient was discharged overnight while I was off service.

## 2024-09-10 ENCOUNTER — TELEPHONE (OUTPATIENT)
Dept: NEUROSURGERY | Facility: CLINIC | Age: 59
End: 2024-09-10
Payer: MEDICAID

## 2024-09-10 NOTE — TELEPHONE ENCOUNTER
Called patient to reschedule an upcoming appointment with [facility]  and provided next appointment date and time.  Future Appointments   Date Time Provider Department Center   9/26/2024 12:45 PM [facility] OIC-XRAY [facility]   9/26/2024  2:00 PM Helena Page, NP [facility] NEUROS8 [place]        Patient voiced understanding and thanked me.

## 2024-09-18 ENCOUNTER — HOSPITAL ENCOUNTER (EMERGENCY)
Facility: HOSPITAL | Age: 59
Discharge: HOME HEALTH | End: 2024-09-18
Attending: EMERGENCY MEDICINE
Payer: MEDICAID

## 2024-09-18 VITALS
DIASTOLIC BLOOD PRESSURE: 77 MMHG | BODY MASS INDEX: 39.7 KG/M2 | WEIGHT: 247 LBS | HEIGHT: 66 IN | HEART RATE: 81 BPM | OXYGEN SATURATION: 95 % | SYSTOLIC BLOOD PRESSURE: 125 MMHG | RESPIRATION RATE: 20 BRPM | TEMPERATURE: 99 F

## 2024-09-18 DIAGNOSIS — R42 DIZZINESS: Primary | ICD-10-CM

## 2024-09-18 LAB
ALBUMIN SERPL BCP-MCNC: 3.7 G/DL (ref 3.5–5.2)
ALP SERPL-CCNC: 138 U/L (ref 55–135)
ALT SERPL W/O P-5'-P-CCNC: 46 U/L (ref 10–44)
AMPHET+METHAMPHET UR QL: ABNORMAL
ANION GAP SERPL CALC-SCNC: 13 MMOL/L (ref 8–16)
AST SERPL-CCNC: 39 U/L (ref 10–40)
BACTERIA #/AREA URNS HPF: ABNORMAL /HPF
BARBITURATES UR QL SCN>200 NG/ML: NEGATIVE
BASOPHILS # BLD AUTO: 0.04 K/UL (ref 0–0.2)
BASOPHILS NFR BLD: 0.4 % (ref 0–1.9)
BENZODIAZ UR QL SCN>200 NG/ML: ABNORMAL
BILIRUB SERPL-MCNC: 0.7 MG/DL (ref 0.1–1)
BILIRUB UR QL STRIP: NEGATIVE
BUN SERPL-MCNC: 15 MG/DL (ref 6–20)
BZE UR QL SCN: NEGATIVE
CALCIUM SERPL-MCNC: 9.5 MG/DL (ref 8.7–10.5)
CANNABINOIDS UR QL SCN: ABNORMAL
CHLORIDE SERPL-SCNC: 107 MMOL/L (ref 95–110)
CLARITY UR: CLEAR
CO2 SERPL-SCNC: 23 MMOL/L (ref 23–29)
COLOR UR: ABNORMAL
CREAT SERPL-MCNC: 0.8 MG/DL (ref 0.5–1.4)
CREAT UR-MCNC: 201.5 MG/DL (ref 15–325)
DIFFERENTIAL METHOD BLD: NORMAL
EOSINOPHIL # BLD AUTO: 0 K/UL (ref 0–0.5)
EOSINOPHIL NFR BLD: 0.3 % (ref 0–8)
ERYTHROCYTE [DISTWIDTH] IN BLOOD BY AUTOMATED COUNT: 14.2 % (ref 11.5–14.5)
EST. GFR  (NO RACE VARIABLE): >60 ML/MIN/1.73 M^2
GLUCOSE SERPL-MCNC: 102 MG/DL (ref 70–110)
GLUCOSE UR QL STRIP: ABNORMAL
HCT VFR BLD AUTO: 45.9 % (ref 37–48.5)
HCV AB SERPL QL IA: REACTIVE
HGB BLD-MCNC: 15.6 G/DL (ref 12–16)
HGB UR QL STRIP: NEGATIVE
HIV 1+2 AB+HIV1 P24 AG SERPL QL IA: NORMAL
HYALINE CASTS #/AREA URNS LPF: 0 /LPF
IMM GRANULOCYTES # BLD AUTO: 0.04 K/UL (ref 0–0.04)
IMM GRANULOCYTES NFR BLD AUTO: 0.4 % (ref 0–0.5)
KETONES UR QL STRIP: NEGATIVE
LEUKOCYTE ESTERASE UR QL STRIP: NEGATIVE
LYMPHOCYTES # BLD AUTO: 2.1 K/UL (ref 1–4.8)
LYMPHOCYTES NFR BLD: 21.6 % (ref 18–48)
MCH RBC QN AUTO: 30.8 PG (ref 27–31)
MCHC RBC AUTO-ENTMCNC: 34 G/DL (ref 32–36)
MCV RBC AUTO: 91 FL (ref 82–98)
METHADONE UR QL SCN>300 NG/ML: NEGATIVE
MICROSCOPIC COMMENT: ABNORMAL
MONOCYTES # BLD AUTO: 0.6 K/UL (ref 0.3–1)
MONOCYTES NFR BLD: 5.8 % (ref 4–15)
NEUTROPHILS # BLD AUTO: 6.9 K/UL (ref 1.8–7.7)
NEUTROPHILS NFR BLD: 71.5 % (ref 38–73)
NITRITE UR QL STRIP: NEGATIVE
NRBC BLD-RTO: 0 /100 WBC
OPIATES UR QL SCN: NEGATIVE
OTHER ELEMENTS URNS MICRO: ABNORMAL
PCP UR QL SCN>25 NG/ML: NEGATIVE
PH UR STRIP: 6 [PH] (ref 5–8)
PLATELET # BLD AUTO: 201 K/UL (ref 150–450)
PMV BLD AUTO: 10.1 FL (ref 9.2–12.9)
POTASSIUM SERPL-SCNC: 3.7 MMOL/L (ref 3.5–5.1)
PROT SERPL-MCNC: 7.9 G/DL (ref 6–8.4)
PROT UR QL STRIP: ABNORMAL
RBC # BLD AUTO: 5.06 M/UL (ref 4–5.4)
RBC #/AREA URNS HPF: 1 /HPF (ref 0–4)
SODIUM SERPL-SCNC: 143 MMOL/L (ref 136–145)
SP GR UR STRIP: >=1.03 (ref 1–1.03)
SQUAMOUS #/AREA URNS HPF: 5 /HPF
TOXICOLOGY INFORMATION: ABNORMAL
URN SPEC COLLECT METH UR: ABNORMAL
UROBILINOGEN UR STRIP-ACNC: 1 EU/DL
WBC # BLD AUTO: 9.61 K/UL (ref 3.9–12.7)
WBC #/AREA URNS HPF: 3 /HPF (ref 0–5)
YEAST URNS QL MICRO: ABNORMAL

## 2024-09-18 PROCEDURE — 85025 COMPLETE CBC W/AUTO DIFF WBC: CPT | Performed by: EMERGENCY MEDICINE

## 2024-09-18 PROCEDURE — 80053 COMPREHEN METABOLIC PANEL: CPT | Performed by: EMERGENCY MEDICINE

## 2024-09-18 PROCEDURE — 86803 HEPATITIS C AB TEST: CPT | Performed by: EMERGENCY MEDICINE

## 2024-09-18 PROCEDURE — 80307 DRUG TEST PRSMV CHEM ANLYZR: CPT | Performed by: EMERGENCY MEDICINE

## 2024-09-18 PROCEDURE — 70450 CT HEAD/BRAIN W/O DYE: CPT | Mod: TC

## 2024-09-18 PROCEDURE — 70450 CT HEAD/BRAIN W/O DYE: CPT | Mod: 26,,, | Performed by: RADIOLOGY

## 2024-09-18 PROCEDURE — 87389 HIV-1 AG W/HIV-1&-2 AB AG IA: CPT | Performed by: EMERGENCY MEDICINE

## 2024-09-18 PROCEDURE — 99284 EMERGENCY DEPT VISIT MOD MDM: CPT | Mod: 25

## 2024-09-18 PROCEDURE — 81000 URINALYSIS NONAUTO W/SCOPE: CPT | Mod: 59 | Performed by: EMERGENCY MEDICINE

## 2024-09-18 RX ORDER — LOSARTAN POTASSIUM 100 MG/1
100 TABLET ORAL EVERY MORNING
COMMUNITY
Start: 2024-08-15

## 2024-09-18 RX ORDER — HYDRALAZINE HYDROCHLORIDE 10 MG/1
10 TABLET, FILM COATED ORAL 4 TIMES DAILY PRN
COMMUNITY
Start: 2024-09-06

## 2024-09-18 RX ORDER — AMLODIPINE BESYLATE 5 MG/1
5 TABLET ORAL EVERY MORNING
COMMUNITY
Start: 2024-09-12

## 2024-09-18 NOTE — ED PROVIDER NOTES
Encounter Date: 9/18/2024       History     Chief Complaint   Patient presents with    Dizziness     Pt arrives via [facility] c/o dizziness x 2-3 days. Sent by home health RN for elevated BP.     58-year-old female here from home via EMS for evaluation and treatment of 2-3 days of dizziness.  Evidently she was sent by home health nurse secondary to elevated blood pressure at home today.  Blood pressure here 137/80.  Patient was a poor historian and seems confused.  She tells me that she has had a stroke a stroke.  Chart reflects possible stroke in July of this year.  Also suggests chronic back problems and it appears she was transferred out of here for symptoms of cauda equina.  She denies any new back pain, but states she can not walk.  She is unsure whether it was because of her stroke, or because of the cauda equina..  Patient denies any dysuria or hematuria.  Past medical history of bipolar 1 disorder and schizophrenia, depression, polysubstance abuse, PTSD, schizoaffective disorder.        Review of patient's allergies indicates:   Allergen Reactions    Codeine      Rash and itch    Pcn [penicillins] Swelling     swelling     Past Medical History:   Diagnosis Date    Bipolar 1 disorder     Bipolar 1 disorder     bipolar c schizophrenia and ptsd    Depression     Hypertension     Polysubstance abuse     PTSD (post-traumatic stress disorder)     Schizoaffective disorder     Schizophrenia      Past Surgical History:   Procedure Laterality Date    TUBAL LIGATION       No family history on file.  Social History     Tobacco Use    Smoking status: Former    Smokeless tobacco: Former     Quit date: 8/16/2013   Substance Use Topics    Alcohol use: No    Drug use: No     Review of Systems   Constitutional: Negative.    HENT: Negative.     Eyes: Negative.    Respiratory: Negative.     Cardiovascular: Negative.    Gastrointestinal: Negative.    Endocrine: Negative.    Genitourinary: Negative.    Musculoskeletal:   Positive for back pain (Chronic).   Skin: Negative.    Neurological:  Positive for dizziness and weakness (States she has chronic right-sided weakness, both right upper and lower extremities).   Psychiatric/Behavioral: Negative.         Physical Exam     Initial Vitals [09/18/24 1545]   BP Pulse Resp Temp SpO2   137/80 75 20 98.5 °F (36.9 °C) (!) 94 %      MAP       --         Physical Exam    Nursing note and vitals reviewed.  Constitutional: She appears well-developed and well-nourished. She is not diaphoretic. No distress.   HENT:   Head: Normocephalic and atraumatic.   Nose: Nose normal.   Mouth/Throat: Oropharynx is clear and moist. No oropharyngeal exudate.   Eyes: Conjunctivae and EOM are normal. Pupils are equal, round, and reactive to light. No scleral icterus.   Neck: Neck supple. No JVD present.   Normal range of motion.  Cardiovascular:  Normal rate, regular rhythm, normal heart sounds and intact distal pulses.           No murmur heard.  Pulmonary/Chest: Breath sounds normal. No stridor. No respiratory distress. She has no wheezes. She has no rhonchi. She has no rales.   Abdominal: Abdomen is soft. Bowel sounds are normal. She exhibits no distension. There is no abdominal tenderness.   Musculoskeletal:         General: No tenderness or edema. Normal range of motion.      Cervical back: Normal range of motion and neck supple.     Neurological: She is alert. She has normal strength. No cranial nerve deficit or sensory deficit. GCS score is 15. GCS eye subscore is 4. GCS verbal subscore is 5. GCS motor subscore is 6.   Patient was fully alert.  Seems confused and asked me which hospital she was in.  Moves all extremities, but  strength in the right hand is slightly weaker than left.  States she has chronic right leg weakness and chronic low back pain.  Denies any new pain.  There is no facial droop, speech is clear.   Skin: Skin is warm and dry. Capillary refill takes less than 2 seconds. No rash  noted. No erythema.   Psychiatric: She has a normal mood and affect. Her behavior is normal.         ED Course   Procedures  Labs Reviewed   COMPREHENSIVE METABOLIC PANEL - Abnormal       Result Value    Sodium 143      Potassium 3.7      Chloride 107      CO2 23      Glucose 102      BUN 15      Creatinine 0.8      Calcium 9.5      Total Protein 7.9      Albumin 3.7      Total Bilirubin 0.7      Alkaline Phosphatase 138 (*)     AST 39      ALT 46 (*)     eGFR >60.0      Anion Gap 13      Narrative:     Release to patient->Immediate   URINALYSIS, REFLEX TO URINE CULTURE - Abnormal    Specimen UA Urine, Unspecified      Color, UA Orange (*)     Appearance, UA Clear      pH, UA 6.0      Specific Gravity, UA >=1.030 (*)     Protein, UA 1+ (*)     Glucose, UA Trace (*)     Ketones, UA Negative      Bilirubin (UA) Negative      Occult Blood UA Negative      Nitrite, UA Negative      Urobilinogen, UA 1.0      Leukocytes, UA Negative      Narrative:     Preferred Collection Type->Urine, Clean Catch  Specimen Source->Urine   DRUG SCREEN PANEL, URINE EMERGENCY - Abnormal    Benzodiazepines Presumptive Positive (*)     Methadone metabolites Negative      Cocaine (Metab.) Negative      Opiate Scrn, Ur Negative      Barbiturate Screen, Ur Negative      Amphetamine Screen, Ur Presumptive Positive (*)     THC Presumptive Positive (*)     Phencyclidine Negative      Creatinine, Urine 201.5      Toxicology Information SEE COMMENT      Narrative:     Preferred Collection Type->Urine, Clean Catch  Specimen Source->Urine   URINALYSIS MICROSCOPIC - Abnormal    RBC, UA 1      WBC, UA 3      Bacteria Rare      Yeast, UA None      Squam Epithel, UA 5      Hyaline Casts, UA 0      Other (U/A) Moderate (*)     Microscopic Comment moderate mucus present      Narrative:     Preferred Collection Type->Urine, Clean Catch  Specimen Source->Urine   CBC W/ AUTO DIFFERENTIAL    WBC 9.61      RBC 5.06      Hemoglobin 15.6      Hematocrit 45.9       MCV 91      MCH 30.8      MCHC 34.0      RDW 14.2      Platelets 201      MPV 10.1      Immature Granulocytes 0.4      Gran # (ANC) 6.9      Immature Grans (Abs) 0.04      Lymph # 2.1      Mono # 0.6      Eos # 0.0      Baso # 0.04      nRBC 0      Gran % 71.5      Lymph % 21.6      Mono % 5.8      Eosinophil % 0.3      Basophil % 0.4      Differential Method Automated      Narrative:     Release to patient->Immediate   HIV 1 / 2 ANTIBODY   HEPATITIS C ANTIBODY          Imaging Results              CT Head Without Contrast (Final result)  Result time 09/18/24 19:07:29      Final result by George Gilliam MD (09/18/24 19:07:29)                   Impression:      No CT evidence of acute intracranial abnormality. Clinical correlation and further evaluation as warranted.    Chronic senescent and microvascular ischemic changes.      Electronically signed by: George Gilliam MD  Date:    09/18/2024  Time:    19:07               Narrative:    EXAMINATION:  CT HEAD WITHOUT CONTRAST    CLINICAL HISTORY:  Dizziness, persistent/recurrent, cardiac or vascular cause suspected;Mental status change, unknown cause;    TECHNIQUE:  Low dose axial images were obtained through the head.  Coronal and sagittal reformations were also performed. Contrast was not administered.    COMPARISON:  CT head without contrast 07/21/2024, MRI brain 07/23/2024    FINDINGS:  There is mild generalized cerebral volume loss.  There is mild periventricular white matter hypoattenuation suggesting sequelae of chronic microvascular ischemic change.  No CT evidence of acute intracranial hemorrhage, midline shift, or hydrocephalus.  The basal cisterns are patent. The mastoid air cells and paranasal sinuses are clear of acute process. No acute displaced calvarial fracture identified.                                       Medications - No data to display  Medical Decision Making  Differential is broad.  Unsure if the patient's confusion is new, or if it is  secondary to her stroke.  Or possibly a new stroke.  If this is the case, she was out of the window for any intervention.  There is a possibility that the patient is acting confused for some ulterior motive.  She was sent here for elevated blood pressure, but blood pressure here is near normal.  Will obtain blood and urine for labs,, and CT brain is ordered.  At shift change, patient's care is transferred to the oncoming emergency physician who will await results and make disposition.    CT and lab work reveal no acute process.  Drug screen is positive for benzodiazepines/marijuana and amphetamines.  We will discharge patient home to have her follow up with the primary care provider.    Amount and/or Complexity of Data Reviewed  Labs: ordered.  Radiology: ordered.                                      Clinical Impression:  Final diagnoses:  [R42] Dizziness (Primary)          ED Disposition Condition    Discharge Stable          ED Prescriptions    None       Follow-up Information       Follow up With Specialties Details Why Contact Info    Monroe Ly MD Family Medicine Schedule an appointment as soon as possible for a visit   [address] [address]  [phone]               Bo Mills, DO  09/18/24 2026

## 2024-09-18 NOTE — ED TRIAGE NOTES
"Present with c/o " the nurse called the ambulance because of my blood pressure, it was rally high"     Pt reports she is confused and scared, stating " I don't know what's going on"     Pt is requesting food, pt states " do you have nay food"     Pt reports pain to R hip and leg, rates pain 6/10  "

## 2024-09-19 ENCOUNTER — TELEPHONE (OUTPATIENT)
Dept: EMERGENCY MEDICINE | Facility: HOSPITAL | Age: 59
End: 2024-09-19
Payer: MEDICAID

## 2024-09-19 NOTE — DISCHARGE INSTRUCTIONS
Continue home medications.  Avoid drugs that are not prescribed to you.  Follow up with your primary care provider.

## 2024-09-20 ENCOUNTER — TELEPHONE (OUTPATIENT)
Dept: EMERGENCY MEDICINE | Facility: HOSPITAL | Age: 59
End: 2024-09-20
Payer: MEDICAID

## 2024-09-20 DIAGNOSIS — R76.8 HEPATITIS C ANTIBODY POSITIVE IN BLOOD: Primary | ICD-10-CM

## 2024-09-20 NOTE — TELEPHONE ENCOUNTER
Patient returned my  call for hep C positive test.  Patient denies treatment hep C but she had a hep C antibody positive test in the past.  hep C PCR order placed

## 2024-09-26 ENCOUNTER — TELEPHONE (OUTPATIENT)
Dept: NEUROSURGERY | Facility: CLINIC | Age: 59
End: 2024-09-26

## 2024-09-26 NOTE — TELEPHONE ENCOUNTER
Called patient to cancel appointment today with [facility]. Patient did not answer, will send message to MD's staff for rescheduling

## 2024-10-01 ENCOUNTER — TELEPHONE (OUTPATIENT)
Dept: NEUROSURGERY | Facility: CLINIC | Age: 59
End: 2024-10-01
Payer: MEDICAID

## 2024-10-01 NOTE — TELEPHONE ENCOUNTER
Called pt and rescheduled for 10/24.     Cecilia Oliveira MA  [facility]  Neurosurgery.          ----- Message from Med Assistant Dakotah sent at 9/26/2024  1:41 PM CDT -----  Hey there silvia,    This patient needs to be rescheduled with Dr. Cook. She wasn't able to see her today with her schedule constraints    Thankss

## 2024-10-17 ENCOUNTER — TELEPHONE (OUTPATIENT)
Dept: NEUROSURGERY | Facility: CLINIC | Age: 59
End: 2024-10-17
Payer: MEDICAID

## 2024-10-17 NOTE — TELEPHONE ENCOUNTER
Spoke to patient and informed her that we can change her PM orders to external if she had a place outside of [facility] that she knew of. Patient will call back with the info

## 2024-10-18 ENCOUNTER — TELEPHONE (OUTPATIENT)
Dept: NEUROSURGERY | Facility: HOSPITAL | Age: 59
End: 2024-10-18
Payer: MEDICAID

## 2024-10-18 DIAGNOSIS — M54.16 LUMBAR RADICULOPATHY, CHRONIC: Primary | ICD-10-CM

## 2024-10-18 NOTE — TELEPHONE ENCOUNTER
----- Message from Med Assistant Claire sent at 10/17/2024 12:47 PM CDT -----  Regarding: FW: advise; Pain Managment referral (External)  Contact: Patient @ [phone]  Can you change her orders to external please? I can fax them over to the place she has in mind  ----- Message -----  From: Altagracia Mclain  Sent: 10/17/2024  12:23 PM CDT  To: [facility] Staff  Subject: advise; Pain Managment referral (External)       Good Afternoon. I spoke with patient and she seen JUAN RAMON Gómez in the hospital on 07/25/2024; she believes. JUAN RAMON Gómez put a referral in for Pain Management for dx: Lumbar radiculopathy, chronic [M54.16]. I have reached out to [facility] pain management facilities and no one is able to see patient, due to her insurance not being in Network.     Patient is asking if JUAN RAMON Gómez and referral her out to see a pain management provider. I am only able to assist patient with seeing an [facility] provider, but all of the Medicaid slots are currently full and no one is able to see her. Please call patient to advise. Thanks.

## 2025-03-25 ENCOUNTER — HOSPITAL ENCOUNTER (EMERGENCY)
Facility: HOSPITAL | Age: 60
Discharge: HOME | End: 2025-03-25
Attending: EMERGENCY MEDICINE
Payer: COMMERCIAL

## 2025-03-25 VITALS
DIASTOLIC BLOOD PRESSURE: 91 MMHG | OXYGEN SATURATION: 94 % | WEIGHT: 242 LBS | TEMPERATURE: 98 F | HEIGHT: 67 IN | HEART RATE: 81 BPM | BODY MASS INDEX: 37.98 KG/M2 | SYSTOLIC BLOOD PRESSURE: 186 MMHG | RESPIRATION RATE: 20 BRPM
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
## 2025-03-25 DIAGNOSIS — S20.229A CONTUSION OF BACK, UNSPECIFIED LATERALITY, INITIAL ENCOUNTER: Primary | ICD-10-CM

## 2025-03-25 DIAGNOSIS — M54.30 SCIATICA, UNSPECIFIED LATERALITY: ICD-10-CM

## 2025-03-25 PROCEDURE — 63600175 PHARM REV CODE 636 W HCPCS: Mod: TB,UD,JZ | Performed by: EMERGENCY MEDICINE

## 2025-03-25 PROCEDURE — 99284 EMERGENCY DEPT VISIT MOD MDM: CPT | Mod: 25

## 2025-03-25 PROCEDURE — 96372 THER/PROPH/DIAG INJ SC/IM: CPT | Performed by: EMERGENCY MEDICINE

## 2025-03-25 PROCEDURE — 96374 THER/PROPH/DIAG INJ IV PUSH: CPT

## 2025-03-25 RX ORDER — DICLOFENAC SODIUM 50 MG/1
50 TABLET, DELAYED RELEASE ORAL 3 TIMES DAILY PRN
Qty: 15 TABLET | Refills: 0 | Status: SHIPPED | OUTPATIENT
Start: 2025-03-25

## 2025-03-25 RX ORDER — KETOROLAC TROMETHAMINE 30 MG/ML
15 INJECTION, SOLUTION INTRAMUSCULAR; INTRAVENOUS
Status: COMPLETED | OUTPATIENT
Start: 2025-03-25 | End: 2025-03-25

## 2025-03-25 RX ORDER — METHOCARBAMOL 500 MG/1
1000 TABLET, FILM COATED ORAL 3 TIMES DAILY PRN
Qty: 20 TABLET | Refills: 0 | Status: SHIPPED | OUTPATIENT
Start: 2025-03-25 | End: 2025-03-30

## 2025-03-25 RX ORDER — ORPHENADRINE CITRATE 30 MG/ML
60 INJECTION INTRAMUSCULAR; INTRAVENOUS
Status: COMPLETED | OUTPATIENT
Start: 2025-03-25 | End: 2025-03-25

## 2025-03-25 RX ORDER — TIZANIDINE 4 MG/1
4 TABLET ORAL EVERY 8 HOURS
COMMUNITY

## 2025-03-25 RX ADMIN — ORPHENADRINE CITRATE 60 MG: 60 INJECTION INTRAMUSCULAR; INTRAVENOUS at 12:03

## 2025-03-25 RX ADMIN — KETOROLAC TROMETHAMINE 15 MG: 30 INJECTION, SOLUTION INTRAMUSCULAR; INTRAVENOUS at 12:03

## 2025-03-25 NOTE — ED PROVIDER NOTES
Encounter Date: 3/25/2025       History     Chief Complaint   Patient presents with    Back Pain     States home health was visiting today and she was told to come to the ER for having increased back pain.  Patient fell Friday due to pain and is complaining of Pain radiating from the right side of her back down her leg.  Increased BP noted with EMS     59-year-old female with a past medical history of bipolar disorder, schizophrenia, and hypertension presents for evaluation of back pain.  The patient reports that she accidentally fell going down her steps 4 days ago, landing on her back.  She reports that she landed the mid to lower portion of her back.  She reports that her pain was okay but then got worse 2 days ago.  She reports the pain radiates down into the right hip and right leg.  She reports some associated numbness to the right leg.  She has not taken anything for pain relief.  She denies any associated urinary retention, bowel/bladder incontinence, dysuria, or hematuria.  Her pain is worse with movement and palpation.  There are no alleviating factors.  Additionally she denies any head injury, neck pain, loss of consciousness, chest pain, abdominal pain, or nausea/vomiting.      Review of patient's allergies indicates:   Allergen Reactions    Codeine      Rash and itch    Pcn [penicillins] Swelling     swelling     Past Medical History:   Diagnosis Date    Bipolar 1 disorder     Bipolar 1 disorder     bipolar c schizophrenia and ptsd    Depression     Hypertension     Polysubstance abuse     PTSD (post-traumatic stress disorder)     Schizoaffective disorder     Schizophrenia      Past Surgical History:   Procedure Laterality Date    TUBAL LIGATION       No family history on file.  Social History[1]  Review of Systems   Constitutional:  Negative for chills and fever.   HENT:  Negative for congestion.    Respiratory:  Negative for cough and shortness of breath.    Cardiovascular:  Negative for chest pain.    Gastrointestinal:  Negative for abdominal pain, nausea and vomiting.   Genitourinary:  Negative for dysuria.   Musculoskeletal:  Positive for back pain. Negative for gait problem.   Skin:  Negative for color change.   Neurological:  Negative for dizziness and numbness.   Psychiatric/Behavioral:  Negative for agitation.        Physical Exam     Initial Vitals [03/25/25 1145]   BP Pulse Resp Temp SpO2   (!) 186/91 81 20 98 °F (36.7 °C) (!) 94 %      MAP       --         Physical Exam    Nursing note and vitals reviewed.  Constitutional: She appears well-developed and well-nourished.   HENT:   Head: Atraumatic.   Eyes: EOM are normal. Pupils are equal, round, and reactive to light.   Neck:   Normal range of motion.  Cardiovascular:  Normal rate and regular rhythm.           Pulmonary/Chest: Breath sounds normal.   Abdominal: Abdomen is soft. Bowel sounds are normal. She exhibits no distension. There is no abdominal tenderness. There is no rebound and no guarding.   Musculoskeletal:         General: Tenderness present. Normal range of motion.      Right shoulder: Normal.      Left shoulder: Normal.      Cervical back: Normal range of motion.      Comments: Tenderness to the lower mid thoracic and mid lumbar region.  Tenderness noted to the right lower back.  No midline spinal step-offs noted.  No cervical tenderness or spinal step-offs noted.    Positive straight leg test noted on the right side.     Neurological: She is alert and oriented to person, place, and time.   Skin: Skin is warm and dry.   Psychiatric: She has a normal mood and affect.         ED Course   Procedures  Labs Reviewed   HEPATITIS C ANTIBODY   HIV 1 / 2 ANTIBODY          Imaging Results              X-Ray Lumbar Spine 5 View (Final result)  Result time 03/25/25 13:38:42   Procedure changed from X-Ray Lumbar Spine Ap And Lateral     Final result by Aidan Clay MD (03/25/25 13:38:42)                   Impression:      1.  Postoperative  fusion at L5-S1.    2.  Degenerative disc disease throughout the lumbar spine, similar to the previous exam.      Electronically signed by: Aidan Clay  Date:    03/25/2025  Time:    13:38               Narrative:    CLINICAL HISTORY:  ([id number])60 y/o  (1965) F    fall;    TECHNIQUE:  (A#[id number], exam time 3/25/2025 13:28)    XR LUMBAR SPINE COMPLETE 5 VIEW IMG69    5 view(s) obtained.    COMPARISON:  Most recent radiographs from 07/22/2024.    FINDINGS:  There are 5 non rib bearing lumbar vertebral segments, noting a transitional lumbosacral vertebral body (S1) with a hypoplastic disc at S1-S2..  Bilateral transpedicular screw and lorene fixation is seen at L5-S1, with a metallic intervertebral discectomy spacer fusion device at this level.  There is grade 1 anterolisthesis of L5 on S1 (4-5 mm).  Slight retrolisthesis is seen of L4 on L5 (3 mm), and L2 on L3 (2 mm).  Moderate to severe disc height loss is seen at T12-L1, L1-L2, L2-L3, L3-L4 and L4-L5.  The SI joints and visualized sacrum are normal. There are atheromatous calcifications seen in the aorta.                                       X-Ray Thoracic Spine AP And Lateral (Final result)  Result time 03/25/25 13:36:58      Final result by Aidan Clay MD (03/25/25 13:36:58)                   Impression:      1.  No acute osseous abnormality seen in the thoracic spine.    2.  Degenerative disc height loss and spondylosis most pronounced in the mid-lower thoracic spine.      Electronically signed by: Aidan Clay  Date:    03/25/2025  Time:    13:36               Narrative:    CLINICAL HISTORY:  ([id number])60 y/o  (1965) F    Unspecified fall, initial encounter    TECHNIQUE:  (A#[id number], exam time 3/25/2025 13:28)    XR THORACIC SPINE AP LATERAL IMG61    3 view(s) obtained.    COMPARISON:  None available.    FINDINGS:  There are 12 rib bearing vertebrae and 12 paired ribs.  There is levocurvature at the thoracolumbar junction,  possibly positional.  There is no acute fracture or significant listhesis. Vertebral body heights are maintained.  Moderate disc height loss is seen throughout the thoracic spine with large adjacent endplate osteophytes in the mid-lower thoracic spine.  Visualized ribs, soft tissue and lungs are unremarkable.                                       Medications   ketorolac injection 15 mg (15 mg Intravenous Given 3/25/25 1207)   orphenadrine injection 60 mg (60 mg Intramuscular Given 3/25/25 1209)     Medical Decision Making  59-year-old female presents for back pain.    Initial differential diagnosis included but not limited to fracture, contusion, and sciatica.    Amount and/or Complexity of Data Reviewed  Radiology: ordered.    Risk  Prescription drug management.  Risk Details: The patient was emergently evaluated in the emergency department, her evaluation was significant for an older female with tenderness to her back and a positive straight leg test noted.  The patient's x-rays showed no acute bony abnormalities per Radiology.  The patient was treated here with parental Toradol and parental Norflex, with improvement in her symptoms.  The patient likely has a contusion of her back versus sciatica.  The patient is stable for discharge to home, and does not require further care or workup at this time.  She will be discharged home with p.o. Robaxin and p.o. diclofenac.  She is referred to primary care for follow-up.                                      Clinical Impression:  Final diagnoses:  [W19.XXXA] Fall  [S20.229A] Contusion of back, unspecified laterality, initial encounter (Primary)  [M54.30] Sciatica, unspecified laterality          ED Disposition Condition    Discharge Stable          ED Prescriptions       Medication Sig Dispense Start Date End Date Auth. Provider    diclofenac (VOLTAREN) 50 MG EC tablet Take 1 tablet (50 mg total) by mouth 3 (three) times daily as needed (pain). 15 tablet 3/25/2025 --  Vijay Holland MD    methocarbamoL (ROBAXIN) 500 MG Tab Take 2 tablets (1,000 mg total) by mouth 3 (three) times daily as needed (pain). 20 tablet 3/25/2025 3/30/2025 Vijay Holland MD          Follow-up Information       Follow up With Specialties Details Why Contact Info    Monroe Ly MD Family Medicine Schedule an appointment as soon as possible for a visit   [address] [place] [id number]  [phone]                   [1]   Social History  Tobacco Use    Smoking status: Former    Smokeless tobacco: Former     Quit date: 8/16/2013   Substance Use Topics    Alcohol use: No    Drug use: No        Vijay Holland MD  03/25/25 [id number]

## 2025-04-05 ENCOUNTER — HOSPITAL ENCOUNTER (EMERGENCY)
Facility: HOSPITAL | Age: 60
Discharge: HOME | End: 2025-04-05
Attending: STUDENT IN AN ORGANIZED HEALTH CARE EDUCATION/TRAINING PROGRAM
Payer: MEDICAID

## 2025-04-05 VITALS
OXYGEN SATURATION: 95 % | SYSTOLIC BLOOD PRESSURE: 172 MMHG | RESPIRATION RATE: 20 BRPM | WEIGHT: 240 LBS | BODY MASS INDEX: 37.67 KG/M2 | HEIGHT: 67 IN | DIASTOLIC BLOOD PRESSURE: 72 MMHG | TEMPERATURE: 98 F | HEART RATE: 93 BPM

## 2025-04-05 DIAGNOSIS — R60.9 DEPENDENT EDEMA: ICD-10-CM

## 2025-04-05 DIAGNOSIS — R06.02 SHORTNESS OF BREATH: Primary | ICD-10-CM

## 2025-04-05 DIAGNOSIS — F20.89 OTHER SCHIZOPHRENIA: ICD-10-CM

## 2025-04-05 LAB
ABSOLUTE EOSINOPHIL (OHS): 0.06 K/UL
ABSOLUTE MONOCYTE (OHS): 0.44 K/UL (ref 0.3–1)
ABSOLUTE NEUTROPHIL COUNT (OHS): 4.35 K/UL (ref 1.8–7.7)
ALBUMIN SERPL BCP-MCNC: 3.7 G/DL (ref 3.5–5.2)
ALP SERPL-CCNC: 170 UNIT/L (ref 40–150)
ALT SERPL W/O P-5'-P-CCNC: 63 UNIT/L (ref 10–44)
AMPHET UR QL SCN: NEGATIVE
ANION GAP (OHS): 9 MMOL/L (ref 8–16)
AST SERPL-CCNC: 68 UNIT/L (ref 11–45)
BARBITURATE SCN PRESENT UR: NEGATIVE
BASOPHILS # BLD AUTO: 0.04 K/UL
BASOPHILS NFR BLD AUTO: 0.7 %
BENZODIAZ UR QL SCN: NEGATIVE
BILIRUB SERPL-MCNC: 0.5 MG/DL (ref 0.1–1)
BILIRUB UR QL STRIP.AUTO: NEGATIVE
BNP SERPL-MCNC: 86 PG/ML (ref 0–99)
BUN SERPL-MCNC: 14 MG/DL (ref 6–20)
CALCIUM SERPL-MCNC: 9 MG/DL (ref 8.7–10.5)
CANNABINOIDS UR QL SCN: NEGATIVE
CHLORIDE SERPL-SCNC: 108 MMOL/L (ref 95–110)
CLARITY UR: CLEAR
CO2 SERPL-SCNC: 23 MMOL/L (ref 23–29)
COCAINE UR QL SCN: NEGATIVE
COLOR UR AUTO: YELLOW
CREAT SERPL-MCNC: 0.7 MG/DL (ref 0.5–1.4)
CREAT UR-MCNC: 47.8 MG/DL (ref 15–325)
ERYTHROCYTE [DISTWIDTH] IN BLOOD BY AUTOMATED COUNT: 14 % (ref 11.5–14.5)
GFR SERPLBLD CREATININE-BSD FMLA CKD-EPI: >60 ML/MIN/1.73/M2
GLUCOSE SERPL-MCNC: 143 MG/DL (ref 70–110)
GLUCOSE UR QL STRIP: NEGATIVE
HCT VFR BLD AUTO: 45.4 % (ref 37–48.5)
HGB BLD-MCNC: 14.9 GM/DL (ref 12–16)
HGB UR QL STRIP: NEGATIVE
IMM GRANULOCYTES # BLD AUTO: 0.08 K/UL (ref 0–0.04)
IMM GRANULOCYTES NFR BLD AUTO: 1.3 % (ref 0–0.5)
INFLUENZA A MOLECULAR (OHS): NEGATIVE
INFLUENZA B MOLECULAR (OHS): NEGATIVE
KETONES UR QL STRIP: NEGATIVE
LEUKOCYTE ESTERASE UR QL STRIP: NEGATIVE
LYMPHOCYTES # BLD AUTO: 1.13 K/UL (ref 1–4.8)
MAGNESIUM SERPL-MCNC: 2.1 MG/DL (ref 1.6–2.6)
MCH RBC QN AUTO: 30.3 PG (ref 27–31)
MCHC RBC AUTO-ENTMCNC: 32.8 G/DL (ref 32–36)
MCV RBC AUTO: 92 FL (ref 82–98)
METHADONE UR QL SCN: NEGATIVE
NITRITE UR QL STRIP: NEGATIVE
NUCLEATED RBC (/100WBC) (OHS): 0 /100 WBC
OPIATES UR QL SCN: NEGATIVE
PCP UR QL: NEGATIVE
PH UR STRIP: 7 [PH]
PLATELET # BLD AUTO: 143 K/UL (ref 150–450)
PMV BLD AUTO: 10.3 FL (ref 9.2–12.9)
POTASSIUM SERPL-SCNC: 3.8 MMOL/L (ref 3.5–5.1)
PROT SERPL-MCNC: 7.9 GM/DL (ref 6–8.4)
PROT UR QL STRIP: NEGATIVE
RBC # BLD AUTO: 4.92 M/UL (ref 4–5.4)
RELATIVE EOSINOPHIL (OHS): 1 %
RELATIVE LYMPHOCYTE (OHS): 18.5 % (ref 18–48)
RELATIVE MONOCYTE (OHS): 7.2 % (ref 4–15)
RELATIVE NEUTROPHIL (OHS): 71.3 % (ref 38–73)
SARS-COV-2 RDRP RESP QL NAA+PROBE: NEGATIVE
SODIUM SERPL-SCNC: 140 MMOL/L (ref 136–145)
SP GR UR STRIP: 1.02
TROPONIN I SERPL DL<=0.01 NG/ML-MCNC: 0.02 NG/ML
UROBILINOGEN UR STRIP-ACNC: NEGATIVE EU/DL
WBC # BLD AUTO: 6.1 K/UL (ref 3.9–12.7)

## 2025-04-05 PROCEDURE — 83880 ASSAY OF NATRIURETIC PEPTIDE: CPT | Performed by: STUDENT IN AN ORGANIZED HEALTH CARE EDUCATION/TRAINING PROGRAM

## 2025-04-05 PROCEDURE — U0002 COVID-19 LAB TEST NON-CDC: HCPCS | Performed by: STUDENT IN AN ORGANIZED HEALTH CARE EDUCATION/TRAINING PROGRAM

## 2025-04-05 PROCEDURE — 80307 DRUG TEST PRSMV CHEM ANLYZR: CPT | Performed by: STUDENT IN AN ORGANIZED HEALTH CARE EDUCATION/TRAINING PROGRAM

## 2025-04-05 PROCEDURE — 83735 ASSAY OF MAGNESIUM: CPT | Performed by: STUDENT IN AN ORGANIZED HEALTH CARE EDUCATION/TRAINING PROGRAM

## 2025-04-05 PROCEDURE — 87502 INFLUENZA DNA AMP PROBE: CPT | Performed by: STUDENT IN AN ORGANIZED HEALTH CARE EDUCATION/TRAINING PROGRAM

## 2025-04-05 PROCEDURE — 94761 N-INVAS EAR/PLS OXIMETRY MLT: CPT

## 2025-04-05 PROCEDURE — 71045 X-RAY EXAM CHEST 1 VIEW: CPT | Mod: TC

## 2025-04-05 PROCEDURE — 25000242 PHARM REV CODE 250 ALT 637 W/ HCPCS: Performed by: STUDENT IN AN ORGANIZED HEALTH CARE EDUCATION/TRAINING PROGRAM

## 2025-04-05 PROCEDURE — 93010 ELECTROCARDIOGRAM REPORT: CPT | Mod: ,,, | Performed by: INTERNAL MEDICINE

## 2025-04-05 PROCEDURE — 80053 COMPREHEN METABOLIC PANEL: CPT | Performed by: STUDENT IN AN ORGANIZED HEALTH CARE EDUCATION/TRAINING PROGRAM

## 2025-04-05 PROCEDURE — 99900035 HC TECH TIME PER 15 MIN (STAT)

## 2025-04-05 PROCEDURE — 84484 ASSAY OF TROPONIN QUANT: CPT | Performed by: STUDENT IN AN ORGANIZED HEALTH CARE EDUCATION/TRAINING PROGRAM

## 2025-04-05 PROCEDURE — 85025 COMPLETE CBC W/AUTO DIFF WBC: CPT | Performed by: STUDENT IN AN ORGANIZED HEALTH CARE EDUCATION/TRAINING PROGRAM

## 2025-04-05 PROCEDURE — 96375 TX/PRO/DX INJ NEW DRUG ADDON: CPT

## 2025-04-05 PROCEDURE — 63600175 PHARM REV CODE 636 W HCPCS: Mod: UD | Performed by: STUDENT IN AN ORGANIZED HEALTH CARE EDUCATION/TRAINING PROGRAM

## 2025-04-05 PROCEDURE — 71045 X-RAY EXAM CHEST 1 VIEW: CPT | Mod: 26,,, | Performed by: STUDENT IN AN ORGANIZED HEALTH CARE EDUCATION/TRAINING PROGRAM

## 2025-04-05 PROCEDURE — 99285 EMERGENCY DEPT VISIT HI MDM: CPT | Mod: 25

## 2025-04-05 PROCEDURE — 99406 BEHAV CHNG SMOKING 3-10 MIN: CPT

## 2025-04-05 PROCEDURE — 81003 URINALYSIS AUTO W/O SCOPE: CPT | Performed by: STUDENT IN AN ORGANIZED HEALTH CARE EDUCATION/TRAINING PROGRAM

## 2025-04-05 PROCEDURE — 93005 ELECTROCARDIOGRAM TRACING: CPT | Performed by: INTERNAL MEDICINE

## 2025-04-05 PROCEDURE — 94640 AIRWAY INHALATION TREATMENT: CPT

## 2025-04-05 PROCEDURE — 25000003 PHARM REV CODE 250: Performed by: STUDENT IN AN ORGANIZED HEALTH CARE EDUCATION/TRAINING PROGRAM

## 2025-04-05 PROCEDURE — 96374 THER/PROPH/DIAG INJ IV PUSH: CPT

## 2025-04-05 RX ORDER — ALBUTEROL SULFATE 90 UG/1
1-2 INHALANT RESPIRATORY (INHALATION) EVERY 6 HOURS PRN
Qty: 8 G | Refills: 3 | Status: SHIPPED | OUTPATIENT
Start: 2025-04-05 | End: 2025-04-05

## 2025-04-05 RX ORDER — IPRATROPIUM BROMIDE AND ALBUTEROL SULFATE 2.5; .5 MG/3ML; MG/3ML
3 SOLUTION RESPIRATORY (INHALATION)
Status: COMPLETED | OUTPATIENT
Start: 2025-04-05 | End: 2025-04-05

## 2025-04-05 RX ORDER — PREDNISONE 20 MG/1
40 TABLET ORAL DAILY
Qty: 10 TABLET | Refills: 0 | Status: SHIPPED | OUTPATIENT
Start: 2025-04-05 | End: 2025-04-10

## 2025-04-05 RX ORDER — POTASSIUM CHLORIDE 750 MG/1
10 TABLET, EXTENDED RELEASE ORAL DAILY
Qty: 15 TABLET | Refills: 0 | Status: SHIPPED | OUTPATIENT
Start: 2025-04-05

## 2025-04-05 RX ORDER — FUROSEMIDE 20 MG/1
20 TABLET ORAL DAILY PRN
Qty: 15 TABLET | Refills: 0 | Status: SHIPPED | OUTPATIENT
Start: 2025-04-05

## 2025-04-05 RX ORDER — ALBUTEROL SULFATE 2.5 MG/.5ML
2.5 SOLUTION RESPIRATORY (INHALATION) EVERY 4 HOURS PRN
Qty: 20 EACH | Refills: 1 | Status: SHIPPED | OUTPATIENT
Start: 2025-04-05 | End: 2026-04-05

## 2025-04-05 RX ORDER — ALBUTEROL SULFATE 90 UG/1
1-2 INHALANT RESPIRATORY (INHALATION) EVERY 6 HOURS PRN
Qty: 8 G | Refills: 3 | Status: SHIPPED | OUTPATIENT
Start: 2025-04-05 | End: 2026-04-05

## 2025-04-05 RX ORDER — FUROSEMIDE 10 MG/ML
40 INJECTION INTRAMUSCULAR; INTRAVENOUS
Status: COMPLETED | OUTPATIENT
Start: 2025-04-05 | End: 2025-04-05

## 2025-04-05 RX ORDER — METHYLPREDNISOLONE SOD SUCC 125 MG
125 VIAL (EA) INJECTION
Status: COMPLETED | OUTPATIENT
Start: 2025-04-05 | End: 2025-04-05

## 2025-04-05 RX ORDER — POTASSIUM CHLORIDE 750 MG/1
10 TABLET, EXTENDED RELEASE ORAL
Status: COMPLETED | OUTPATIENT
Start: 2025-04-05 | End: 2025-04-05

## 2025-04-05 RX ADMIN — IPRATROPIUM BROMIDE AND ALBUTEROL SULFATE 3 ML: .5; 3 SOLUTION RESPIRATORY (INHALATION) at 11:04

## 2025-04-05 RX ADMIN — METHYLPREDNISOLONE SODIUM SUCCINATE 125 MG: 125 INJECTION, POWDER, FOR SOLUTION INTRAMUSCULAR; INTRAVENOUS at 12:04

## 2025-04-05 RX ADMIN — POTASSIUM CHLORIDE 10 MEQ: 750 TABLET, EXTENDED RELEASE ORAL at 12:04

## 2025-04-05 RX ADMIN — FUROSEMIDE 40 MG: 10 INJECTION, SOLUTION INTRAVENOUS at 12:04

## 2025-04-05 NOTE — ED PROVIDER NOTES
Encounter Date: 4/5/2025       History     Chief Complaint   Patient presents with    Leg Swelling     Reports that she has had bilateral feet swelling, and hand swelling- states that she has a hx new COPD- reports that she has had increased SOB - denies any CP today     59-year-old female with a history of schizophrenia, schizophrenia is regarding a, polysubstance abuse, depression, hypertension, bipolar, 30 pack cigarettes smoking history.  She presents to ED with complaints of 4 day history of edema to lower extremities, and hands. She reports associated exertional dyspnea, in endorses orthopnea. Denies chest pain, palpitations, fever, chills, productive cough.      The history is provided by the patient. No  was used.     Review of patient's allergies indicates:   Allergen Reactions    Codeine      Rash and itch    Pcn [penicillins] Swelling     swelling     Past Medical History:   Diagnosis Date    Bipolar 1 disorder     Bipolar 1 disorder     bipolar c schizophrenia and ptsd    Depression     Hypertension     Polysubstance abuse     PTSD (post-traumatic stress disorder)     Schizoaffective disorder     Schizophrenia      Past Surgical History:   Procedure Laterality Date    TUBAL LIGATION       No family history on file.  Social History[1]  Review of Systems   Constitutional: Negative.    HENT: Negative.     Eyes: Negative.    Respiratory:  Positive for shortness of breath and wheezing.    Cardiovascular:  Positive for leg swelling.   Gastrointestinal: Negative.    Endocrine: Negative.    Genitourinary: Negative.    Musculoskeletal: Negative.    Skin: Negative.    Neurological: Negative.    Hematological: Negative.    Psychiatric/Behavioral: Negative.     All other systems reviewed and are negative.      Physical Exam     Initial Vitals   BP Pulse Resp Temp SpO2   04/05/25 1020 04/05/25 1020 04/05/25 1020 04/05/25 1021 04/05/25 1020   (!) 209/93 92 (!) 22 98.1 °F (36.7 °C) 95 %      MAP        --                Physical Exam    Nursing note and vitals reviewed.  Constitutional: She appears well-developed.   Morbidly obese   HENT:   Head: Normocephalic.   Eyes: Pupils are equal, round, and reactive to light.   Neck: No JVD present.   Normal range of motion.  Cardiovascular:  Normal rate.           Pulmonary/Chest: No respiratory distress. She has wheezes.   Abdominal: Abdomen is soft. Bowel sounds are normal. She exhibits no distension. There is no abdominal tenderness.   Obese rounded abdomen   Musculoskeletal:      Cervical back: Normal range of motion.     Neurological: She is oriented to person, place, and time. She has normal strength. GCS score is 15. GCS eye subscore is 4. GCS verbal subscore is 5. GCS motor subscore is 6.   Skin: Skin is warm. Capillary refill takes less than 2 seconds.   1 to 2+ pitting edema up to mid leg   Psychiatric: She has a normal mood and affect.         ED Course   Procedures  Labs Reviewed   INFLUENZA A & B BY MOLECULAR   CBC W/ AUTO DIFFERENTIAL    Narrative:     The following orders were created for panel order CBC Auto Differential.  Procedure                               Abnormality         Status                     ---------                               -----------         ------                     CBC with Differential[[id number]]                                                        Please view results for these tests on the individual orders.   COMPREHENSIVE METABOLIC PANEL   SARS-COV-2 RNA AMPLIFICATION, QUAL   TROPONIN I   B-TYPE NATRIURETIC PEPTIDE   MAGNESIUM   CBC WITH DIFFERENTIAL     EKG Readings: (Independently Interpreted)   sinus rhythm, no significant ST or T-wave abnormalities, no STEMI       Imaging Results    None          Medications - No data to display  Medical Decision Making  59-year-old female with shortness of breath.   She is wheezing on exam, pitting dependent edema.  COPD versus CHF.  EKG shows sinus rhythm, no significant ST or  T-wave abnormalities, no STEMI  Chest x-ray is unremarkable  Rest of screening labs showed no significant gross abnormalities  She received Lasix, breathing treatments, steroids in the ED  Her blood pressure improved upon re-evaluation, breathing is improved.    Patient was seen and reevaluated. Patient's symptoms seem to be stable. I discussed the patient's diagnosis, treatment plan, and plan for discharge with the patient. Patient was instructed to follow up with PCP and was given strict return precautions to the ED. The patient voiced understanding and agreed with the plan        Amount and/or Complexity of Data Reviewed  Labs: ordered. Decision-making details documented in ED Course.  Radiology: ordered. Decision-making details documented in ED Course.    Risk  Prescription drug management.               ED Course as of 04/05/25 1303   Sat Apr 05, 2025   1302 BNP: 86 [ES]   1302 Troponin I: 0.018 [ES]   1302 Magnesium : 2.1 [ES]   1302 Magnesium [ES]   1302 BUN: 14 [ES]   1302 Creatinine: 0.7 [ES]   1302 WBC: 6.10 [ES]   1302 Hemoglobin: 14.9 [ES]   1302 Hematocrit: 45.4 [ES]   1302 X-Ray Chest 1 View [ES]      ED Course User Index  [ES] Royce Butt MD                           Clinical Impression:  Final diagnoses:  [R06.02] Shortness of breath                     [1]   Social History  Tobacco Use    Smoking status: Former    Smokeless tobacco: Former     Quit date: 8/16/2013   Substance Use Topics    Alcohol use: No    Drug use: No        Royce Butt MD  04/05/25 1305

## 2025-04-05 NOTE — ED NOTES
Patient received for discharge.  Patient is awake, alert, and oriented x 4.  Speech clear and appropriate.  RR regular and non labored.  Skin W/D/P.  Given written and verbal discharge instruction, with verbal understanding.  Patient given the opportunity to ask questions, with answers to meet needs.  No complaints or noted concerns.  No pain.  Ambulatory in ED with steady gait.  Assisted to transport via WC.

## 2025-04-05 NOTE — RESPIRATORY THERAPY
04/05/25 1107   Patient Assessment/Suction   Level of Consciousness (AVPU) alert   Respiratory Effort Normal;Unlabored   Expansion/Accessory Muscles/Retractions no retractions;no use of accessory muscles   All Lung Fields Breath Sounds Anterior:;Posterior:;Lateral:;equal bilaterally;wheezes, expiratory   Rhythm/Pattern, Respiratory depth regular;pattern regular;shortness of breath   Cough Frequency frequent   Cough Type good;nonproductive;congested   PRE-TX-O2   Device (Oxygen Therapy) room air   SpO2 (!) 93 %   Pulse Oximetry Type Continuous   $ Pulse Oximetry - Multiple Charge Pulse Oximetry - Multiple   Pulse 84   Resp 20   Aerosol Therapy   $ Aerosol Therapy Charges Aerosol Treatment   Daily Review of Necessity (SVN) completed   Respiratory Treatment Status (SVN) given   Treatment Route (SVN) oxygen;mouthpiece utilized   Patient Position HOB elevated   Post Treatment Assessment (SVN) breath sounds improved   Signs of Intolerance (SVN) none   Breath Sounds Post-Respiratory Treatment   Throughout All Fields Post-Treatment All Fields   Throughout All Fields Post-Treatment aeration increased   Post-treatment Heart Rate (beats/min) 82   Post-treatment Resp Rate (breaths/min) 20   Equipment Change   $ RT Equipment Treatment nebulizer   COPD Questionnaire   How often do you cough? 5   How often do you have phlegm (mucus) in your chest? 4   How often does your chest feel tight? 3   When you walk up a hill or one flight of stairs, how often are you breathless? 5   How often are you limited doing any activities at home? 4   How often are you confident leaving the house despite your lung condition? 1  (has to have someone with her when she leaves the house)   How often do you sleep soundly? 4   How often do you have energy? 4   Total score 30   Tobacco Cessation Intervention   Do you use any type of tobacco product? Yes   Are you interested in quitting use of tobacco products? Ready to quit   Are you interested in  Nicotine Replacement for symptom relief? Yes  (anything to help quit)   $ Smoking Cessation Charges Smoking Cessation - Intermediate (Non-CTTS)   Respiratory Evaluation   $ Care Plan Tech Time 15 min   Evaluation For New Orders   Admitting Diagnosis leg swelling SOB   Cardiac Diagnosis HTN   Pulmonary Diagnosis Claims newly diagnosed COPD, waiting further testing   Home Oxygen   Has Home Oxygen? No   Home Aerosol, MDI, DPI, and Other Treatments/Therapies   Home Respiratory Therapy Per Patient/Review of Chart No   Oxygen Care Plan   Oxygen Care Plan Per Protocol   Rationale Suspected hyoxemia

## 2025-04-05 NOTE — ED NOTES
Initial contact and introduction made with patient. Pt presents to the ED via POV with c/o leg swelling and hand swelling, bilaterally. Pt reports she was dx with COPD recently and feels SOB when ambulatory. Denies chest pains. Pt reports smoking 0.5 packs a day recently, a pack hx of 21 years. SpO2 97% on RA.      Pain:  Rated 8/10 (right lower back pain that radiates to hip and down to leg.)     Psychosocial:  Patient is calm and cooperative.  Patients insight and judgement are appropriate to situation.  Appears clean, well maintained, with clothing appropriate to environment.  No evidence of delusions, hallucinations, or psychosis.     Neuro:  Eyes open spontaneously.  Awake, alert, oriented x 4.  Speech clear and appropriate.  Tolerating saliva secretions well.  Able to follow commands, demonstrating ability to actively and appropriately communicate within context of current conversation.  Symmetrical facial muscles.  Moving all extremities well with no noted weakness.  Adequate muscle tone present.   Movement is purposeful.      Airway:  Bilateral chest rise and fall.  RR uneven and labored.  Mild audible wheezing.     Circulatory:  Skin warm, dry, and pink.     Extremities:  Swelling bilaterally in lower extremities.      Skin:  Intact, abrasion on right forearm.

## 2025-04-07 LAB
OHS QRS DURATION: 92 MS
OHS QTC CALCULATION: 440 MS

## 2025-06-16 ENCOUNTER — PROCEDURE VISIT (OUTPATIENT)
Dept: RESPIRATORY THERAPY | Facility: HOSPITAL | Age: 60
End: 2025-06-16
Attending: FAMILY MEDICINE
Payer: MEDICAID

## 2025-06-16 DIAGNOSIS — J44.9 COPD (CHRONIC OBSTRUCTIVE PULMONARY DISEASE): ICD-10-CM

## 2025-06-16 DIAGNOSIS — F17.200 SMOKER: ICD-10-CM

## 2025-06-16 LAB
BRPFT: NORMAL
DLCO ADJ PRE: 22.58 ML/(MIN*MMHG)
DLCO SINGLE BREATH LLN: 18.34
DLCO SINGLE BREATH PRE REF: 93.8 %
DLCO SINGLE BREATH REF: 24.07
DLCOC SBVA LLN: 3.17
DLCOC SBVA PRE REF: 91.6 %
DLCOC SBVA REF: 4.54
DLCOC SINGLE BREATH LLN: 18.34
DLCOC SINGLE BREATH PRE REF: 93.8 %
DLCOC SINGLE BREATH REF: 24.07
DLCOVA LLN: 3.17
DLCOVA PRE REF: 91.6 %
DLCOVA PRE: 4.16 ML/(MIN*MMHG*L)
DLCOVA REF: 4.54
DLVAADJ PRE: 4.16 ML/(MIN*MMHG*L)
ERVN2 LLN: -16449.18
ERVN2 PRE REF: 19.7 %
ERVN2 PRE: 0.16 L
ERVN2 REF: 0.82
FEF 25 75 CHG: 28.2 %
FEF 25 75 LLN: 1.58
FEF 25 75 POST REF: 65.5 %
FEF 25 75 PRE REF: 51.1 %
FEF 25 75 REF: 2.98
FET100 CHG: -33.6 %
FEV1 CHG: 0.7 %
FEV1 FVC CHG: 8.8 %
FEV1 FVC LLN: 67
FEV1 FVC POST REF: 96.3 %
FEV1 FVC PRE REF: 88.5 %
FEV1 FVC REF: 79
FEV1 LLN: 2.01
FEV1 POST REF: 87.6 %
FEV1 PRE REF: 87 %
FEV1 REF: 2.66
FRCN2 LLN: 2
FRCN2 PRE REF: 90.1 %
FRCN2 REF: 2.82
FVC CHG: -7.5 %
FVC LLN: 2.57
FVC POST REF: 90.3 %
FVC PRE REF: 97.6 %
FVC REF: 3.4
IVC PRE: 3.33 L
IVC SINGLE BREATH LLN: 2.57
IVC SINGLE BREATH PRE REF: 98 %
IVC SINGLE BREATH REF: 3.4
MVV LLN: 86
MVV PRE REF: 57.2 %
MVV REF: 101
PEF CHG: 5.5 %
PEF LLN: 4.8
PEF POST REF: 77.7 %
PEF PRE REF: 73.7 %
PEF REF: 6.63
POST FEF 25 75: 1.95 L/S
POST FET 100: 8.37 SEC
POST FEV1 FVC: 76.1 %
POST FEV1: 2.33 L
POST FVC: 3.07 L
POST PEF: 5.15 L/S
PRE DLCO: 22.58 ML/(MIN*MMHG)
PRE FEF 25 75: 1.52 L/S
PRE FET 100: 12.61 SEC
PRE FEV1 FVC: 69.92 %
PRE FEV1: 2.32 L
PRE FRC N2: 2.54 L
PRE FVC: 3.31 L
PRE MVV: 58 L/MIN
PRE PEF: 4.88 L/S
RVN2 LLN: 1.43
RVN2 PRE REF: 103.6 %
RVN2 PRE: 2.07 L
RVN2 REF: 2
RVN2TLCN2 LLN: 29.77
RVN2TLCN2 PRE REF: 97.7 %
RVN2TLCN2 PRE: 38.46 %
RVN2TLCN2 REF: 39.36
TLCN2 LLN: 4.31
TLCN2 PRE REF: 101.7 %
TLCN2 PRE: 5.39 L
TLCN2 REF: 5.3
VA PRE: 5.43 L
VA SINGLE BREATH LLN: 5.15
VA SINGLE BREATH PRE REF: 105.5 %
VA SINGLE BREATH REF: 5.15
VCMAXN2 LLN: 2.57
VCMAXN2 PRE REF: 97.6 %
VCMAXN2 PRE: 3.31 L
VCMAXN2 REF: 3.4

## 2025-06-16 PROCEDURE — 94060 EVALUATION OF WHEEZING: CPT

## 2025-06-16 PROCEDURE — 94060 EVALUATION OF WHEEZING: CPT | Mod: 26,,, | Performed by: INTERNAL MEDICINE

## 2025-06-16 PROCEDURE — 25000242 PHARM REV CODE 250 ALT 637 W/ HCPCS

## 2025-06-16 PROCEDURE — 94729 DIFFUSING CAPACITY: CPT

## 2025-06-16 PROCEDURE — 94727 GAS DIL/WSHOT DETER LNG VOL: CPT | Mod: 26,,, | Performed by: INTERNAL MEDICINE

## 2025-06-16 PROCEDURE — 94727 GAS DIL/WSHOT DETER LNG VOL: CPT

## 2025-06-16 PROCEDURE — 94729 DIFFUSING CAPACITY: CPT | Mod: 26,,, | Performed by: INTERNAL MEDICINE

## 2025-06-16 RX ORDER — ALBUTEROL SULFATE 0.83 MG/ML
SOLUTION RESPIRATORY (INHALATION)
Status: COMPLETED
Start: 2025-06-16 | End: 2025-06-16

## 2025-06-16 RX ORDER — ALBUTEROL SULFATE 0.83 MG/ML
2.5 SOLUTION RESPIRATORY (INHALATION) ONCE
Status: ACTIVE | OUTPATIENT
Start: 2025-06-16

## 2025-06-16 RX ADMIN — ALBUTEROL SULFATE 2.5 MG: 2.5 SOLUTION RESPIRATORY (INHALATION) at 11:06
